# Patient Record
Sex: FEMALE | Race: WHITE | NOT HISPANIC OR LATINO | Employment: OTHER | URBAN - METROPOLITAN AREA
[De-identification: names, ages, dates, MRNs, and addresses within clinical notes are randomized per-mention and may not be internally consistent; named-entity substitution may affect disease eponyms.]

---

## 2019-11-15 ENCOUNTER — HOSPITAL ENCOUNTER (OUTPATIENT)
Dept: ULTRASOUND IMAGING | Facility: HOSPITAL | Age: 59
Discharge: HOME | End: 2019-11-15
Attending: GENERAL PRACTICE

## 2020-05-05 ENCOUNTER — HOSPITAL ENCOUNTER (OUTPATIENT)
Dept: GENERAL RADIOLOGY | Facility: HOSPITAL | Age: 60
Discharge: HOME | End: 2020-05-05
Attending: GENERAL PRACTICE

## 2020-05-05 LAB
25(OH)D3 SERPL-MCNC: 8.3 NG/ML (ref 30–100)
ALBUMIN SERPL-MCNC: 3.8 G/DL (ref 3.5–5)
ALBUMIN/GLOB SERPL: 1.2 {RATIO} (ref 1.4–2.6)
ALP SERPL-CCNC: 80 U/L (ref 53–141)
ALT SERPL-CCNC: 9 U/L (ref 10–40)
ANION GAP SERPL CALC-SCNC: 21 MMOL/L (ref 8–19)
AST SERPL-CCNC: 13 U/L (ref 15–50)
BASOPHILS # BLD AUTO: 0.11 10*3/UL (ref 0–0.2)
BASOPHILS NFR BLD AUTO: 0.8 % (ref 0–3)
BILIRUB SERPL-MCNC: 0.33 MG/DL (ref 0.2–1.3)
BUN SERPL-MCNC: 8 MG/DL (ref 5–25)
BUN/CREAT SERPL: 10 {RATIO} (ref 6–20)
CALCIUM SERPL-MCNC: 9.2 MG/DL (ref 8.7–10.4)
CHLORIDE SERPL-SCNC: 95 MMOL/L (ref 99–111)
CHOLEST SERPL-MCNC: 244 MG/DL (ref 107–200)
CHOLEST/HDLC SERPL: 8.4 {RATIO} (ref 3–6)
CK SERPL-CCNC: 66 U/L (ref 35–230)
CONV ABS IMM GRAN: 0.13 10*3/UL (ref 0–0.2)
CONV CO2: 25 MMOL/L (ref 22–32)
CONV IMMATURE GRAN: 0.9 % (ref 0–1.8)
CONV TOTAL PROTEIN: 7 G/DL (ref 6.3–8.2)
CREAT UR-MCNC: 0.78 MG/DL (ref 0.5–0.9)
DEPRECATED RDW RBC AUTO: 47.7 FL (ref 36.4–46.3)
EOSINOPHIL # BLD AUTO: 0.47 10*3/UL (ref 0–0.7)
EOSINOPHIL # BLD AUTO: 3.4 % (ref 0–7)
ERYTHROCYTE [DISTWIDTH] IN BLOOD BY AUTOMATED COUNT: 14.9 % (ref 11.7–14.4)
EST. AVERAGE GLUCOSE BLD GHB EST-MCNC: 263 MG/DL
GFR SERPLBLD BASED ON 1.73 SQ M-ARVRAT: >60 ML/MIN/{1.73_M2}
GLOBULIN UR ELPH-MCNC: 3.2 G/DL (ref 2–3.5)
GLUCOSE SERPL-MCNC: 302 MG/DL (ref 65–99)
HBA1C MFR BLD: 10.8 % (ref 3.5–5.7)
HCT VFR BLD AUTO: 45.9 % (ref 37–47)
HDLC SERPL-MCNC: 29 MG/DL (ref 40–60)
HGB BLD-MCNC: 13.9 G/DL (ref 12–16)
LDLC SERPL CALC-MCNC: 165 MG/DL (ref 70–100)
LYMPHOCYTES # BLD AUTO: 1.75 10*3/UL (ref 1–5)
LYMPHOCYTES NFR BLD AUTO: 12.5 % (ref 20–45)
MCH RBC QN AUTO: 26.5 PG (ref 27–31)
MCHC RBC AUTO-ENTMCNC: 30.3 G/DL (ref 33–37)
MCV RBC AUTO: 87.4 FL (ref 81–99)
MONOCYTES # BLD AUTO: 0.54 10*3/UL (ref 0.2–1.2)
MONOCYTES NFR BLD AUTO: 3.9 % (ref 3–10)
NEUTROPHILS # BLD AUTO: 10.97 10*3/UL (ref 2–8)
NEUTROPHILS NFR BLD AUTO: 78.5 % (ref 30–85)
NRBC CBCN: 0 % (ref 0–0.7)
OSMOLALITY SERPL CALC.SUM OF ELEC: 292 MOSM/KG (ref 273–304)
PLATELET # BLD AUTO: 358 10*3/UL (ref 130–400)
PMV BLD AUTO: 10.9 FL (ref 9.4–12.3)
POTASSIUM SERPL-SCNC: 5.1 MMOL/L (ref 3.5–5.3)
RBC # BLD AUTO: 5.25 10*6/UL (ref 4.2–5.4)
SODIUM SERPL-SCNC: 136 MMOL/L (ref 135–147)
T4 FREE SERPL-MCNC: 0.5 NG/DL (ref 0.9–1.8)
TRIGL SERPL-MCNC: 250 MG/DL (ref 40–150)
TSH SERPL-ACNC: 21.69 M[IU]/L (ref 0.27–4.2)
VIT B12 SERPL-MCNC: 395 PG/ML (ref 211–911)
VLDLC SERPL-MCNC: 50 MG/DL (ref 5–37)
WBC # BLD AUTO: 13.97 10*3/UL (ref 4.8–10.8)

## 2020-05-06 LAB
CONV CREATININE URINE, RANDOM: 138.5 MG/DL (ref 10–300)
CONV MICROALBUM.,U,RANDOM: 15.3 MG/L (ref 0–20)
MICROALBUMIN/CREAT UR: 11 MG/G{CRE} (ref 0–35)

## 2021-05-03 ENCOUNTER — OFFICE VISIT CONVERTED (OUTPATIENT)
Dept: FAMILY MEDICINE CLINIC | Facility: CLINIC | Age: 61
End: 2021-05-03
Attending: NURSE PRACTITIONER

## 2021-05-03 ENCOUNTER — CONVERSION ENCOUNTER (OUTPATIENT)
Dept: FAMILY MEDICINE CLINIC | Facility: CLINIC | Age: 61
End: 2021-05-03

## 2021-05-03 ENCOUNTER — HOSPITAL ENCOUNTER (OUTPATIENT)
Dept: GENERAL RADIOLOGY | Facility: HOSPITAL | Age: 61
Discharge: HOME | End: 2021-05-03
Attending: NURSE PRACTITIONER

## 2021-05-03 LAB
ALBUMIN SERPL-MCNC: 4.2 G/DL (ref 3.5–5)
ALBUMIN/GLOB SERPL: 1.3 {RATIO} (ref 1.4–2.6)
ALP SERPL-CCNC: 73 U/L (ref 53–141)
ALT SERPL-CCNC: 11 U/L (ref 10–40)
ANION GAP SERPL CALC-SCNC: 20 MMOL/L (ref 8–19)
AST SERPL-CCNC: 11 U/L (ref 15–50)
BASOPHILS # BLD AUTO: 0.09 10*3/UL (ref 0–0.2)
BASOPHILS NFR BLD AUTO: 0.7 % (ref 0–3)
BILIRUB SERPL-MCNC: 0.43 MG/DL (ref 0.2–1.3)
BUN SERPL-MCNC: 12 MG/DL (ref 5–25)
BUN/CREAT SERPL: 13 {RATIO} (ref 6–20)
CALCIUM SERPL-MCNC: 8.6 MG/DL (ref 8.7–10.4)
CHLORIDE SERPL-SCNC: 96 MMOL/L (ref 99–111)
CHOLEST SERPL-MCNC: 253 MG/DL (ref 107–200)
CHOLEST/HDLC SERPL: 6.2 {RATIO} (ref 3–6)
CONV ABS IMM GRAN: 0.11 10*3/UL (ref 0–0.2)
CONV CO2: 26 MMOL/L (ref 22–32)
CONV CREATININE URINE, RANDOM: 57.5 MG/DL (ref 10–300)
CONV IMMATURE GRAN: 0.9 % (ref 0–1.8)
CONV MICROALBUM.,U,RANDOM: <12 MG/L (ref 0–20)
CONV TOTAL PROTEIN: 7.4 G/DL (ref 6.3–8.2)
CREAT UR-MCNC: 0.9 MG/DL (ref 0.5–0.9)
DEPRECATED RDW RBC AUTO: 46.3 FL (ref 36.4–46.3)
EOSINOPHIL # BLD AUTO: 0.37 10*3/UL (ref 0–0.7)
EOSINOPHIL # BLD AUTO: 2.9 % (ref 0–7)
ERYTHROCYTE [DISTWIDTH] IN BLOOD BY AUTOMATED COUNT: 14.4 % (ref 11.7–14.4)
EST. AVERAGE GLUCOSE BLD GHB EST-MCNC: 249 MG/DL
FOLATE SERPL-MCNC: 11 NG/ML (ref 4.8–20)
GFR SERPLBLD BASED ON 1.73 SQ M-ARVRAT: >60 ML/MIN/{1.73_M2}
GLOBULIN UR ELPH-MCNC: 3.2 G/DL (ref 2–3.5)
GLUCOSE SERPL-MCNC: 320 MG/DL (ref 65–99)
HBA1C MFR BLD: 10.3 % (ref 3.5–5.7)
HCT VFR BLD AUTO: 47.8 % (ref 37–47)
HDLC SERPL-MCNC: 41 MG/DL (ref 40–60)
HGB BLD-MCNC: 15.3 G/DL (ref 12–16)
IRON SATN MFR SERPL: 18 % (ref 20–55)
IRON SERPL-MCNC: 67 UG/DL (ref 60–170)
LDLC SERPL CALC-MCNC: 163 MG/DL (ref 70–100)
LYMPHOCYTES # BLD AUTO: 2.13 10*3/UL (ref 1–5)
LYMPHOCYTES NFR BLD AUTO: 17 % (ref 20–45)
MCH RBC QN AUTO: 28.2 PG (ref 27–31)
MCHC RBC AUTO-ENTMCNC: 32 G/DL (ref 33–37)
MCV RBC AUTO: 88.2 FL (ref 81–99)
MICROALBUMIN/CREAT UR: 20.9 MG/G{CRE} (ref 0–35)
MONOCYTES # BLD AUTO: 0.49 10*3/UL (ref 0.2–1.2)
MONOCYTES NFR BLD AUTO: 3.9 % (ref 3–10)
NEUTROPHILS # BLD AUTO: 9.37 10*3/UL (ref 2–8)
NEUTROPHILS NFR BLD AUTO: 74.6 % (ref 30–85)
NRBC CBCN: 0 % (ref 0–0.7)
OSMOLALITY SERPL CALC.SUM OF ELEC: 296 MOSM/KG (ref 273–304)
PLATELET # BLD AUTO: 313 10*3/UL (ref 130–400)
PMV BLD AUTO: 10.6 FL (ref 9.4–12.3)
POTASSIUM SERPL-SCNC: 4.7 MMOL/L (ref 3.5–5.3)
RBC # BLD AUTO: 5.42 10*6/UL (ref 4.2–5.4)
SODIUM SERPL-SCNC: 137 MMOL/L (ref 135–147)
T4 FREE SERPL-MCNC: 0.5 NG/DL (ref 0.9–1.8)
TIBC SERPL-MCNC: 369 UG/DL (ref 245–450)
TRANSFERRIN SERPL-MCNC: 258 MG/DL (ref 250–380)
TRIGL SERPL-MCNC: 244 MG/DL (ref 40–150)
TSH SERPL-ACNC: 21.03 M[IU]/L (ref 0.27–4.2)
VIT B12 SERPL-MCNC: 332 PG/ML (ref 211–911)
VLDLC SERPL-MCNC: 49 MG/DL (ref 5–37)
WBC # BLD AUTO: 12.56 10*3/UL (ref 4.8–10.8)

## 2021-05-06 LAB
CONV CELIAC DISEASE AB-IGA: 263 MG/DL (ref 68–408)
TTG IGA SER-ACNC: <2 U/ML (ref 0–3)

## 2021-05-11 NOTE — H&P
History and Physical      Patient Name: Ifrah Robertson   Patient ID: [id number]   Sex: Female   YOB: 1960    Primary Care Provider: Shari Lara    Visit Date: May 3, 2021    Provider: RM Brunner   Location: [facility]   Location Address: [address] [place] [address]  [id number]   Location Phone: [phone]          Chief Complaint  · Establish care   · discuss diabetes   · discuss covid vaccine       History Of Present Illness  Ifrah Robertson is a 60 year old /White female who presents for evaluation and treatment of:      New patient/est care:    Previous provider: Jasmyn Grant    Pt with T2DM, last A1c between 9-11 according to paitient. States that was approximately 2 years ago. Takes Tresidba and Novolog.     Pt with hypothyroidism, GERD, anxiety/depression, essential htn, lichen sclerosis, hx of CVA, sleep apnea, obesity, allergic rhinitis, hyperlipidemia.     BP elevated today 156/77. Smoker 1.5 ppd x     Pt with arnold chiari malformation,  shunt 3x, CT L hand sx, anal blood clot , R eye laser sx torn retina, R thyroidectomy, C/S .     Lives in [place], , lives alone, 2 children.       Past Medical History  Disease Name Date Onset Notes   Allergies --  --    Arthritis --  --    Broken Bones --  --    Depression --  --    Diabetes --  --    GERD --  --    Gluten intolerance --  --    Head injury --  --    Hemorrhoids --  --    High cholesterol --  --    Hyperthyroidism --  --    Lichen disease --  --    Sleep apnea --  --    Stroke --  --    Uterine cancer --  --          Past Surgical History  Procedure Name Date Notes   Anal biopsy --  anal blood clot    Brain Surgery --  may 2001 and Aug 2001 Arnold Chiari Decompression , VPS shunts    Breast biopsy, left breast --  --    carpal tunnel --  --    Carpal Tunnel Release --  --     delivery only --  --    D and C --  --    EYE SURGERY --  --    Hand  surgery --  --    Incision and drainage abscess --  --    Thyroid surgery --  --          Medication List  Name Date Started Instructions   aspirin 81 mg oral tablet,delayed release (DR/EC)  take 1 tablet (81 mg) by oral route once daily   clobetasol 0.05 % topical ointment  apply a thin layer to the affected area(s) by topical route 2 times per day   escitalopram oxalate 20 mg oral tablet  take 1 tablet (20 mg) by oral route once daily   ezetimibe 10 mg oral tablet  take 1 tablet (10 mg) by oral route once daily   FeroSul 325 mg (65 mg iron) oral tablet  take 1 tablet (325 mg) by oral route once daily   metoprolol succinate 50 mg oral tablet extended release 24 hr  take 1 tablet (50 mg) by oral route once daily   Novolog Flexpen U-100 Insulin 100 unit/mL (3 mL) subcutaneous insulin pen  inject by subcutaneous route per prescriber's instructions. Insulin dosing requires individualization.   pantoprazole 40 mg oral tablet,delayed release (DR/EC)  take 1 tablet (40 mg) by oral route once daily   Synthroid 200 mcg oral tablet  take 1 tablet (200 mcg) by oral route once daily   Tresiba FlexTouch U-200 200 unit/mL (3 mL) subcutaneous insulin pen  inject by subcutaneous route as per insulin protocol   Tylenol oral  --    Vitamin C 500 mg oral tablet  take 1 tablet by oral route once         Allergy List  Allergen Name Date Reaction Notes   cefaclor --  --  --    hydrocodone-acetaminophen --  --  --    Indocin --  --  --    Latex Exam Gloves --  --  --    Toradol --  --  --    Valtrex --  --  --          Family Medical History  Disease Name Relative/Age Notes   Family history of breast cancer Mother/40s   Mother/40s; Aunt/60s   Family history of breast cancer  --    Family history of heart disease  --    Family history of diabetes mellitus  --          Social History  Finding Status Start/Stop Quantity Notes   Alcohol Never --/-- --  drinks no   Caffeine Current every day --/-- --  drinks regularly; coffee, tea and soft  "drinks; 3-4 times per day   Tobacco Current every day 16/-- 1/2 --          Review of Systems  · Constitutional  o Denies  o : fever, fatigue, weight loss, weight gain  · HENT  o Denies  o : headaches, nasal congestion, sore throat  · Cardiovascular  o Denies  o : lower extremity edema, claudication, chest pressure, palpitations  · Respiratory  o Denies  o : shortness of breath, wheezing, cough, hemoptysis, dyspnea on exertion  · Gastrointestinal  o Denies  o : nausea, vomiting, diarrhea, constipation, abdominal pain  · Musculoskeletal  o Denies  o : muscle pain, back pain  · Psychiatric  o Denies  o : anxiety, depression, suicidal ideation, homicidal ideation      Vitals  Date Time BP Position Site L\R Cuff Size HR RR TEMP (F) WT  HT  BMI kg/m2 BSA m2 O2 Sat FR L/min FiO2 HC       05/03/2021 02:03 /77 Sitting    71 - R 18 97.8 329lbs 7oz 5'  8.5\" 49.36 2.69 97 %  21%          Physical Examination  · Constitutional  o Appearance  o : no acute distress, well-nourished  · Head and Face  o Head  o :   § Inspection  § : atraumatic, normocephalic  · Ears, Nose, Mouth and Throat  o Ears  o :   § External Ears  § : normal  o Nose  o :   § Intranasal Exam  § : nares patent  o Oral Cavity  o :   § Oral Mucosa  § : moist mucous membranes  · Neck  o Thyroid  o : gland size normal, nontender, no nodules or masses present on palpation, thyroid motion normal during swallowing  · Respiratory  o Respiratory Effort  o : breathing comfortably, symmetric chest rise  o Auscultation of Lungs  o : clear to asculatation bilaterally, no wheezes, rales, or rhonchii  · Cardiovascular  o Heart  o :   § Auscultation of Heart  § : regular rate and rhythm, no murmurs, rubs, or gallops  o Peripheral Vascular System  o :   § Extremities  § : no edema  · Lymphatic  o Neck  o : no lymphadenopathy present, normal size  o Supraclavicular Nodes  o : no supraclavicular nodes  · Skin and Subcutaneous Tissue  o General Inspection  o : no lesions " present, no areas of discoloration, skin turgor normal, texture normal  · Neurologic  o Mental Status Examination  o :   § Orientation  § : grossly oriented to person, place and time  o Gait and Station  o :   § Gait Screening  § : normal gait  · Psychiatric  o General  o : normal mood and affect              Assessment  · Anemia     285.9/D64.9  · Anxiety disorder     300.00/F41.9  · Depression     311/F32.9  · Diabetes mellitus, type 2     250.00/E11.9  · Essential hypertension     401.9/I10  · GERD (gastroesophageal reflux disease)     530.81/K21.9  · Hyperlipidemia     272.4/E78.5  · Hypothyroidism     244.9/E03.9  · Nicotine dependence     305.1/F17.200  · Obesity     278.00/E66.9  · Screening for depression     V79.0/Z13.89  · Establishing care with new doctor, encounter for     V65.8/Z76.89  · Sleep apnea     780.57/G47.30  · Gluten intolerance     579.0/K90.41       New patient/est care:  noncompliant; has been out of meds x 2 weeks     T2DM:  Likely uncontrolled  A1c and labs today  Advised she must follow T2DM diet  Referral to Mahi Barron  Refilled tresiba and Humalog  Strongly rec weight loss     Hyperlipidemia:  Refilled zetia  Lipid panel     Hypertension:  elevated   Refilled metoprolol 50mg PO qd  Low NA diet  check BP reg at home, keep log, bring to next appt  FU 3 months  Strongly suggest smoking cessation    Hypothyroidism:  Refilled levothyroxine   thyroid profile     anxiety/depression:  Continue Lexapro 10mg PO qd                       Plan  · Orders  o B12 Folate levels (B12FO) - 285.9/D64.9 - 05/03/2021  o Iron panel (iron, TIBC, transferrin saturation) (74825, 04516, 25388) - 285.9/D64.9 - 05/03/2021  o Diabetes 2 Panel (Urine Microalbumin, CMP, Lipid, A1c, ) [facility] (64400, 38389, 99616, 14834) - 250.00/E11.9 - 05/03/2021  o CBC with Auto Diff [facility] (37048) - 250.00/E11.9 - 05/03/2021  o Lipid Panel [facility] (33734) - 272.4/E78.5 - 05/03/2021  o Thyroid Profile (18338, 35102, THYII) - 244.9/E03.9 -  05/03/2021  o ACO-17: Screened for tobacco use AND received tobacco cessation intervention (4004F) - 305.1/F17.200 - 05/03/2021  o ACO-18: Negative screen for clinical depression using a standardized tool () - V79.0/Z13.89 - 05/03/2021  o ACO-15: Pneumococcal Vaccine Administered or Previously Received [facility] (4040F) - - 05/03/2021  o ACO-14: Influenza immunization was not administered for reasons documented [facility] () - - 05/03/2021  o ACO-39: Current medications updated and reviewed (, 1159F) - - 05/03/2021  o ENDOCRINOLOGY CONSULTATION (ENDOC) - 250.00/E11.9 - 05/03/2021   Mahi Barron  o Celiac disease antibody panel (86128) - 579.0/K90.41 - 05/03/2021  · Medications  o Blood Glucose Monitoring miscellaneous kit   SIG: use as directed   DISP: (1) Kit with 0 refills  Prescribed on 05/03/2021     o Lancets,Thin 23 gauge miscellaneous misc   SIG: use as directed   DISP: (1) Box with 3 refills  Prescribed on 05/03/2021     o Accu-Chek Guide test strips miscellaneous strip   SIG: Check blood glucose fasting, before meals   DISP: (1) Box with 3 refills  Prescribed on 05/03/2021     o Synthroid 200 mcg oral tablet   SIG: take 1 tablet (200 mcg) by oral route once daily for 30 days   DISP: (30) Tablet with 5 refills  Prescribed on 05/03/2021     o aspirin 81 mg oral tablet,delayed release (DR/EC)   SIG: take 1 tablet (81 mg) by oral route once daily   DISP: (30) Tablet with 5 refills  Refilled on 05/03/2021     o clobetasol 0.05 % topical ointment   SIG: apply a thin layer to the affected area(s) by topical route 2 times per day   DISP: (1) Tube with 3 refills  Refilled on 05/03/2021     o escitalopram oxalate 20 mg oral tablet   SIG: take 1 tablet (20 mg) by oral route once daily   DISP: (30) Tablet with 5 refills  Refilled on 05/03/2021     o ezetimibe 10 mg oral tablet   SIG: take 1 tablet (10 mg) by oral route once daily   DISP: (30) Tablet with 5 refills  Refilled on 05/03/2021     o FeroSul 325 mg (65 mg  "iron) oral tablet   SIG: take 1 tablet (325 mg) by oral route once daily   DISP: (30) Tablet with 5 refills  Refilled on 05/03/2021     o metoprolol succinate 50 mg oral tablet extended release 24 hr   SIG: take 1 tablet (50 mg) by oral route once daily   DISP: (30) Tablet with 5 refills  Refilled on 05/03/2021     o Novolog Flexpen U-100 Insulin 100 unit/mL (3 mL) subcutaneous insulin pen   SIG: inject by subcutaneous route per prescriber's instructions. Insulin dosing requires individualization.   DISP: (1) Pen Needle with 5 refills  Refilled on 05/03/2021     o pantoprazole 40 mg oral tablet,delayed release (DR/EC)   SIG: take 1 tablet (40 mg) by oral route once daily   DISP: (30) Tablet with 5 refills  Refilled on 05/03/2021     o Tresiba FlexTouch U-200 200 unit/mL (3 mL) subcutaneous insulin pen   SIG: inject by subcutaneous route as per insulin protocol   DISP: (1) Pen Needle with 5 refills  Refilled on 05/03/2021     o Vitamin C 500 mg oral tablet   SIG: take 1 tablet by oral route once   DISP: (30) Tablet with 5 refills  Refilled on 05/03/2021     o Medications have been Reconciled  o Transition of Care or Provider Policy  · Instructions  o Discussed the need for therapy, either with a certified counselor, psychologist, and/or family . If no improvement is noted or worsening of their condition, return to office or ER. But also discussed with patient that if they are non-responsive to the type of medication they may need to see a psychiatrist for further evaluation and management.  o Patient was given an SSRI/SSNRI medication and warned of possible side effects of the medication including potential for increased risk of suicidal thoughts and feelings. Patient was instructed that if they begin to exhibit any of these effects they will discontinue the medication immediately and contact our office or the ER ASAP.  o Patient agrees to a \"No Self Harm\" contract. Patient will either call us, 911, ER, " "[facility], [facility].  o Discussed the need for therapy, either with a certified counselor, psychologist, and/or family . If no improvement is noted or worsening of their condition, return to office or ER. But also discussed with patient that if they are non-responsive to the type of medication they may need to see a psychiatrist for further evaluation and management.  o Patient was given an SSRI/SSNRI medication and warned of possible side effects of the medication including potential for increased risk of suicidal thoughts and feelings. Patient was instructed that if they begin to exhibit any of these effects they will discontinue the medication immediately and contact our office or the ER ASAP.  o Patient agrees to a \"No Self Harm\" contract. Patient will either call us, 911, ER, [facility], [facility].  o Continue blood sugar monitoring daily and record. Bring your log to office visits. Call the office for readings below 70 and above 250 or any complications.  o Daily foot care. Avoid walking barefoot. Annual Dilated Eye Exam.  o Discussed with patient blood pressure monitoring, hemoglobin A1C levels need to be below 7.0, and LDL (Lipid) goals below 70.  o Patient advised to monitor blood pressure (B/P) at home and journal readings. Patient informed that a B/P reading at home of more than 130/80 is considered hypertension. For readings greater bevm453/90 or higher patient is advised to follow up in the office with readings for management. Patient advised to limit sodium intake.  o Advised that cheeses and other sources of dairy fats, animal fats, fast food, and the extras (candy, pastries, pies, doughnuts and cookies) all contain LDL raising nutrients. Advised to increase fruits, vegetables, whole grains, and to monitor portion sizes.   o *Form of nicotine being used: cigarettes   o Patient was strongly encouraged to discontinue use of any " nicotine containing product or minimize the use of the product.  o Depression Screen completed and scanned into the EMR under the designated folder within the patient's documents.  o Today's PHQ-9 result is _4__  o Take all medications as prescribed/directed.  o Patient was educated/instructed on their diagnosis, treatment and medications prior to discharge from the clinic today.  o Patient counseled to stop smoking.  o Patient counseled to reduce calorie intake.  o Patient was instructed to exercise regularly.  o Patient instructed to seek medical attention urgently for new or worsening symptoms.  o Call the office with any concerns or questions.  o Bring all medicines with their bottles to each office visit.  o Risks, benefits, and alternatives were discussed with the patient. The patient is aware of risks associated with: all meds and conditions.   o Chronic conditions reviewed and taken into consideration for today's treatment plan.  o Discussed Covid-19 precautions including, but not limited to, social distancing, avoid touching your face, and hand washing.   · Disposition  o Call or Return if symptoms worsen or persist.  o Follow Up PRN.  o Follow Up in 3 months.  o Proceed to ED for all medical emergencies.            Electronically Signed by: RM Brunner -Author on May 3, 2021 03:21:48 PM

## 2021-05-13 ENCOUNTER — HOSPITAL ENCOUNTER (OUTPATIENT)
Dept: VACCINE CLINIC | Facility: HOSPITAL | Age: 61
Discharge: HOME | End: 2021-05-13
Attending: INTERNAL MEDICINE

## 2021-05-14 VITALS
WEIGHT: 293 LBS | TEMPERATURE: 97.8 F | HEART RATE: 71 BPM | DIASTOLIC BLOOD PRESSURE: 77 MMHG | HEIGHT: 68 IN | OXYGEN SATURATION: 97 % | SYSTOLIC BLOOD PRESSURE: 156 MMHG | BODY MASS INDEX: 44.41 KG/M2 | RESPIRATION RATE: 18 BRPM

## 2021-10-05 RX ORDER — ESCITALOPRAM OXALATE 20 MG/1
TABLET ORAL
Qty: 60 TABLET | Refills: 5 | Status: SHIPPED | OUTPATIENT
Start: 2021-10-05 | End: 2021-11-01 | Stop reason: SDUPTHER

## 2021-10-18 ENCOUNTER — LAB (OUTPATIENT)
Dept: LAB | Facility: HOSPITAL | Age: 61
End: 2021-10-18

## 2021-10-18 ENCOUNTER — OFFICE VISIT (OUTPATIENT)
Dept: FAMILY MEDICINE CLINIC | Facility: CLINIC | Age: 61
End: 2021-10-18

## 2021-10-18 VITALS
BODY MASS INDEX: 44.41 KG/M2 | TEMPERATURE: 97.8 F | HEART RATE: 83 BPM | HEIGHT: 68 IN | OXYGEN SATURATION: 97 % | SYSTOLIC BLOOD PRESSURE: 152 MMHG | WEIGHT: 293 LBS | DIASTOLIC BLOOD PRESSURE: 76 MMHG

## 2021-10-18 DIAGNOSIS — Z79.4 TYPE 2 DIABETES MELLITUS WITH HYPERGLYCEMIA, WITH LONG-TERM CURRENT USE OF INSULIN (HCC): Chronic | ICD-10-CM

## 2021-10-18 DIAGNOSIS — E11.69 DYSLIPIDEMIA WITH LOW HIGH DENSITY LIPOPROTEIN (HDL) CHOLESTEROL WITH HYPERTRIGLYCERIDEMIA DUE TO TYPE 2 DIABETES MELLITUS (HCC): Chronic | ICD-10-CM

## 2021-10-18 DIAGNOSIS — D50.8 IRON DEFICIENCY ANEMIA SECONDARY TO INADEQUATE DIETARY IRON INTAKE: ICD-10-CM

## 2021-10-18 DIAGNOSIS — E11.65 TYPE 2 DIABETES MELLITUS WITH HYPERGLYCEMIA, WITH LONG-TERM CURRENT USE OF INSULIN (HCC): ICD-10-CM

## 2021-10-18 DIAGNOSIS — E78.00 HIGH CHOLESTEROL: ICD-10-CM

## 2021-10-18 DIAGNOSIS — D72.823 LEUKEMOID REACTION: Chronic | ICD-10-CM

## 2021-10-18 DIAGNOSIS — E55.9 VITAMIN D DEFICIENCY DISEASE: Chronic | ICD-10-CM

## 2021-10-18 DIAGNOSIS — F17.219 CIGARETTE NICOTINE DEPENDENCE WITH NICOTINE-INDUCED DISORDER: ICD-10-CM

## 2021-10-18 DIAGNOSIS — E11.65 TYPE 2 DIABETES MELLITUS WITH HYPERGLYCEMIA, WITH LONG-TERM CURRENT USE OF INSULIN (HCC): Chronic | ICD-10-CM

## 2021-10-18 DIAGNOSIS — E78.2 DYSLIPIDEMIA WITH LOW HIGH DENSITY LIPOPROTEIN (HDL) CHOLESTEROL WITH HYPERTRIGLYCERIDEMIA DUE TO TYPE 2 DIABETES MELLITUS (HCC): Chronic | ICD-10-CM

## 2021-10-18 DIAGNOSIS — F51.01 PRIMARY INSOMNIA: ICD-10-CM

## 2021-10-18 DIAGNOSIS — E66.01 MORBID OBESITY WITH BODY MASS INDEX (BMI) OF 50.0 TO 59.9 IN ADULT (HCC): Chronic | ICD-10-CM

## 2021-10-18 DIAGNOSIS — Z79.4 TYPE 2 DIABETES MELLITUS WITH HYPERGLYCEMIA, WITH LONG-TERM CURRENT USE OF INSULIN (HCC): ICD-10-CM

## 2021-10-18 DIAGNOSIS — E03.9 HYPOTHYROIDISM, UNSPECIFIED TYPE: ICD-10-CM

## 2021-10-18 DIAGNOSIS — E03.9 HYPOTHYROIDISM, UNSPECIFIED TYPE: Primary | Chronic | ICD-10-CM

## 2021-10-18 PROBLEM — E11.9 DIABETES: Status: ACTIVE | Noted: 2021-10-18

## 2021-10-18 PROBLEM — K21.9 ESOPHAGEAL REFLUX: Status: ACTIVE | Noted: 2021-10-18

## 2021-10-18 PROBLEM — E66.813 CLASS 3 SEVERE OBESITY WITH BODY MASS INDEX (BMI) OF 45.0 TO 49.9 IN ADULT: Status: ACTIVE | Noted: 2020-05-21

## 2021-10-18 LAB
ALBUMIN SERPL-MCNC: 4.1 G/DL (ref 3.5–5.2)
ALBUMIN/GLOB SERPL: 1.4 G/DL
ALP SERPL-CCNC: 72 U/L (ref 39–117)
ALT SERPL W P-5'-P-CCNC: 11 U/L (ref 1–33)
ANION GAP SERPL CALCULATED.3IONS-SCNC: 11.2 MMOL/L (ref 5–15)
AST SERPL-CCNC: 14 U/L (ref 1–32)
BILIRUB SERPL-MCNC: 0.3 MG/DL (ref 0–1.2)
BUN SERPL-MCNC: 7 MG/DL (ref 8–23)
BUN/CREAT SERPL: 9.7 (ref 7–25)
CALCIUM SPEC-SCNC: 8.7 MG/DL (ref 8.6–10.5)
CHLORIDE SERPL-SCNC: 97 MMOL/L (ref 98–107)
CHOLEST SERPL-MCNC: 263 MG/DL (ref 0–200)
CO2 SERPL-SCNC: 28.8 MMOL/L (ref 22–29)
CREAT SERPL-MCNC: 0.72 MG/DL (ref 0.57–1)
DEPRECATED RDW RBC AUTO: 44 FL (ref 37–54)
ERYTHROCYTE [DISTWIDTH] IN BLOOD BY AUTOMATED COUNT: 13.3 % (ref 12.3–15.4)
GFR SERPL CREATININE-BSD FRML MDRD: 83 ML/MIN/1.73
GLOBULIN UR ELPH-MCNC: 2.9 GM/DL
GLUCOSE SERPL-MCNC: 245 MG/DL (ref 65–99)
HBA1C MFR BLD: 11.13 % (ref 4.8–5.6)
HCT VFR BLD AUTO: 45.2 % (ref 34–46.6)
HDLC SERPL-MCNC: 38 MG/DL (ref 40–60)
HGB BLD-MCNC: 14.1 G/DL (ref 12–15.9)
IRON 24H UR-MRATE: 53 MCG/DL (ref 37–145)
IRON SATN MFR SERPL: 13 % (ref 20–50)
LDLC SERPL CALC-MCNC: 176 MG/DL (ref 0–100)
LDLC/HDLC SERPL: 4.58 {RATIO}
MCH RBC QN AUTO: 28.1 PG (ref 26.6–33)
MCHC RBC AUTO-ENTMCNC: 31.2 G/DL (ref 31.5–35.7)
MCV RBC AUTO: 90.2 FL (ref 79–97)
PLATELET # BLD AUTO: 345 10*3/MM3 (ref 140–450)
PMV BLD AUTO: 10.8 FL (ref 6–12)
POTASSIUM SERPL-SCNC: 4.9 MMOL/L (ref 3.5–5.2)
PROT SERPL-MCNC: 7 G/DL (ref 6–8.5)
RBC # BLD AUTO: 5.01 10*6/MM3 (ref 3.77–5.28)
SODIUM SERPL-SCNC: 137 MMOL/L (ref 136–145)
T4 FREE SERPL-MCNC: 1.12 NG/DL (ref 0.93–1.7)
TIBC SERPL-MCNC: 393 MCG/DL (ref 298–536)
TRANSFERRIN SERPL-MCNC: 264 MG/DL (ref 200–360)
TRIGL SERPL-MCNC: 254 MG/DL (ref 0–150)
TSH SERPL DL<=0.05 MIU/L-ACNC: 7.81 UIU/ML (ref 0.27–4.2)
VLDLC SERPL-MCNC: 49 MG/DL (ref 5–40)
WBC # BLD AUTO: 12.54 10*3/MM3 (ref 3.4–10.8)

## 2021-10-18 PROCEDURE — 80061 LIPID PANEL: CPT

## 2021-10-18 PROCEDURE — 84466 ASSAY OF TRANSFERRIN: CPT

## 2021-10-18 PROCEDURE — 99214 OFFICE O/P EST MOD 30 MIN: CPT | Performed by: FAMILY MEDICINE

## 2021-10-18 PROCEDURE — 84439 ASSAY OF FREE THYROXINE: CPT

## 2021-10-18 PROCEDURE — 36415 COLL VENOUS BLD VENIPUNCTURE: CPT

## 2021-10-18 PROCEDURE — 83036 HEMOGLOBIN GLYCOSYLATED A1C: CPT

## 2021-10-18 PROCEDURE — 85027 COMPLETE CBC AUTOMATED: CPT

## 2021-10-18 PROCEDURE — 82746 ASSAY OF FOLIC ACID SERUM: CPT

## 2021-10-18 PROCEDURE — 82607 VITAMIN B-12: CPT

## 2021-10-18 PROCEDURE — 83540 ASSAY OF IRON: CPT

## 2021-10-18 PROCEDURE — 84443 ASSAY THYROID STIM HORMONE: CPT

## 2021-10-18 PROCEDURE — 80053 COMPREHEN METABOLIC PANEL: CPT

## 2021-10-18 RX ORDER — TRAZODONE HYDROCHLORIDE 50 MG/1
50 TABLET ORAL NIGHTLY
Qty: 30 TABLET | Refills: 2 | Status: SHIPPED | OUTPATIENT
Start: 2021-10-18 | End: 2021-11-01

## 2021-10-18 RX ORDER — METOPROLOL SUCCINATE 50 MG/1
TABLET, EXTENDED RELEASE ORAL
COMMUNITY
Start: 2021-10-05 | End: 2021-11-01 | Stop reason: SDUPTHER

## 2021-10-18 RX ORDER — PEN NEEDLE, DIABETIC 30 GX3/16"
NEEDLE, DISPOSABLE MISCELLANEOUS
Qty: 200 EACH | Refills: 11 | Status: SHIPPED | OUTPATIENT
Start: 2021-10-18

## 2021-10-18 RX ORDER — INSULIN DEGLUDEC 200 U/ML
INJECTION, SOLUTION SUBCUTANEOUS
COMMUNITY
Start: 2021-10-06 | End: 2021-11-01 | Stop reason: SDUPTHER

## 2021-10-18 RX ORDER — DULAGLUTIDE 0.75 MG/.5ML
1 INJECTION, SOLUTION SUBCUTANEOUS DAILY
Qty: 2 PEN | Refills: 0 | COMMUNITY
Start: 2021-10-18 | End: 2021-11-29 | Stop reason: SDUPTHER

## 2021-10-18 RX ORDER — PANTOPRAZOLE SODIUM 40 MG/1
TABLET, DELAYED RELEASE ORAL
COMMUNITY
Start: 2021-10-05 | End: 2021-11-01 | Stop reason: SDUPTHER

## 2021-10-18 RX ORDER — EZETIMIBE 10 MG/1
TABLET ORAL
COMMUNITY
Start: 2021-10-05 | End: 2022-05-02

## 2021-10-18 RX ORDER — LEVOTHYROXINE SODIUM 0.2 MG/1
200 TABLET ORAL DAILY
COMMUNITY
End: 2021-11-01 | Stop reason: SDUPTHER

## 2021-10-18 NOTE — PROGRESS NOTES
"Chief Complaint  Follow-up (diabetes)    Subjective          Ifrah Robertson presents to [facility]  History of Present Illness    Patient presents from diabetes and addressing her weight loss as well as other chronic complaints.  She takes Tresiba also mealtime insulin and is tired of taking insulin because does not seem to be doing anything, her A1c has been over 10 despite how much insulin she takes or how little she eat.     She has history of hyperlipidemia and intolerant of statins in the past, chronic smoker undressing quitting today denies any COPD or worsening shortness of breath    She also complains of insomnia she takes melatonin but is not helping, has not been on trazodone for and agreeable to try.    She complains of being tired all the time is probably complicated by her chronic illnesses and obesity with BMI over 50 not to mention hypothyroidism requiring 200 mcg of and sleep apnea      Last labs 5/2021   celiac panel was negative  TSH 21, T4 was 0.5, taking synthroid 200mcg daily  iron levels low at 67, iron sat 18 taking iron supplement   B12 low normal, Ca 8.6   *Vitamin D checked last 5/2020 was 8.3  Glucose was over 300, triglycerides 244 total cholesterol 253 , unchanged from previous intolerance to statins severe myalgias  (has had CK checked in 2020 w/in normal limits unsure if checked while on statin or after coming off)  A1c was 10.3 and urine microalbumin was less than 30  WBC are persistently elevated, 13.97 last year and 12.22 the year prior       Objective   Vital Signs:   /76 (BP Location: Right arm, Patient Position: Sitting, Cuff Size: Adult)   Pulse 83   Temp 97.8 °F (36.6 °C) (Temporal)   Ht 172.7 cm (68\")   Wt (!) 149 kg (329 lb)   SpO2 97%   BMI 50.02 kg/m²     Physical Exam  Vitals reviewed.   Constitutional:       Appearance: Normal appearance. She is well-developed.   HENT:      Head: Normocephalic and atraumatic.      " Right Ear: External ear normal.      Left Ear: External ear normal.      Mouth/Throat:      Pharynx: No oropharyngeal exudate.   Eyes:      Conjunctiva/sclera: Conjunctivae normal.      Pupils: Pupils are equal, round, and reactive to light.   Cardiovascular:      Rate and Rhythm: Normal rate and regular rhythm.      Heart sounds: No murmur heard.  No friction rub. No gallop.    Pulmonary:      Effort: Pulmonary effort is normal.      Breath sounds: Normal breath sounds. No wheezing or rhonchi.   Abdominal:      General: Bowel sounds are normal. There is no distension.      Palpations: Abdomen is soft.      Tenderness: There is no abdominal tenderness.   Skin:     General: Skin is warm and dry.   Neurological:      Mental Status: She is alert and oriented to person, place, and time.      Cranial Nerves: No cranial nerve deficit.   Psychiatric:         Mood and Affect: Mood and affect normal.         Behavior: Behavior normal.         Thought Content: Thought content normal.         Judgment: Judgment normal.        Result Review :   The following data was reviewed by: Rashid Acosta DO on 10/18/2021:  Common labs    Common Labsle 5/3/21 5/3/21 10/18/21 10/18/21 10/18/21 10/18/21    1834 1834 1611 1611 1611 1611   Glucose     245 (A)    Glucose  320 (A)       BUN  12   7 (A)    Creatinine  0.90   0.72    eGFR Non African Am     83    Sodium  137   137    Potassium  4.7   4.9    Chloride  96 (A)   97 (A)    Calcium  8.6 (A)   8.7    Albumin  4.2   4.10    Total Bilirubin  0.43   0.3    Alkaline Phosphatase  73   72    AST (SGOT)  11 (A)   14    ALT (SGPT)  11   11    WBC 12.56 (A)  12.54 (A)      Hemoglobin 15.3  14.1      Hematocrit 47.8 (A)  45.2      Platelets 313  345      Total Cholesterol      263 (A)   Total Cholesterol  253 (A)       Triglycerides  244 (A)    254 (A)   HDL Cholesterol  41    38 (A)   LDL Cholesterol   163 (A)    176 (A)   Hemoglobin A1C  10.3 (A)  11.13 (A)     (A) Abnormal value       Comments  are available for some flowsheets but are not being displayed.           Lipid Panel    Lipid Panel 5/3/21 10/18/21   Total Cholesterol  263 (A)   Total Cholesterol 253 (A)    Triglycerides 244 (A) 254 (A)   HDL Cholesterol 41 38 (A)   VLDL Cholesterol 49 (A) 49 (A)   LDL Cholesterol  163 (A) 176 (A)   LDL/HDL Ratio  4.58   (A) Abnormal value       Comments are available for some flowsheets but are not being displayed.           TSH    TSH 5/3/21 10/18/21   TSH 21.030 (A) 7.810 (A)   (A) Abnormal value            Most Recent A1C    HGBA1C Most Recent 10/18/21   Hemoglobin A1C 11.13 (A)   (A) Abnormal value                      Assessment and Plan    Diagnoses and all orders for this visit:    1. Hypothyroidism, unspecified type (Primary)  -     Comprehensive Metabolic Panel; Future  -     TSH+Free T4; Future  -     Lipid panel; Future  -     Hemoglobin A1c; Future  -     Microalbumin / Creatinine Urine Ratio - Urine, Clean Catch; Future  -     Comprehensive Metabolic Panel; Future  -     CBC (No Diff); Future  -     TSH+Free T4; Future    2. Iron deficiency anemia secondary to inadequate dietary iron intake  Assessment & Plan:  *managed  Continue iron therapy, reviewed labs from past and Hgb stable but iron low in the past as well as vit D and b12, recheck and consider iron infusion if taking daily without increase in iron or & sat    Orders:  -     Comprehensive Metabolic Panel; Future  -     CBC (No Diff); Future  -     Iron Profile; Future  -     Lipid panel; Future  -     Hemoglobin A1c; Future  -     Microalbumin / Creatinine Urine Ratio - Urine, Clean Catch; Future  -     Comprehensive Metabolic Panel; Future  -     CBC (No Diff); Future  -     CBC w AUTO Differential; Future  -     Iron Profile; Future  -     Ferritin; Future    3. Type 2 diabetes mellitus with hyperglycemia, with long-term current use of insulin (HCC)  Assessment & Plan:  *uncontrolled  a1c goal <7-7.5, repeat today but glucose not  improve  Last a1c >10 on 5/2021  Using insulin long term short and long acting   Glucose fasting >200-300 on average she reports  Continue Tresiba  Hold taking novolog with meals for a week but check sugar, start Trulicity today instructions on how to inject at home 1x weekly and side effects, nausea  Titrate up to 1.5mg in a week or 2, provided samples of 2 injections  Eye exam 6/2021 with macular edema, need to get a1c 7.5 will add jardiance or similar as we hopefully improve to fasting <120-150 and then work on meal time glucose if elevating a1c  Recommend diabetic foot exam      Continue medicines as prescribed  Recheck a1c 3mo after last  Goal fasting sugar <120-150  Up to date on vax  Foot and eye exam recommended annually       Orders:  -     Comprehensive Metabolic Panel; Future  -     Hemoglobin A1c; Future  -     Vitamin B12 & Folate; Future  -     Insulin Pen Needle (Pen Needles) 31G X 8 MM misc; Use to inject insulin up to 5 times daily, T2DM e11.9  Dispense: 200 each; Refill: 11  -     Lipid panel; Future  -     Hemoglobin A1c; Future  -     Microalbumin / Creatinine Urine Ratio - Urine, Clean Catch; Future  -     Comprehensive Metabolic Panel; Future  -     CBC (No Diff); Future  -     Vitamin B12; Future    4. Dyslipidemia with low high density lipoprotein (HDL) cholesterol with hypertriglyceridemia due to type 2 diabetes mellitus (HCC)  Assessment & Plan:  *uncontrolled  Continue meds, diet for now with calorie restrict  Low fat diet, intolerant of statins in the past due to myalgia  Last lipid cholesterol >200 and LDL was 160  Consider repatha and workup for familial hyperlipidemia, recheck lipid ordered  Goal LDL <      Orders:  -     Comprehensive Metabolic Panel; Future  -     Lipid Panel; Future  -     Lipid panel; Future  -     Hemoglobin A1c; Future  -     Microalbumin / Creatinine Urine Ratio - Urine, Clean Catch; Future  -     Comprehensive Metabolic Panel; Future  -     CBC (No Diff);  Future    5. Primary insomnia  Assessment & Plan:  *counseled, no better with melatonin, sleep hygiene  Trazodone trial 50mg at night, titrate up to 2 a few hours before bed over next 1-2 weeks  Counseled on medicines ambien lunesta are more common controlled medicines for sleep with risk, Tucker reviewed, low risk diversion, consider at follow up    Orders:  -     Lipid panel; Future  -     Hemoglobin A1c; Future  -     Microalbumin / Creatinine Urine Ratio - Urine, Clean Catch; Future  -     Comprehensive Metabolic Panel; Future  -     CBC (No Diff); Future    6. Morbid obesity with body mass index (BMI) of 50.0 to 59.9 in adult (HCC)    7. Cigarette nicotine dependence with nicotine-induced disorder  Assessment & Plan:  *chronic tobacco use, >30 pack year history, recommend cessation with therapy, meds or gum patches  LDCT Lung cancer screen in past but not charted, repeat per specialist if last ordered follows with Pulm/Sleep medicine in [place] for HAILEY, may have help with her insomnia additionally      8. Leukemoid reaction  Assessment & Plan:  *chronic, WBC past few CBCs >10, chronic disease and tobacco abuse contriburing       9. Vitamin D deficiency disease  Assessment & Plan:  *last Vitamin D was 8, recommend supplement weekly 50k  (gissell recommend recheck Vit D PTH Ca for def at follow up labs for 2nd hyperparathyroid)        Orders:  -     Vitamin D 25 hydroxy; Future  -     PTH, Intact; Future    Other orders  -     traZODone (DESYREL) 50 MG tablet; Take 1 tablet by mouth Every Night.  Dispense: 30 tablet; Refill: 2  -     Dulaglutide (Trulicity) 0.75 MG/0.5ML solution pen-injector; Inject 0.75 mg under the skin into the appropriate area as directed Daily.  Dispense: 2 pen; Refill: 0      Follow Up   Return in about 2 weeks (around 11/1/2021), or if symptoms worsen or fail to improve, for Recheck.  Patient was given instructions and counseling regarding her condition or for health maintenance  advice. Please see specific information pulled into the AVS if appropriate.

## 2021-10-19 LAB
FOLATE SERPL-MCNC: 7.7 NG/ML (ref 4.78–24.2)
VIT B12 BLD-MCNC: 369 PG/ML (ref 211–946)

## 2021-10-21 ENCOUNTER — TELEPHONE (OUTPATIENT)
Dept: FAMILY MEDICINE CLINIC | Facility: CLINIC | Age: 61
End: 2021-10-21

## 2021-10-21 NOTE — TELEPHONE ENCOUNTER
Caller: Ifrah Robertson    Relationship: Self    Best call back number: [phone]    Caller requesting test results: SELF    What test was performed: LABS    When was the test performed: 10/18/21    Where was the test performed: [facility]

## 2021-10-24 PROBLEM — E11.65 TYPE 2 DIABETES MELLITUS WITH HYPERGLYCEMIA, WITH LONG-TERM CURRENT USE OF INSULIN (HCC): Chronic | Status: ACTIVE | Noted: 2021-10-18

## 2021-10-24 PROBLEM — F32.A DEPRESSION: Status: ACTIVE | Noted: 2021-10-24

## 2021-10-24 PROBLEM — K64.9 HEMORRHOIDS: Status: ACTIVE | Noted: 2021-10-24

## 2021-10-24 PROBLEM — D72.829 LEUKOCYTOSIS: Chronic | Status: ACTIVE | Noted: 2021-10-24

## 2021-10-24 PROBLEM — E78.00 HIGH CHOLESTEROL: Chronic | Status: ACTIVE | Noted: 2021-10-18

## 2021-10-24 PROBLEM — M15.9 GENERALIZED OSTEOARTHRITIS OF MULTIPLE SITES: Status: ACTIVE | Noted: 2021-10-24

## 2021-10-24 PROBLEM — E66.01 MORBID OBESITY WITH BODY MASS INDEX (BMI) OF 50.0 TO 59.9 IN ADULT: Chronic | Status: ACTIVE | Noted: 2020-05-21

## 2021-10-24 PROBLEM — I63.9 STROKE: Status: ACTIVE | Noted: 2021-10-24

## 2021-10-24 PROBLEM — E83.51 HYPOCALCEMIA: Status: ACTIVE | Noted: 2021-10-24

## 2021-10-24 PROBLEM — Z79.4 TYPE 2 DIABETES MELLITUS WITH HYPERGLYCEMIA, WITH LONG-TERM CURRENT USE OF INSULIN: Chronic | Status: ACTIVE | Noted: 2021-10-18

## 2021-10-24 PROBLEM — D50.8 IRON DEFICIENCY ANEMIA SECONDARY TO INADEQUATE DIETARY IRON INTAKE: Chronic | Status: ACTIVE | Noted: 2021-10-18

## 2021-10-24 PROBLEM — E55.9 VITAMIN D DEFICIENCY DISEASE: Chronic | Status: ACTIVE | Noted: 2021-10-24

## 2021-10-24 PROBLEM — G47.33 OSA (OBSTRUCTIVE SLEEP APNEA): Status: ACTIVE | Noted: 2020-05-21

## 2021-10-25 NOTE — ASSESSMENT & PLAN NOTE
*managed  Continue iron therapy, reviewed labs from past and Hgb stable but iron low in the past as well as vit D and b12, recheck and consider iron infusion if taking daily without increase in iron or & sat

## 2021-10-25 NOTE — ASSESSMENT & PLAN NOTE
*last Vitamin D was 8, recommend supplement weekly 50k  (gissell recommend recheck Vit D PTH Ca for def at follow up labs for 2nd hyperparathyroid)

## 2021-10-25 NOTE — ASSESSMENT & PLAN NOTE
*uncontrolled  Continue meds, diet for now with calorie restrict  Low fat diet, intolerant of statins in the past due to myalgia  Last lipid cholesterol >200 and LDL was 160  Consider repatha and workup for familial hyperlipidemia, recheck lipid ordered  Goal LDL <

## 2021-10-25 NOTE — ASSESSMENT & PLAN NOTE
*chronic tobacco use, >30 pack year history, recommend cessation with therapy, meds or gum patches  LDCT Lung cancer screen in past but not charted, repeat per specialist if last ordered follows with Pulm/Sleep medicine in [place] for [place]ILEY, may have help with her insomnia additionally

## 2021-10-25 NOTE — ASSESSMENT & PLAN NOTE
*uncontrolled  a1c goal <7-7.5, repeat today but glucose not improve  Last a1c >10 on 5/2021  Using insulin long term short and long acting   Glucose fasting >200-300 on average she reports  Continue Tresiba  Hold taking novolog with meals for a week but check sugar, start Trulicity today instructions on how to inject at home 1x weekly and side effects, nausea  Titrate up to 1.5mg in a week or 2, provided samples of 2 injections  Eye exam 6/2021 with macular edema, need to get a1c 7.5 will add jardiance or similar as we hopefully improve to fasting <120-150 and then work on meal time glucose if elevating a1c  Recommend diabetic foot exam      Continue medicines as prescribed  Recheck a1c 3mo after last  Goal fasting sugar <120-150  Up to date on vax  Foot and eye exam recommended annually

## 2021-10-25 NOTE — ASSESSMENT & PLAN NOTE
*counseled, no better with melatonin, sleep hygiene  Trazodone trial 50mg at night, titrate up to 2 a few hours before bed over next 1-2 weeks  Counseled on medicines ambien lunesta are more common controlled medicines for sleep with risk, Tucker reviewed, low risk diversion, consider at follow up

## 2021-11-01 ENCOUNTER — OFFICE VISIT (OUTPATIENT)
Dept: FAMILY MEDICINE CLINIC | Facility: CLINIC | Age: 61
End: 2021-11-01

## 2021-11-01 VITALS
BODY MASS INDEX: 44.41 KG/M2 | OXYGEN SATURATION: 99 % | DIASTOLIC BLOOD PRESSURE: 67 MMHG | WEIGHT: 293 LBS | RESPIRATION RATE: 20 BRPM | TEMPERATURE: 98 F | HEIGHT: 68 IN | HEART RATE: 76 BPM | SYSTOLIC BLOOD PRESSURE: 144 MMHG

## 2021-11-01 DIAGNOSIS — E03.9 HYPOTHYROIDISM, UNSPECIFIED TYPE: Chronic | ICD-10-CM

## 2021-11-01 DIAGNOSIS — E11.65 TYPE 2 DIABETES MELLITUS WITH HYPERGLYCEMIA, WITH LONG-TERM CURRENT USE OF INSULIN (HCC): Primary | Chronic | ICD-10-CM

## 2021-11-01 DIAGNOSIS — E66.01 MORBID OBESITY WITH BODY MASS INDEX (BMI) OF 50.0 TO 59.9 IN ADULT (HCC): Chronic | ICD-10-CM

## 2021-11-01 DIAGNOSIS — Z79.4 TYPE 2 DIABETES MELLITUS WITH HYPERGLYCEMIA, WITH LONG-TERM CURRENT USE OF INSULIN (HCC): Primary | Chronic | ICD-10-CM

## 2021-11-01 DIAGNOSIS — F51.01 PRIMARY INSOMNIA: ICD-10-CM

## 2021-11-01 PROCEDURE — 99214 OFFICE O/P EST MOD 30 MIN: CPT | Performed by: FAMILY MEDICINE

## 2021-11-01 RX ORDER — LEVOTHYROXINE SODIUM 0.2 MG/1
200 TABLET ORAL DAILY
Qty: 90 TABLET | Refills: 3 | Status: SHIPPED | OUTPATIENT
Start: 2021-11-01 | End: 2023-01-09

## 2021-11-01 RX ORDER — METOPROLOL SUCCINATE 50 MG/1
50 TABLET, EXTENDED RELEASE ORAL DAILY
Qty: 90 TABLET | Refills: 3 | Status: SHIPPED | OUTPATIENT
Start: 2021-11-01 | End: 2022-12-22

## 2021-11-01 RX ORDER — ESCITALOPRAM OXALATE 20 MG/1
20 TABLET ORAL DAILY
Qty: 90 TABLET | Refills: 3 | Status: SHIPPED | OUTPATIENT
Start: 2021-11-01 | End: 2022-11-14

## 2021-11-01 RX ORDER — DULAGLUTIDE 0.75 MG/.5ML
1 INJECTION, SOLUTION SUBCUTANEOUS DAILY
Qty: 2 PEN | Refills: 0 | Status: CANCELLED | COMMUNITY
Start: 2021-11-01

## 2021-11-01 RX ORDER — PANTOPRAZOLE SODIUM 40 MG/1
40 TABLET, DELAYED RELEASE ORAL DAILY
Qty: 90 TABLET | Refills: 3 | Status: SHIPPED | OUTPATIENT
Start: 2021-11-01 | End: 2022-01-30

## 2021-11-01 RX ORDER — INSULIN DEGLUDEC 200 U/ML
48 INJECTION, SOLUTION SUBCUTANEOUS 2 TIMES DAILY
Qty: 5 PEN | Refills: 11 | Status: SHIPPED | OUTPATIENT
Start: 2021-11-01 | End: 2022-02-21

## 2021-11-01 NOTE — PROGRESS NOTES
"Chief Complaint  Diabetes (Medication follow up.)    Subjective          Ifrah Robertson presents to [facility]    Presents for 2-week follow-up last seen on 10/18/2021, started on trulicity and trazodone last appt for insomnia and diabetes.     We followed up with patient later on after starting medications and she feels like this is helping significantly except the trazodone; she didn't like it made feel dizzy and knows other meds are risky for her like ambien, etc.    Last A1c was greater than 10.  We advised doing Trulicity or mealtime insulins and consider restarting to improve further if glucose prior to meals were elevated such as sliding scale and titrate up Trulicity as able and tolerated.  However, she hardly eats she states, yet glucose will be elevated regardless of how much or little she eats. Denies eating a lot of carbs or sweet, lists off vegetables and low car items she eats most days.     Still taking Tresiba 2x daily, 48 units each time. On synthroid for thyroid 200mcg, metoprolol for heart and hypertension but not on ACE/ARB, blood pressure today 144/67.     Objective   Vital Signs:   /67   Pulse 76   Temp 98 °F (36.7 °C)   Resp 20   Ht 172.7 cm (68\")   Wt (!) 148 kg (326 lb)   SpO2 99%   BMI 49.57 kg/m²       Physical Exam  Vitals reviewed.   Constitutional:       Appearance: Normal appearance. She is well-developed.   HENT:      Head: Normocephalic and atraumatic.      Right Ear: External ear normal.      Left Ear: External ear normal.      Mouth/Throat:      Pharynx: No oropharyngeal exudate.   Eyes:      Conjunctiva/sclera: Conjunctivae normal.      Pupils: Pupils are equal, round, and reactive to light.   Cardiovascular:      Rate and Rhythm: Normal rate and regular rhythm.      Heart sounds: No murmur heard.  No friction rub. No gallop.    Pulmonary:      Effort: Pulmonary effort is normal.      Breath sounds: Normal breath sounds. No " wheezing or rhonchi.   Abdominal:      General: Bowel sounds are normal. There is no distension.      Palpations: Abdomen is soft.      Tenderness: There is no abdominal tenderness.   Skin:     General: Skin is warm and dry.   Neurological:      Mental Status: She is alert and oriented to person, place, and time.      Cranial Nerves: No cranial nerve deficit.   Psychiatric:         Mood and Affect: Mood and affect normal.         Behavior: Behavior normal.         Thought Content: Thought content normal.         Judgment: Judgment normal.          Result Review :   The following data was reviewed by: Rashid Acosta DO on 11/01/2021:  Common labs    Common Labsle 5/3/21 5/3/21 10/18/21 10/18/21 10/18/21 10/18/21    1834 1834 1611 1611 1611 1611   Glucose  320 (A)   245 (A)    BUN  12   7 (A)    Creatinine  0.90   0.72    eGFR Non African Am     83    Sodium  137   137    Potassium  4.7   4.9    Chloride  96 (A)   97 (A)    Calcium  8.6 (A)   8.7    Albumin  4.2   4.10    Total Bilirubin  0.43   0.3    Alkaline Phosphatase  73   72    AST (SGOT)  11 (A)   14    ALT (SGPT)  11   11    WBC 12.56 (A)  12.54 (A)      Hemoglobin 15.3  14.1      Hematocrit 47.8 (A)  45.2      Platelets 313  345      Total Cholesterol      263 (A)   Total Cholesterol  253 (A)       Triglycerides  244 (A)    254 (A)   HDL Cholesterol  41    38 (A)   LDL Cholesterol   163 (A)    176 (A)   Hemoglobin A1C  10.3 (A)  11.13 (A)     (A) Abnormal value       Comments are available for some flowsheets but are not being displayed.           Lipid Panel    Lipid Panel 5/3/21 10/18/21   Total Cholesterol  263 (A)   Total Cholesterol 253 (A)    Triglycerides 244 (A) 254 (A)   HDL Cholesterol 41 38 (A)   VLDL Cholesterol 49 (A) 49 (A)   LDL Cholesterol  163 (A) 176 (A)   LDL/HDL Ratio  4.58   (A) Abnormal value       Comments are available for some flowsheets but are not being displayed.           TSH    TSH 5/3/21 10/18/21   TSH 21.030 (A) 7.810 (A)   (A)  Abnormal value            Most Recent A1C    HGBA1C Most Recent 10/18/21   Hemoglobin A1C 11.13 (A)   (A) Abnormal value                         Assessment and Plan    Diagnoses and all orders for this visit:    1. Type 2 diabetes mellitus with hyperglycemia, with long-term current use of insulin ([facility]) (Primary)  Assessment & Plan:  *managed  Lab Results   Component Value Date    HGBA1C 11.13 (H) 10/18/2021     Repeat due 1/18/2022  a1c goal <7-7.5   Continue tresiba 48 units 2x daily  Increased trulicity to 1.5mg weekly, fu 2-4 weeks  Goal fasting sugar <120-150  Up to date on vax  Foot and eye exam recommended annually           2. Morbid obesity with body mass index (BMI) of 50.0 to 59.9 in adult ([facility])  Assessment & Plan:  Patient's (Body mass index is 49.57 kg/m².) indicates that they are morbidly obese (BMI > 40 or > 35 with obesity - related health condition) with health conditions that include obstructive sleep apnea, hypertension, diabetes mellitus and dyslipidemias . Weight is unchanged. BMI is is above average; BMI management plan is completed. We discussed portion control and increasing exercise.       3. Hypothyroidism, unspecified type  Assessment & Plan:  *controlled  Continue synthroid, no symptoms weight gain or lost, nodules or other  Last TSH improved from previous, down to 7.81 was >20  Lab Results   Component Value Date    TSH 7.810 (H) 10/18/2021     recommend repeat with next routine labs         4. Primary insomnia  Assessment & Plan:  *chronic, discussed,  Failed trazodone, risk with controlled hypnotics  recurrent insomnia is discussed  Avoidance of caffeine sources is strongly encouraged  Sleep hygiene issues are reviewed      Other orders  -     Tresiba FlexTouch 200 UNIT/ML solution pen-injector pen injection; Inject 48 Units under the skin into the appropriate area as directed 2 (Two) Times a Day.  Dispense: 5 pen; Refill: 11  -     pantoprazole (PROTONIX) 40 MG EC tablet; Take 1 tablet  by mouth Daily for 90 days.  Dispense: 90 tablet; Refill: 3  -     metoprolol succinate XL (TOPROL-XL) 50 MG 24 hr tablet; Take 1 tablet by mouth Daily for 90 days.  Dispense: 90 tablet; Refill: 3  -     levothyroxine (SYNTHROID, LEVOTHROID) 200 MCG tablet; Take 1 tablet by mouth Daily for 90 days.  Dispense: 90 tablet; Refill: 3  -     escitalopram (LEXAPRO) 20 MG tablet; Take 1 tablet by mouth Daily.  Dispense: 90 tablet; Refill: 3      Follow Up   Return in about 4 weeks (around 11/29/2021), or if symptoms worsen or fail to improve, for Labs before, Medicare Wellness, Lung Ca screen.  Patient was given instructions and counseling regarding her condition or for health maintenance advice. Please see specific information pulled into the AVS if appropriate.

## 2021-11-04 PROBLEM — E03.9 HYPOTHYROIDISM: Chronic | Status: ACTIVE | Noted: 2021-10-18

## 2021-11-04 NOTE — ASSESSMENT & PLAN NOTE
*chronic, discussed,  Failed trazodone, risk with controlled hypnotics  recurrent insomnia is discussed  Avoidance of caffeine sources is strongly encouraged  Sleep hygiene issues are reviewed

## 2021-11-04 NOTE — ASSESSMENT & PLAN NOTE
Patient's (Body mass index is 49.57 kg/m².) indicates that they are morbidly obese (BMI > 40 or > 35 with obesity - related health condition) with health conditions that include obstructive sleep apnea, hypertension, diabetes mellitus and dyslipidemias . Weight is unchanged. BMI is is above average; BMI management plan is completed. We discussed portion control and increasing exercise.

## 2021-11-04 NOTE — ASSESSMENT & PLAN NOTE
*controlled  Continue synthroid, no symptoms weight gain or lost, nodules or other  Last TSH improved from previous, down to 7.81 was >20  Lab Results   Component Value Date    TSH 7.810 (H) 10/18/2021     recommend repeat with next routine labs

## 2021-11-04 NOTE — ASSESSMENT & PLAN NOTE
*managed  Lab Results   Component Value Date    HGBA1C 11.13 (H) 10/18/2021     Repeat due 1/18/2022  a1c goal <7-7.5   Continue tresiba 48 units 2x daily  Increased trulicity to 1.5mg weekly, fu 2-4 weeks  Goal fasting sugar <120-150  Up to date on vax  Foot and eye exam recommended annually

## 2021-11-29 RX ORDER — DULAGLUTIDE 0.75 MG/.5ML
1 INJECTION, SOLUTION SUBCUTANEOUS WEEKLY
Qty: 4 PEN | Refills: 11 | COMMUNITY
Start: 2021-11-29 | End: 2022-01-03

## 2021-12-20 ENCOUNTER — TELEPHONE (OUTPATIENT)
Dept: FAMILY MEDICINE CLINIC | Facility: CLINIC | Age: 61
End: 2021-12-20

## 2021-12-20 NOTE — TELEPHONE ENCOUNTER
Caller: Ifrah Robertson    Relationship to patient: Self    Best call back number: [phone] OKAY TO LEAVE A MESSAGE ON PHONE    Patient is needing: PATIENT SAID SHE WOULD LIKE A CALL BACK FROM DR. BOWIE PLEASE. PATIENT SAID IT IS REGARDING THE MEDICATION TRULICITY. SHE SAID SHE WANTED TO SPEAK WITH DR. BOWIE.

## 2021-12-20 NOTE — TELEPHONE ENCOUNTER
Called and spoke with pt she was very upset stating she has been trying to get medication for two weeks now and has been out of it. I apologized to her and got her samples ready to . Pt stated she would be picking these up and that she thinks she needs to start looking for another provider. She has been out of the medication for over a week now.

## 2022-01-03 ENCOUNTER — OFFICE VISIT (OUTPATIENT)
Dept: FAMILY MEDICINE CLINIC | Facility: CLINIC | Age: 62
End: 2022-01-03

## 2022-01-03 VITALS
OXYGEN SATURATION: 99 % | WEIGHT: 293 LBS | HEIGHT: 68 IN | BODY MASS INDEX: 44.41 KG/M2 | SYSTOLIC BLOOD PRESSURE: 139 MMHG | HEART RATE: 71 BPM | DIASTOLIC BLOOD PRESSURE: 67 MMHG

## 2022-01-03 DIAGNOSIS — E11.65 TYPE 2 DIABETES MELLITUS WITH HYPERGLYCEMIA, WITH LONG-TERM CURRENT USE OF INSULIN: Primary | Chronic | ICD-10-CM

## 2022-01-03 DIAGNOSIS — Z79.4 TYPE 2 DIABETES MELLITUS WITH HYPERGLYCEMIA, WITH LONG-TERM CURRENT USE OF INSULIN: Primary | Chronic | ICD-10-CM

## 2022-01-03 DIAGNOSIS — E78.00 HIGH CHOLESTEROL: Chronic | ICD-10-CM

## 2022-01-03 DIAGNOSIS — F32.5 MAJOR DEPRESSIVE DISORDER IN FULL REMISSION, UNSPECIFIED WHETHER RECURRENT: ICD-10-CM

## 2022-01-03 DIAGNOSIS — Z12.31 SCREENING MAMMOGRAM FOR BREAST CANCER: ICD-10-CM

## 2022-01-03 DIAGNOSIS — N93.9 ABNORMAL UTERINE BLEEDING: ICD-10-CM

## 2022-01-03 DIAGNOSIS — E66.01 MORBID OBESITY WITH BODY MASS INDEX (BMI) OF 50.0 TO 59.9 IN ADULT: Chronic | ICD-10-CM

## 2022-01-03 DIAGNOSIS — E03.9 HYPOTHYROIDISM, UNSPECIFIED TYPE: Chronic | ICD-10-CM

## 2022-01-03 PROCEDURE — 99214 OFFICE O/P EST MOD 30 MIN: CPT | Performed by: FAMILY MEDICINE

## 2022-01-03 RX ORDER — DULAGLUTIDE 1.5 MG/.5ML
1 INJECTION, SOLUTION SUBCUTANEOUS WEEKLY
Qty: 12 PEN | Refills: 3 | Status: SHIPPED | OUTPATIENT
Start: 2022-01-03 | End: 2022-02-21

## 2022-01-03 RX ORDER — LANOLIN ALCOHOL/MO/W.PET/CERES
1000 CREAM (GRAM) TOPICAL DAILY
COMMUNITY

## 2022-01-03 RX ORDER — DULAGLUTIDE 0.75 MG/.5ML
1 INJECTION, SOLUTION SUBCUTANEOUS WEEKLY
Qty: 4 PEN | Refills: 11 | Status: CANCELLED | COMMUNITY
Start: 2022-01-03

## 2022-01-03 NOTE — PROGRESS NOTES
"Chief Complaint  Follow-up ( follow up on diabetes), fluid in ears, and vaginal bleeding (bright red clots for 4 months with cramping)    Subjective          Ifrah Robertson presents to [facility]  History of Present Illness    Presents to follow up on diabetes, complained she had been out of her trulicity we started her on and needed refilled, was able to get her more samples prior todays visit so she has some and will refill today.     She is having vaginal bleeding also which worries her. She is postmenopausal and not had a hysterectomy, started having abnormal uterine bleeding several months ago but has been hoping it would resolve itself but has not. Has some family history of cancers.    She has history of hypothyroidism on synthroid 200mcg daily, no hot cold issues, thyroid nodule or other. Takes Zetia for hyperlipidemia, last LDL was 176 and cholesterol was 263 on 10/2021, a stronger cholesterol medicine would be preferred to lower her risk given her risk factors and comorbidities. Statin is not listed as an allergy but we have discussed.     Objective   Vital Signs:   /67 (BP Location: Left arm, Patient Position: Sitting, Cuff Size: Adult)   Pulse 71   Ht 172.7 cm (68\")   Wt (!) 149 kg (328 lb 9.6 oz)   SpO2 99%   BMI 49.96 kg/m²     Physical Exam  Vitals reviewed.   Constitutional:       Appearance: Normal appearance. She is well-developed.   HENT:      Head: Normocephalic and atraumatic.      Right Ear: External ear normal.      Left Ear: External ear normal.      Mouth/Throat:      Pharynx: No oropharyngeal exudate.   Eyes:      Conjunctiva/sclera: Conjunctivae normal.      Pupils: Pupils are equal, round, and reactive to light.   Cardiovascular:      Rate and Rhythm: Normal rate.   Pulmonary:      Effort: Pulmonary effort is normal.   Abdominal:      General: Abdomen is flat. There is no distension.      Palpations: Abdomen is soft.   Skin:     General: " Skin is warm and dry.   Neurological:      General: No focal deficit present.      Mental Status: She is alert and oriented to person, place, and time.   Psychiatric:         Mood and Affect: Mood and affect normal.         Behavior: Behavior normal.         Thought Content: Thought content normal.         Judgment: Judgment normal.        Result Review :   The following data was reviewed by: Rashid Acotsa DO on 01/03/2022:  Hemoglobin A1C   Date/Time Value Ref Range Status   10/18/2021 1611 11.13 (H) 4.80 - 5.60 % Final   05/03/2021 1834 10.3 (H) 3.5 - 5.7 % Final     Comment:     **Interpretation**  <7% in Controlled Diabetic Patients.  Assay Range 3.4-18.2%  ADA 2010 Standards of Medical Care in Diabetes suggest using  a cut point of >= 6.5% for diagnosis of diabetes and an A1C  range of 5.7%-6.4% as a category of increased risk for future  diabetes.     05/05/2020 1313 10.8 (H) 3.5 - 5.7 % Final     Comment:     **Interpretation**  <7% in Controlled Diabetic Patients.  Assay Range 3.4-18.2%  ADA 2010 Standards of Medical Care in Diabetes suggest using  a cut point of >= 6.5% for diagnosis of diabetes and an A1C  range of 5.7%-6.4% as a category of increased risk for future  diabetes.       Microalbumin   Date/Time Value Ref Range Status   05/03/2021 1834 <12.0 0.0 - 20.0 mg/L Final   05/05/2020 1301 15.3 0.0 - 20.0 mg/L Final     Hemoglobin   Date/Time Value Ref Range Status   10/18/2021 1611 14.1 12.0 - 15.9 g/dL Final   05/03/2021 1834 15.3 12.0 - 16.0 g/dL Final   05/05/2020 1313 13.9 12.0 - 16.0 g/dL Final   10/03/2019 1335 11.7 (L) 12.0 - 16.0 g/dL Final     LDL Cholesterol    Date/Time Value Ref Range Status   10/18/2021 1611 176 (H) 0 - 100 mg/dL Final   05/03/2021 1834 163 (H) 70 - 100 mg/dL Final     Comment:     Recommended  LDL Levels  <100 mg/dL-Optimal  100-129 mg/dL-Near Optimal/above optimal  130-159 mg/dL-Borderline High  160-189 mg/dL-High  >190 mg/dL-Very High     05/05/2020 1313 165 (H) 70 -  100 mg/dL Final     Comment:     Recommended  LDL Levels  <100 mg/dL-Optimal  100-129 mg/dL-Near Optimal/above optimal  130-159 mg/dL-Borderline High  160-189 mg/dL-High  >190 mg/dL-Very High       Triglycerides   Date/Time Value Ref Range Status   10/18/2021 1611 254 (H) 0 - 150 mg/dL Final   05/03/2021 1834 244 (H) 40 - 150 mg/dL Final     Comment:     <150 mg/dL-Normal  150-199 mg/dL-Borderline High  200-499 mg/dL-High  >500 mg/dL- Very High     05/05/2020 1313 250 (H) 40 - 150 mg/dL Final     Comment:     <150 mg/dL-Normal  150-199 mg/dL-Borderline High  200-499 mg/dL-High  >500 mg/dL- Very High       GFR   Date/Time Value Ref Range Status   05/03/2021 1834 >60 >60 mL/min/[1.73_m2] Final     Comment:     Interpretative Data:  ------------------------------------  STAGE                  GFR  Stage 1                90 mL/min or greater  Stage 2                60-89 mL/min  Stage 3                30-59 mL/min  Stage 4                15-29 mL/min  Value <60 mL/min for 3 or more months is defined as CKD.     05/05/2020 1313 >60 >60 mL/min/[1.73_m2] Final     Comment:     Interpretative Data:  ------------------------------------  STAGE                  GFR  Stage 1                90 mL/min or greater  Stage 2                60-89 mL/min  Stage 3                30-59 mL/min  Stage 4                15-29 mL/min  Value <60 mL/min for 3 or more months is defined as CKD.     10/03/2019 1335 >60 >60 mL/min/[1.73_m2] Final     Comment:     Interpretative Data:  ------------------------------------  STAGE                  GFR  Stage 1                90 mL/min or greater  Stage 2                60-89 mL/min  Stage 3                30-59 mL/min  Stage 4                15-29 mL/min  Value <60 mL/min for 3 or more months is defined as CKD.       TSH   Date/Time Value Ref Range Status   10/18/2021 1611 7.810 (H) 0.270 - 4.200 uIU/mL Final   05/03/2021 1834 21.030 (H) 0.270 - 4.200 m[iU]/L Final   05/05/2020 1313 21.690 (H) 0.270  - 4.200 m[iU]/L Final     ALT (SGPT)   Date/Time Value Ref Range Status   10/18/2021 1611 11 1 - 33 U/L Final   05/03/2021 1834 11 10 - 40 U/L Final   05/05/2020 1313 9 (L) 10 - 40 U/L Final   10/03/2019 1335 14 10 - 40 U/L Final     AST (SGOT)   Date/Time Value Ref Range Status   10/18/2021 1611 14 1 - 32 U/L Final   05/03/2021 1834 11 (L) 15 - 50 U/L Final   05/05/2020 1313 13 (L) 15 - 50 U/L Final   10/03/2019 1335 15 15 - 50 U/L Final     Vitamin B-12   Date/Time Value Ref Range Status   10/18/2021 1611 369 211 - 946 pg/mL Final   05/03/2021 1834 332 211 - 911 pg/mL Final   05/05/2020 1313 395 211 - 911 pg/mL Final     Folate   Date/Time Value Ref Range Status   10/18/2021 1611 7.70 4.78 - 24.20 ng/mL Final   05/03/2021 1834 11.0 4.8 - 20.0 ng/mL Final     Comment:     Results may be falsely decreased due to light exposure if  testing added on after collection.       Iron   Date/Time Value Ref Range Status   10/18/2021 1611 53 37 - 145 mcg/dL Final   05/03/2021 1834 67 60 - 170 ug/dL Final   05/06/2019 1549 50 37 - 181 ug/dL Final     TIBC   Date/Time Value Ref Range Status   10/18/2021 1611 393 298 - 536 mcg/dL Final   05/03/2021 1834 369 245 - 450 ug/dL Final     Comment:     As of 10/15/03, the chemistry department began utilizing a Transferrin  assay. Data suggests that Transferrin is a better estimate of Total Iron  binding capacity than the TIBC assay. As a result the TIBC will now be a  calculated estimate from the measured Transferrin.     05/06/2019 1549 400 265 - 497 ug/dL Final     Iron Saturation   Date/Time Value Ref Range Status   10/18/2021 1611 13 (L) 20 - 50 % Final   05/03/2021 1834 18 (L) 20 - 55 % Final   05/06/2019 1549 13 (L) 15 - 55 % Final     Reviewed labs, last a1c was 11.13 on 10/2021 and we add trulicity around that time and have discussed titrating up the dose to as high as 4.5. Her cholesterol is also very elevated and not on statin. Her last TSH was 10/2021 and elevated and we  adjusted her thyroid medicine, would recommend rechecking all these this month and continue to work on adjusting medicine to better manage her conditions to appropriate goal     Data reviewed: Radiologic studies last Mammogram in the chart 2019 birads 1 repeat order for screemning toeday          Assessment and Plan    Diagnoses and all orders for this visit:    1. Type 2 diabetes mellitus with hyperglycemia, with long-term current use of insulin ([facility]) (Primary)  Assessment & Plan:  *managing, uncontrolled 10/2021 with a1c >11  a1c goal <7-7.5 eventually, approaching at least <9 for now  Continue on trulicity, titrate up to 4.5mg every few weeks if able and consider adding another medicine also to meet goals or will consider having her see another specialist  Continue medicines as prescribed  Recheck a1c in the next month if improving   fasting glucose around 120-150  Up to date on vax  Foot and eye exam recommended annually, AWV at follow up If indicated       Orders:  -     Dulaglutide (Trulicity) 1.5 MG/0.5ML solution pen-injector; Inject 1.5 mg under the skin into the appropriate area as directed 1 (One) Time Per Week for 180 days. Indications: Type 2 Diabetes  Dispense: 12 pen; Refill: 3    2. Morbid obesity with body mass index (BMI) of 50.0 to 59.9 in adult ([facility])    3. Abnormal uterine bleeding  Assessment & Plan:  Abnormal bleeding, postmenopausal, started a few months ago she states, she is very concerned could be cancer or other, advised we would refer to GYN or she could call and try to get in sooner if any she had seen before  Counseled on evaluation and screening, glad she came in     Orders:  -     Ambulatory Referral to Gynecology    4. Screening mammogram for breast cancer  Assessment & Plan:  Screening per protocol for age annually    Orders:  -     Mammo Screening Bilateral With CAD; Future    5. Major depressive disorder in full remission, unspecified whether recurrent ([facility])  Assessment &  Plan:  *controlled  On SSRI lexapro, continue no changes in mood or other including no SI/HI  Follow up every 3-6 months or sooner if worse symptoms or concerns        6. High cholesterol  Assessment & Plan:  *uncontrolled, on Zetia  Lab Results   Component Value Date    CHOL 263 (H) 10/18/2021    CHLPL 253 (H) 05/03/2021    TRIG 254 (H) 10/18/2021    HDL 38 (L) 10/18/2021     (H) 10/18/2021     Goal LDL <    The ASCVD Risk score (Hosea MICHAEL Jr., et al., 2013) failed to calculate for the following reasons:    The patient has a prior MI or stroke diagnosis        7. Hypothyroidism, unspecified type  Assessment & Plan:  *controlled  Continue synthroid, no symptoms weight gain or lost, nodules or other  Last TSH was above 7, adjusted medicine and will recheck before next fu in a month or so  recommend repeat with next routine labs           Follow Up   Return in about 6 weeks (around 2/14/2022), or if symptoms worsen or fail to improve, for Labs before, Medicare Wellness.  Patient was given instructions and counseling regarding her condition or for health maintenance advice. Please see specific information pulled into the AVS if appropriate.       Your Scheduled Appointments    Feb 15, 2022  2:30 PM  Office Visit with Rashid Acosta DO  [facility] ([facility]) Raj RANULFODOE DYKES [address] [place] [id number] [phone]   Arrive 15 minutes prior to appointment.  If you are in need of a language or hearing  please call the Department.     Mar 02, 2022  9:45 AM  New Gynecological with Kim Meeks MD  [facility] ([facility]) [address] [place] [address] [address] [place] [address] [phone]           The patient left without being seen.

## 2022-01-17 PROBLEM — F32.A DEPRESSION: Chronic | Status: ACTIVE | Noted: 2021-10-24

## 2022-01-17 NOTE — ASSESSMENT & PLAN NOTE
*uncontrolled, on Zetia  Lab Results   Component Value Date    CHOL 263 (H) 10/18/2021    CHLPL 253 (H) 05/03/2021    TRIG 254 (H) 10/18/2021    HDL 38 (L) 10/18/2021     (H) 10/18/2021     Goal LDL <    The ASCVD Risk score (Hosea RODRIGUEZ Jr., et al., 2013) failed to calculate for the following reasons:    The patient has a prior MI or stroke diagnosis

## 2022-01-17 NOTE — PATIENT INSTRUCTIONS
Blood Glucose Monitoring, Adult  Monitoring your blood sugar (glucose) is an important part of managing your diabetes. Blood glucose monitoring involves checking your blood glucose as often as directed and keeping a log or record of your results over time.  Checking your blood glucose regularly and keeping a blood glucose log can:  · Help you and your health care provider adjust your diabetes management plan as needed, including your medicines or insulin.  · Help you understand how food, exercise, illnesses, and medicines affect your blood glucose.  · Let you know what your blood glucose is at any time. You can quickly find out if you have low blood glucose (hypoglycemia) or high blood glucose (hyperglycemia).  Your health care provider will set individualized treatment goals for you. Your goals will be based on your age, other medical conditions you have, and how you respond to diabetes treatment. Generally, the goal of treatment is to maintain the following blood glucose levels:  · Before meals (preprandial):  mg/dL (4.4-7.2 mmol/L).  · After meals (postprandial): below 180 mg/dL (10 mmol/L).  · A1c level: less than 7%.  Supplies needed:  · Blood glucose meter.  · Test strips for your meter. Each meter has its own strips. You must use the strips that came with your meter.  · A needle to prick your finger (lancet). Do not use a lancet more than one time.  · A device that holds the lancet (lancing device).  · A journal or log book to write down your results.  How to check your blood glucose    Checking your blood glucose  1. Wash your hands for at least 20 seconds with soap and water.  2. Prick the side of your finger (not the tip) with the lancet. Use a different finger each time.  3. Gently rub the finger until a small drop of blood appears.  4. Follow instructions that come with your meter for inserting the test strip, applying blood to the strip, and using your blood glucose meter.  5. Write down your  result and any notes.  Using alternative sites  Some meters allow you to use areas of your body other than your finger (alternative sites) to test your blood. The most common alternative sites are the forearm, the thigh, and the palm of your hand.  Alternative sites may not be as accurate as the fingers because blood flow is slower in those areas. This means that the result you get may be delayed, and it may be different from the result that you would get from your finger.  Use the finger only, and do not use alternative sites, if:  · You think you have hypoglycemia.  · You sometimes do not know that your blood glucose is getting low (hypoglycemia unawareness).  General tips and recommendations  Blood glucose log    · Every time you check your blood glucose, write down your result. Also write down any notes about things that may be affecting your blood glucose, such as your diet and exercise for the day. This information can help you and your health care provider:  ? Look for patterns in your blood glucose over time.  ? Adjust your diabetes management plan as needed.  · Check if your meter allows you to download your records to a computer. Most glucose meters store a record of glucose readings in the meter.    If you have type 1 diabetes:  · Check your blood glucose 4 or more times a day if you are on intensive insulin therapy with multiple daily injections (MDI) or are using an insulin pump. Check your blood glucose:  ? Before every meal and snack.  ? Before bedtime.  · Also check your blood glucose:  ? If you have symptoms of hypoglycemia.  ? After treating a low blood glucose.  ? Before doing activities that create a risk for injury, like driving or using heavy machinery.  ? Before and after exercise.  ? 2 hours after a meal.  ? Occasionally between 2 a.m. and 3 a.m., as directed.  · You may need to check your blood glucose more often, 6-10 times per day if:  ? You have diabetes that is not well  controlled.  ? You are ill.  ? You have a history of severe hypoglycemia.  ? You have hypoglycemia unawareness.  If you have type 2 diabetes:  · If you take insulin or other diabetes medicines, check your blood glucose 2 or more times a day.  · If you are on intensive insulin therapy, check your blood glucose 4 or more times a day. Occasionally, you may also need to check your glucose between 2 a.m. and 3 a.m., as directed.  · Also check your blood glucose:  ? Before and after exercise.  ? Before doing activities that create a risk for injury, like driving or using heavy machinery.  · You may need to check your blood glucose more often if:  ? Your medicine is being adjusted.  ? Your diabetes is not well controlled.  ? You are ill.  General tips  · Make sure you always have your supplies with you.  · After you use a few boxes of test strips, adjust (calibrate) your blood glucose meter by following instructions that came with your meter.  · If you have questions or need help, all blood glucose meters have a 24-hour hotline phone number that you can call. You may also contact your health care provider.  Where to find more information  · American Diabetes Association: www.diabetes.org  Contact a health care provider if:  · Your blood glucose is at or above 240 mg/dL (13.3 mmol/L) for 2 days in a row.  · You have been sick or have had a fever for 2 days or longer, and you are not getting better.  · You have any of the following problems for more than 6 hours:  ? You cannot eat or drink.  ? You have nausea or vomiting.  ? You have diarrhea.  Get help right away if:  · Your blood glucose is lower than 54 mg/dL (3 mmol/L).  · You become confused or you have trouble thinking clearly.  · You have difficulty breathing.  · You have moderate or large ketone levels in your urine.  Summary  · Monitoring your blood sugar (glucose) is an important part of managing your diabetes.  · Blood glucose monitoring involves checking your  blood glucose as often as directed and keeping a log or record of your results over time.  · Your health care provider will set individualized treatment goals for you. Your goals will be based on your age, other medical conditions you have, and how you respond to diabetes treatment.  · Every time you check your blood glucose, write down your result. Also write down any notes about things that may be affecting your blood glucose, such as your diet and exercise for the day.  This information is not intended to replace advice given to you by your health care provider. Make sure you discuss any questions you have with your health care provider.  Document Revised: 10/30/2020 Document Reviewed: 11/02/2020  [employer] Patient Education © 2021 [employer]      Daily Diabetes Record    Check your blood glucose (BG) as directed by your health care provider. Use this form to record your BG results as well as any diabetes medicines that you take, including insulin.  A record of your BG results and a list of your current medicines are very helpful in managing your diabetes. Your BG numbers help your health care provider know whether your diabetes management plan needs to be changed.  Patient name: ____________________________________ Week of ____________________  Daily BG results and diabetes medicines  Date: _________  · Breakfast - BG / Medicines: ________________ / __________________________________________________________  · Lunch - BG / Medicines: ___________________ / __________________________________________________________  · Dinner - BG / Medicines: __________________ / __________________________________________________________  · Bedtime - BG / Medicines: ________________ / ___________________________________________________________  Date: _________  · Breakfast - BG / Medicines: ________________ / __________________________________________________________  · Lunch - BG / Medicines: ___________________ /  __________________________________________________________  · Dinner - BG / Medicines: __________________ / __________________________________________________________  · Bedtime - BG / Medicines: ________________ / ___________________________________________________________  Date: _________  · Breakfast - BG / Medicines: ________________ / __________________________________________________________  · Lunch - BG / Medicines: ___________________ / __________________________________________________________  · Dinner - BG / Medicines: __________________ / __________________________________________________________  · Bedtime - BG / Medicines: ________________ / ___________________________________________________________  Date: _________  · Breakfast - BG / Medicines: ________________ / __________________________________________________________  · Lunch - BG / Medicines: ___________________ / __________________________________________________________  · Dinner - BG / Medicines: __________________ / __________________________________________________________  · Bedtime - BG / Medicines: ________________ / ___________________________________________________________  Date: _________  · Breakfast - BG / Medicines: ________________ / __________________________________________________________  · Lunch - BG / Medicines: ___________________ / __________________________________________________________  · Dinner - BG / Medicines: __________________ / __________________________________________________________  · Bedtime - BG / Medicines: ________________ / ___________________________________________________________  Date: _________  · Breakfast - BG / Medicines: ________________ / __________________________________________________________  · Lunch - BG / Medicines: ___________________ / __________________________________________________________  · Dinner - BG / Medicines: __________________ /  __________________________________________________________  · Bedtime - BG / Medicines: ________________ / ___________________________________________________________  Date: _________  · Breakfast - BG / Medicines: ________________ / __________________________________________________________  · Lunch - BG / Medicines: ___________________ / __________________________________________________________  · Dinner - BG / Medicines: __________________ / __________________________________________________________  · Bedtime - BG / Medicines: ________________ / ___________________________________________________________  Notes: ______________________________________________________________________________________________________________________  This information is not intended to replace advice given to you by your health care provider. Make sure you discuss any questions you have with your health care provider.  Document Revised: 02/01/2021 Document Reviewed: 12/22/2020  [employer] Patient Education © 2021 [employer]      Diabetes Mellitus Action Plan  Following a diabetes action plan is a way for you to manage your diabetes (diabetes mellitus) symptoms. The plan is color-coded to help you understand what actions you need to take based on any symptoms you are having.  · If you have symptoms in the red zone, you need medical care right away.  · If you have symptoms in the yellow zone, you are having problems.  · If you have symptoms in the green zone, you are doing well.  Learning about and understanding diabetes can take time. Follow the plan that you develop with your health care provider. Know the target range for your blood sugar (glucose) level, and review your treatment plan with your health care provider at each visit.  The target range for my blood sugar level is __________________________ mg/dL.  Red zone  Get medical help right away if you have any of the following symptoms:  · A blood sugar test result that is  below 54 mg/dL (3 mmol/L).  · A blood sugar test result that is at or above 240 mg/dL (13.3 mmol/L) for 2 days in a row.  · Confusion or trouble thinking clearly.  · Difficulty breathing.  · Sickness or a fever for 2 or more days that is not getting better.  · Moderate or large ketone levels in your urine.  · Feeling tired or having no energy.  If you have any red zone symptoms, do not wait to see if the symptoms will go away. Get medical help right away. Call your local emergency services (911 in the [place] Do not drive yourself to the hospital.  If you have severely low blood sugar (severe hypoglycemia) and you cannot eat or drink, you may need glucagon. Make sure a family member or close friend knows how to check your blood sugar and how to give you glucagon. You may need to be treated in a hospital for this condition.  Yellow zone  If you have any of the following symptoms, your diabetes is not under control and you may need to make some changes:  · A blood sugar test result that is at or above 240 mg/dL (13.3 mmol/L) for 2 days in a row.  · Blood sugar test results that are below 70 mg/dL (3.9 mmol/L).  · Other symptoms of hypoglycemia, such as:  ? Shaking or feeling light-headed.  ? Confusion or irritability.  ? Feeling hungry.  ? Having a fast heartbeat.  If you have any yellow zone symptoms:  · Treat your hypoglycemia by eating or drinking 15 grams of a rapid-acting carbohydrate. Follow the 15:15 rule:  ? Take 15 grams of a rapid-acting carbohydrate, such as:  § 1 tube of glucose gel.  § 4 glucose pills.  § 4 oz (120 mL) of fruit juice.  § 4 oz (120 mL) of regular (not diet) soda.  ? Check your blood sugar 15 minutes after you take the carbohydrate.  ? If the repeat blood sugar test is still at or below 70 mg/dL (3.9 mmol/L), take 15 grams of a carbohydrate again.  ? If your blood sugar does not increase above 70 mg/dL (3.9 mmol/L) after 3 tries, get medical help right away.  ? After your blood sugar returns  to normal, eat a meal or a snack within 1 hour.  · Keep taking your daily medicines as told by your health care provider.  · Check your blood sugar more often than you normally would.  ? Write down your results.  ? Call your health care provider if you have trouble keeping your blood sugar in your target range.    Green zone  These signs mean you are doing well and you can continue what you are doing to manage your diabetes:  · Your blood sugar is within your personal target range. For most people, a blood sugar level before a meal (preprandial) should be  mg/dL (4.4-7.2 mmol/L).  · You feel well, and you are able to do daily activities.  If you are in the green zone, continue to manage your diabetes as told by your health care provider. To do this:  · Eat a healthy diet.  · Exercise regularly.  · Check your blood sugar as told by your health care provider.  · Take your medicines as told by your health care provider.    Where to find more information  · American Diabetes Association (ADA): diabetes.org  · Association of Diabetes Care & Education Specialists (ADCES): diabeteseducator.org  Summary  · Following a diabetes action plan is a way for you to manage your diabetes symptoms. The plan is color-coded to help you understand what actions you need to take based on any symptoms you are having.  · Follow the plan that you develop with your health care provider. Make sure you know your personal target blood sugar level.  · Review your treatment plan with your health care provider at each visit.  This information is not intended to replace advice given to you by your health care provider. Make sure you discuss any questions you have with your health care provider.  Document Revised: 06/24/2021 Document Reviewed: 06/24/2021  [employer]vier Patient Education © 2021 [employer]      Diabetes Mellitus and Standards of Medical Care  Living with and managing diabetes (diabetes mellitus) can be complicated. Your diabetes  treatment may be managed by a team of health care providers, including:  · A physician who specializes in diabetes (endocrinologist). You might also have visits with a nurse practitioner or physician assistant.  · Nurses.  · A registered dietitian.  · A certified diabetes care and .  · An exercise specialist.  · A pharmacist.  · An eye doctor.  · A foot specialist (podiatrist).  · A dental care provider.  · A primary care provider.  · A mental health care provider.  How to manage your diabetes  You can do many things to successfully manage your diabetes. Your health care providers will follow guidelines to help you get the best quality of care. Here are general guidelines for your diabetes management plan. Your health care providers may give you more specific instructions.  Physical exams  When you are diagnosed with diabetes, and each year after that, your health care provider will ask about your medical and family history. You will have a physical exam, which may include:  · Measuring your height, weight, and body mass index (BMI).  · Checking your blood pressure. This will be done at every routine medical visit. Your target blood pressure may vary depending on your medical conditions, your age, and other factors.  · A thyroid exam.  · A skin exam.  · Screening for nerve damage (peripheral neuropathy). This may include checking the pulse in your legs and feet and the level of sensation in your hands and feet.  · A foot exam to inspect the structure and skin of your feet, including checking for cuts, bruises, redness, blisters, sores, or other problems.  · Screening for blood vessel (vascular) problems. This may include checking the pulse in your legs and feet and checking your temperature.  Blood tests  Depending on your treatment plan and your personal needs, you may have the following tests:  · Hemoglobin A1C (HbA1C). This test provides information about blood sugar (glucose) control over the  previous 2-3 months. It is used to adjust your treatment plan, if needed. This test will be done:  ? At least 2 times a year, if you are meeting your treatment goals.  ? 4 times a year, if you are not meeting your treatment goals or if your goals have changed.  · Lipid testing, including total cholesterol, LDL and HDL cholesterol, and triglyceride levels.  ? The goal for LDL is less than 100 mg/dL (5.5 mmol/L). If you are at high risk for complications, the goal is less than 70 mg/dL (3.9 mmol/L).  ? The goal for HDL is 40 mg/dL (2.2 mmol/L) or higher for men, and 50 mg/dL (2.8 mmol/L) or higher for women. An HDL cholesterol of 60 mg/dL (3.3 mmol/L) or higher gives some protection against heart disease.  ? The goal for triglycerides is less than 150 mg/dL (8.3 mmol/L).  · Liver function tests.  · Kidney function tests.  · Thyroid function tests.    Dental and eye exams    · Visit your dentist two times a year.  · If you have type 1 diabetes, your health care provider may recommend an eye exam within 5 years after you are diagnosed, and then once a year after your first exam.  ? For children with type 1 diabetes, the health care provider may recommend an eye exam when your child is age 11 or older and has had diabetes for 3-5 years. After the first exam, your child should get an eye exam once a year.  · If you have type 2 diabetes, your health care provider may recommend an eye exam as soon as you are diagnosed, and then every 1-2 years after your first exam.    Immunizations  · A yearly flu (influenza) vaccine is recommended annually for everyone 6 months or older. This is especially important if you have diabetes.  · The pneumonia (pneumococcal) vaccine is recommended for everyone 2 years or older who has diabetes. If you are age 65 or older, you may get the pneumonia vaccine as a series of two separate shots.  · The hepatitis B vaccine is recommended for adults shortly after being diagnosed with diabetes.  Adults  and children with diabetes should receive all other vaccines according to age-specific recommendations from the Centers for Disease Control and Prevention (CDC).  Mental and emotional health  Screening for symptoms of eating disorders, anxiety, and depression is recommended at the time of diagnosis and after as needed. If your screening shows that you have symptoms, you may need more evaluation. You may work with a mental health care provider.  Follow these instructions at home:  Treatment plan  You will monitor your blood glucose levels and may give yourself insulin. Your treatment plan will be reviewed at every medical visit. You and your health care provider will discuss:  · How you are taking your medicines, including insulin.  · Any side effects you have.  · Your blood glucose level target goals.  · How often you monitor your blood glucose level.  · Lifestyle habits, such as activity level and tobacco, alcohol, and substance use.  Education  Your health care provider will assess how well you are monitoring your blood glucose levels and whether you are taking your insulin and medicines correctly. He or she may refer you to:  · A certified diabetes care and  to manage your diabetes throughout your life, starting at diagnosis.  · A registered dietitian who can create and review your personal nutrition plan.  · An exercise specialist who can discuss your activity level and exercise plan.  General instructions  · Take over-the-counter and prescription medicines only as told by your health care provider.  · Keep all follow-up visits. This is important.  Where to find support  There are many diabetes support networks, including:  · American Diabetes Association (ADA): diabetes.org  · Defeat Diabetes Foundation: defeatdiabetes.org  Where to find more information  · American Diabetes Association (ADA): www.diabetes.org  · Association of Diabetes Care & Education Specialists (ADCES):  diabeteseducator.org  · International Diabetes Federation (IDF): idf.org  Summary  · Managing diabetes (diabetes mellitus) can be complicated. Your diabetes treatment may be managed by a team of health care providers.  · Your health care providers follow guidelines to help you get the best quality care.  · You should have physical exams, blood tests, blood pressure monitoring, immunizations, and screening tests regularly. Stay updated on how to manage your diabetes.  · Your health care providers may also give you more specific instructions based on your individual health.  This information is not intended to replace advice given to you by your health care provider. Make sure you discuss any questions you have with your health care provider.  Document Revised: 06/24/2021 Document Reviewed: 06/24/2021  [employer] Patient Education © 2021 [employer]      Type 2 Diabetes Mellitus, Self-Care, Adult  Caring for yourself after you have been diagnosed with type 2 diabetes (type 2 diabetes mellitus) means keeping your blood sugar (glucose) under control with a balance of:  · Nutrition.  · Exercise.  · Lifestyle changes.  · Medicines or insulin, if needed.  · Support from your team of health care providers and others.  The following information explains what you need to know to manage your diabetes at home.  What are the risks?  Having diabetes can put you at risk for other long-term (chronic) conditions, such as heart disease and kidney disease. Your health care provider may prescribe medicines to help prevent complications from diabetes.  How to monitor blood glucose    · Check your blood glucose every day, as often as told by your health care provider.  · Have your A1C (hemoglobin A1C) level checked two or more times a year, or as often as told by your health care provider.  · Your health care provider will set personalized treatment goals for you. Generally, the goal of treatment is to maintain the following blood  glucose levels:  ? Before meals:  mg/dL (4.4-7.2 mmol/L).  ? After meals: below 180 mg/dL (10 mmol/L).  ? A1C level: less than 7%.  How to manage hyperglycemia and hypoglycemia  Hyperglycemia symptoms  Hyperglycemia, also called high blood glucose, occurs when blood glucose is too high. Make sure you know the early signs of hyperglycemia, such as:  · Increased thirst.  · Hunger.  · Feeling very tired.  · Needing to urinate more often than usual.  · Blurry vision.  Hypoglycemia symptoms  Hypoglycemia, also called low blood glucose, occurs with a blood glucose level at or below 70 mg/dL (3.9 mmol/L). Diabetes medicines lower your blood glucose and can cause hypoglycemia. The risk for hypoglycemia increases during or after exercise, during sleep, during illness, and when skipping meals or not eating for a long time (fasting).  It is important to know the symptoms of hypoglycemia and treat it right away. Always have a 15-gram rapid-acting carbohydrate snack with you to treat low blood glucose. Family members and close friends should also know the symptoms and understand how to treat hypoglycemia, in case you are not able to treat yourself. Symptoms may include:  · Hunger.  · Anxiety.  · Sweating and feeling clammy.  · Dizziness or feeling light-headed.  · Sleepiness.  · Increased heart rate.  · Irritability.  · Tingling or numbness around the mouth, lips, or tongue.  · Restless sleep.  Severe hypoglycemia is when your blood glucose level is at or below 54 mg/dL (3 mmol/L). Severe hypoglycemia is an emergency. Do not wait to see if the symptoms will go away. Get medical help right away. Call your local emergency services (911 in the [place] Do not drive yourself to the hospital.  If you have severe hypoglycemia and you cannot eat or drink, you may need glucagon. A family member or close friend should learn how to check your blood glucose and how to give you glucagon. Ask your health care provider if you need to have  an emergency glucagon kit available.  Follow these instructions at home:  Medicines  · Take diabetes medicines as told by your health care provider. If your health care provider prescribed insulin or diabetes medicines, take them every day.  · Do not run out of insulin or other diabetes medicines. Plan ahead so you always have these available.  · If you use insulin, adjust your dosage based on your physical activity and what foods you eat. Your health care provider will tell you how to adjust your dosage.  · Take over-the-counter and prescription medicines only as told by your health care provider.  Eating and drinking    What you eat and drink affects your blood glucose and your insulin dosage. Making good choices helps to control your diabetes and prevent other health problems. A healthy meal plan includes eating lean proteins, complex carbohydrates, fresh fruits and vegetables, low-fat dairy products, and healthy fats.  Make an appointment to see a registered dietitian to help you create an eating plan that is right for you. Make sure that you:  · Follow instructions from your health care provider about eating or drinking restrictions.  · Drink enough fluid to keep your urine pale yellow.  · Keep a record of the carbohydrates that you eat. Do this by reading food labels and learning the standard serving sizes of foods.  · Follow your sick-day plan whenever you cannot eat or drink as usual. Make this plan in advance with your health care provider.    Activity  · Stay active. Exercise regularly, as told by your health care provider. This may include:  ? Stretching and doing strength exercises, such as yoga or weight lifting, 2 or more times a week.  ? Doing 150 minutes or more of moderate-intensity or vigorous-intensity exercise each week. This could be brisk walking, biking, or water aerobics.  § Spread out your activity over 3 or more days of the week.  § Do not go more than 2 days in a row without doing some  kind of physical activity.  · When you start a new exercise or activity, work with your health care provider to adjust your insulin, medicines, or food intake as needed.  Lifestyle  · Do not use any products that contain nicotine or tobacco, such as cigarettes, e-cigarettes, and chewing tobacco. If you need help quitting, ask your health care provider.  · If your health care provider says that alcohol is safe for you, limit how much you use to no more than 1 drink a day for women who are not pregnant and 2 drinks a day for men. In the [place] one drink equals one 12 oz bottle of beer (355 mL), one 5 oz glass of wine (148 mL), or one 1½ oz glass of hard liquor (44 mL).  · Learn to manage stress. If you need help with this, ask your health care provider.  Take care of your body    · Keep your immunizations up to date. In addition to getting vaccinations as told by your health care provider, it is recommended that you get vaccinated against the following illnesses:  ? The flu (influenza). Get a flu shot every year.  ? Pneumonia.  ? Hepatitis B.  · Schedule an eye exam soon after your diagnosis, and then one time every year after that.  · Check your skin and feet every day for cuts, bruises, redness, blisters, or sores. Schedule a foot exam with your health care provider once every year.  · Brush your teeth and gums two times a day, and floss one or more times a day. Visit your dentist one or more times every 6 months.  · Maintain a healthy weight.    General instructions  · Share your diabetes management plan with people in your workplace, school, and household.  · Carry a medical alert card or wear medical alert jewelry.  · Keep all follow-up visits as told by your health care provider. This is important.  Questions to ask your health care provider  · Should I meet with a certified diabetes care and ?  · Where can I find a support group for people with diabetes?  Where to find more  information  · American Diabetes Association (ADA): www.diabetes.org  · American Association of Diabetes Care and Education Specialists (ADCES): www.diabeteseducator.org  · International Diabetes Federation (IDF): www.idf.org  Summary  · Caring for yourself after you have been diagnosed with type 2 diabetes (type 2 diabetes mellitus) means keeping your blood sugar (glucose) under control with a balance of nutrition, exercise, lifestyle changes, and medicine.  · Check your blood glucose every day, as often as told by your health care provider.  · Having diabetes can put you at risk for other long-term (chronic) conditions, such as heart disease and kidney disease. Your health care provider may prescribe medicines to help prevent complications from diabetes.  · Share your diabetes management plan with people in your workplace, school, and household.  · Keep all follow-up visits as told by your health care provider. This is important.  This information is not intended to replace advice given to you by your health care provider. Make sure you discuss any questions you have with your health care provider.  Document Revised: 01/25/2021 Document Reviewed: 01/26/2021  Elsevier Patient Education © 2021 [employer]      Cholesterol Content in Foods  Cholesterol is a waxy, fat-like substance that helps to carry fat in the blood. The body needs cholesterol in small amounts, but too much cholesterol can cause damage to the arteries and heart.  Most people should eat less than 200 milligrams (mg) of cholesterol a day.  Foods with cholesterol    Cholesterol is found in animal-based foods, such as meat, seafood, and dairy. Generally, low-fat dairy and lean meats have less cholesterol than full-fat dairy and fatty meats. The milligrams of cholesterol per serving (mg per serving) of common cholesterol-containing foods are listed below.  Meat and other proteins  · Egg -- one large whole egg has 186 mg.  · Veal shank -- 4 oz has 141  mg.  · Lean ground turkey (93% lean) -- 4 oz has 118 mg.  · Fat-trimmed lamb loin -- 4 oz has 106 mg.  · Lean ground beef (90% lean) -- 4 oz has 100 mg.  · Lobster -- 3.5 oz has 90 mg.  · Pork loin chops -- 4 oz has 86 mg.  · Canned salmon -- 3.5 oz has 83 mg.  · Fat-trimmed beef top loin -- 4 oz has 78 mg.  · Frankfurter -- 1 laura (3.5 oz) has 77 mg.  · Crab -- 3.5 oz has 71 mg.  · Roasted chicken without skin, white meat -- 4 oz has 66 mg.  · Light bologna -- 2 oz has 45 mg.  · Deli-cut turkey -- 2 oz has 31 mg.  · Canned tuna -- 3.5 oz has 31 mg.  · [place] -- 1 oz has 29 mg.  · Oysters and mussels (raw) -- 3.5 oz has 25 mg.  · Mackerel -- 1 oz has 22 mg.  · Trout -- 1 oz has 20 mg.  · Pork sausage -- 1 link (1 oz) has 17 mg.  · [place] -- 1 oz has 16 mg.  · Tilapia -- 1 oz has 14 mg.  Dairy  · Soft-serve ice cream -- ½ cup (4 oz) has 103 mg.  · Whole-milk yogurt -- 1 cup (8 oz) has 29 mg.  · Cheddar cheese -- 1 oz has 28 mg.  · American cheese -- 1 oz has 28 mg.  · Whole milk -- 1 cup (8 oz) has 23 mg.  · 2% milk -- 1 cup (8 oz) has 18 mg.  · Cream cheese -- 1 tablespoon (Tbsp) has 15 mg.  · Cottage cheese -- ½ cup (4 oz) has 14 mg.  · Low-fat (1%) milk -- 1 cup (8 oz) has 10 mg.  · Sour cream -- 1 Tbsp has 8.5 mg.  · Low-fat yogurt -- 1 cup (8 oz) has 8 mg.  · Nonfat Greek yogurt -- 1 cup (8 oz) has 7 mg.  · Half-and-half cream -- 1 Tbsp has 5 mg.  Fats and oils  · Cod liver oil -- 1 tablespoon (Tbsp) has 82 mg.  · Butter -- 1 Tbsp has 15 mg.  · Lard -- 1 Tbsp has 14 mg.  · [place] grease -- 1 Tbsp has 14 mg.  · Mayonnaise -- 1 Tbsp has 5-10 mg.  · Margarine -- 1 Tbsp has 3-10 mg.  Exact amounts of cholesterol in these foods may vary depending on specific ingredients and brands.  Foods without cholesterol  Most plant-based foods do not have cholesterol unless you combine them with a food that has cholesterol. Foods without cholesterol include:  · Grains and cereals.  · Vegetables.  · Fruits.  · Vegetable oils, such  as olive, canola, and sunflower oil.  · Legumes, such as peas, beans, and lentils.  · Nuts and seeds.  · Egg whites.  Summary  · The body needs cholesterol in small amounts, but too much cholesterol can cause damage to the arteries and heart.  · Most people should eat less than 200 milligrams (mg) of cholesterol a day.  This information is not intended to replace advice given to you by your health care provider. Make sure you discuss any questions you have with your health care provider.  Document Revised: 05/10/2021 Document Reviewed: 05/10/2021  [employer] Patient Education © 2021 [employer]      Diabetes Mellitus and Nutrition, Adult  When you have diabetes, or diabetes mellitus, it is very important to have healthy eating habits because your blood sugar (glucose) levels are greatly affected by what you eat and drink. Eating healthy foods in the right amounts, at about the same times every day, can help you:  · Control your blood glucose.  · Lower your risk of heart disease.  · Improve your blood pressure.  · Reach or maintain a healthy weight.  What can affect my meal plan?  Every person with diabetes is different, and each person has different needs for a meal plan. Your health care provider may recommend that you work with a dietitian to make a meal plan that is best for you. Your meal plan may vary depending on factors such as:  · The calories you need.  · The medicines you take.  · Your weight.  · Your blood glucose, blood pressure, and cholesterol levels.  · Your activity level.  · Other health conditions you have, such as heart or kidney disease.  How do carbohydrates affect me?  Carbohydrates, also called carbs, affect your blood glucose level more than any other type of food. Eating carbs naturally raises the amount of glucose in your blood. Carb counting is a method for keeping track of how many carbs you eat. Counting carbs is important to keep your blood glucose at a healthy level, especially if you  "use insulin or take certain oral diabetes medicines.  It is important to know how many carbs you can safely have in each meal. This is different for every person. Your dietitian can help you calculate how many carbs you should have at each meal and for each snack.  How does alcohol affect me?  Alcohol can cause a sudden decrease in blood glucose (hypoglycemia), especially if you use insulin or take certain oral diabetes medicines. Hypoglycemia can be a life-threatening condition. Symptoms of hypoglycemia, such as sleepiness, dizziness, and confusion, are similar to symptoms of having too much alcohol.  · Do not drink alcohol if:  ? Your health care provider tells you not to drink.  ? You are pregnant, may be pregnant, or are planning to become pregnant.  · If you drink alcohol:  ? Do not drink on an empty stomach.  ? Limit how much you use to:  § 0-1 drink a day for women.  § 0-2 drinks a day for men.  ? Be aware of how much alcohol is in your drink. In the [place] one drink equals one 12 oz bottle of beer (355 mL), one 5 oz glass of wine (148 mL), or one 1½ oz glass of hard liquor (44 mL).  ? Keep yourself hydrated with water, diet soda, or unsweetened iced tea.  § Keep in mind that regular soda, juice, and other mixers may contain a lot of sugar and must be counted as carbs.  What are tips for following this plan?    Reading food labels  · Start by checking the serving size on the \"Nutrition Facts\" label of packaged foods and drinks. The amount of calories, carbs, fats, and other nutrients listed on the label is based on one serving of the item. Many items contain more than one serving per package.  · Check the total grams (g) of carbs in one serving. You can calculate the number of servings of carbs in one serving by dividing the total carbs by 15. For example, if a food has 30 g of total carbs per serving, it would be equal to 2 servings of carbs.  · Check the number of grams (g) of saturated fats and trans fats " "in one serving. Choose foods that have a low amount or none of these fats.  · Check the number of milligrams (mg) of salt (sodium) in one serving. Most people should limit total sodium intake to less than 2,300 mg per day.  · Always check the nutrition information of foods labeled as \"low-fat\" or \"nonfat.\" These foods may be higher in added sugar or refined carbs and should be avoided.  · Talk to your dietitian to identify your daily goals for nutrients listed on the label.  Shopping  · Avoid buying canned, pre-made, or processed foods. These foods tend to be high in fat, sodium, and added sugar.  · Shop around the outside edge of the grocery store. This is where you will most often find fresh fruits and vegetables, bulk grains, fresh meats, and fresh dairy.  Cooking  · Use low-heat cooking methods, such as baking, instead of high-heat cooking methods like deep frying.  · Cook using healthy oils, such as olive, canola, or sunflower oil.  · Avoid cooking with butter, cream, or high-fat meats.  Meal planning  · Eat meals and snacks regularly, preferably at the same times every day. Avoid going long periods of time without eating.  · Eat foods that are high in fiber, such as fresh fruits, vegetables, beans, and whole grains. Talk with your dietitian about how many servings of carbs you can eat at each meal.  · Eat 4-6 oz (112-168 g) of lean protein each day, such as lean meat, chicken, fish, eggs, or tofu. One ounce (oz) of lean protein is equal to:  ? 1 oz (28 g) of meat, chicken, or fish.  ? 1 egg.  ? ¼ cup (62 g) of tofu.  · Eat some foods each day that contain healthy fats, such as avocado, nuts, seeds, and fish.  What foods should I eat?  Fruits  Berries. Apples. Oranges. Peaches. Apricots. Plums. Grapes. Krz[place]ztof. Papaya. Pomegranate. Kiwi. Cherries.  Vegetables  Lettuce. Spinach. Leafy greens, including kale, chard, vitaliy greens, and mustard greens. Beets. Cauliflower. Cabbage. Broccoli. Carrots. Green beans. " Tomatoes. Peppers. Onions. Cucumbers. [place] sprouts.  Grains  Whole grains, such as whole-wheat or whole-grain bread, crackers, tortillas, cereal, and pasta. Unsweetened oatmeal. Quinoa. Brown or wild rice.  Meats and other proteins  Seafood. Poultry without skin. Lean cuts of poultry and beef. Tofu. Nuts. Seeds.  Dairy  Low-fat or fat-free dairy products such as milk, yogurt, and cheese.  The items listed above may not be a complete list of foods and beverages you can eat. Contact a dietitian for more information.  What foods should I avoid?  Fruits  Fruits canned with syrup.  Vegetables  Canned vegetables. Frozen vegetables with butter or cream sauce.  Grains  Refined white flour and flour products such as bread, pasta, snack foods, and cereals. Avoid all processed foods.  Meats and other proteins  Fatty cuts of meat. Poultry with skin. Breaded or fried meats. Processed meat. Avoid saturated fats.  Dairy  Full-fat yogurt, cheese, or milk.  Beverages  Sweetened drinks, such as soda or iced tea.  The items listed above may not be a complete list of foods and beverages you should avoid. Contact a dietitian for more information.  Questions to ask a health care provider  · Do I need to meet with a diabetes educator?  · Do I need to meet with a dietitian?  · What number can I call if I have questions?  · When are the best times to check my blood glucose?  Where to find more information:  · American Diabetes Association: diabetes.org  · Academy of Nutrition and Dietetics: www.eatright.org  · National [place] of Diabetes and Digestive and Kidney Diseases: www.niddk.nih.gov  · Association of Diabetes Care and Education Specialists: www.diabeteseducator.org  Summary  · It is important to have healthy eating habits because your blood sugar (glucose) levels are greatly affected by what you eat and drink.  · A healthy meal plan will help you control your blood glucose and maintain a healthy lifestyle.  · Your health care  provider may recommend that you work with a dietitian to make a meal plan that is best for you.  · Keep in mind that carbohydrates (carbs) and alcohol have immediate effects on your blood glucose levels. It is important to count carbs and to use alcohol carefully.  This information is not intended to replace advice given to you by your health care provider. Make sure you discuss any questions you have with your health care provider.  Document Revised: 11/24/2020 Document Reviewed: 11/24/2020  Elsevier Patient Education © 2021 [employer] Inc.

## 2022-01-17 NOTE — ASSESSMENT & PLAN NOTE
*controlled  Continue synthroid, no symptoms weight gain or lost, nodules or other  Last TSH was above 7, adjusted medicine and will recheck before next fu in a month or so  recommend repeat with next routine labs

## 2022-01-17 NOTE — PROGRESS NOTES
Transitional Care Follow Up Visit  Subjective     Ifrah Robertson is a 61 y.o. female who presents for a transitional care management visit.    Within 48 business hours after discharge our office contacted her via telephone to coordinate her care and needs.   adf     I reviewed and discussed the details of that call along with the discharge summary, hospital problems, inpatient lab results, inpatient diagnostic studies, and consultation reports with Ifrah.     Current outpatient and discharge medications have been reconciled for the patient.  Reviewed by: Rashid Acosta DO      No flowsheet data found.  Risk for Readmission (LACE) No data recorded    History of Present Illness   Course During Hospital Stay:  ***     {Common H&P Review Areas:94839}      Objective   Physical Exam      Lab Frequency Next Occurrence   Lipid panel Once 01/24/2022   Hemoglobin A1c Once 01/24/2022   Microalbumin / Creatinine Urine Ratio - Urine, Clean Catch Once 01/24/2022   Comprehensive Metabolic Panel Once 01/24/2022   CBC (No Diff) Once 01/24/2022   Vitamin D 25 hydroxy Once 01/23/2022   PTH, Intact Once 01/23/2022   CBC w AUTO Differential Once 01/23/2022   Iron Profile Once 01/23/2022   Ferritin Once 01/23/2022   TSH+Free T4 Once 01/23/2022   Vitamin B12 Once 01/23/2022   Mammo Screening Bilateral With CAD Once 01/08/2022       Recent Results (from the past 4032 hour(s))   Comprehensive Metabolic Panel    Collection Time: 10/18/21  4:11 PM    Specimen: Blood   Result Value Ref Range    Glucose 245 (H) 65 - 99 mg/dL    BUN 7 (L) 8 - 23 mg/dL    Creatinine 0.72 0.57 - 1.00 mg/dL    Sodium 137 136 - 145 mmol/L    Potassium 4.9 3.5 - 5.2 mmol/L    Chloride 97 (L) 98 - 107 mmol/L    CO2 28.8 22.0 - 29.0 mmol/L    Calcium 8.7 8.6 - 10.5 mg/dL    Total Protein 7.0 6.0 - 8.5 g/dL    Albumin 4.10 3.50 - 5.20 g/dL    ALT (SGPT) 11 1 - 33 U/L    AST (SGOT) 14 1 - 32 U/L    Alkaline Phosphatase 72 39 - 117 U/L    Total Bilirubin 0.3 0.0 - 1.2  mg/dL    eGFR Non African Amer 83 >60 mL/min/1.73    Globulin 2.9 gm/dL    A/G Ratio 1.4 g/dL    BUN/Creatinine Ratio 9.7 7.0 - 25.0    Anion Gap 11.2 5.0 - 15.0 mmol/L   Lipid Panel    Collection Time: 10/18/21  4:11 PM    Specimen: Blood   Result Value Ref Range    Total Cholesterol 263 (H) 0 - 200 mg/dL    Triglycerides 254 (H) 0 - 150 mg/dL    HDL Cholesterol 38 (L) 40 - 60 mg/dL    LDL Cholesterol  176 (H) 0 - 100 mg/dL    VLDL Cholesterol 49 (H) 5 - 40 mg/dL    LDL/HDL Ratio 4.58    Hemoglobin A1c    Collection Time: 10/18/21  4:11 PM    Specimen: Blood   Result Value Ref Range    Hemoglobin A1C 11.13 (H) 4.80 - 5.60 %   Vitamin B12 & Folate    Collection Time: 10/18/21  4:11 PM    Specimen: Blood   Result Value Ref Range    Folate 7.70 4.78 - 24.20 ng/mL    Vitamin B-12 369 211 - 946 pg/mL   CBC (No Diff)    Collection Time: 10/18/21  4:11 PM    Specimen: Blood   Result Value Ref Range    WBC 12.54 (H) 3.40 - 10.80 10*3/mm3    RBC 5.01 3.77 - 5.28 10*6/mm3    Hemoglobin 14.1 12.0 - 15.9 g/dL    Hematocrit 45.2 34.0 - 46.6 %    MCV 90.2 79.0 - 97.0 fL    MCH 28.1 26.6 - 33.0 pg    MCHC 31.2 (L) 31.5 - 35.7 g/dL    RDW 13.3 12.3 - 15.4 %    RDW-SD 44.0 37.0 - 54.0 fl    MPV 10.8 6.0 - 12.0 fL    Platelets 345 140 - 450 10*3/mm3   Iron Profile    Collection Time: 10/18/21  4:11 PM    Specimen: Blood   Result Value Ref Range    Iron 53 37 - 145 mcg/dL    Iron Saturation 13 (L) 20 - 50 %    Transferrin 264 200 - 360 mg/dL    TIBC 393 298 - 536 mcg/dL   TSH+Free T4    Collection Time: 10/18/21  4:11 PM    Specimen: Blood   Result Value Ref Range    TSH 7.810 (H) 0.270 - 4.200 uIU/mL    Free T4 1.12 0.93 - 1.70 ng/dL            Assessment/Plan   Problems Addressed this Visit        Active Problems    Morbid obesity with body mass index (BMI) of 50.0 to 59.9 in adult ([facility]) (Chronic)    Type 2 diabetes mellitus with hyperglycemia, with long-term current use of insulin ([facility]) - Primary (Chronic)    Relevant Medications     Dulaglutide (Trulicity) 1.5 MG/0.5ML solution pen-injector    Abnormal uterine bleeding    Relevant Orders    Ambulatory Referral to Gynecology    Screening mammogram for breast cancer    Relevant Orders    Mammo Screening Bilateral With CAD      Diagnoses       Codes Comments    Type 2 diabetes mellitus with hyperglycemia, with long-term current use of insulin (HCC)    -  Primary ICD-10-CM: E11.65, Z79.4  ICD-9-CM: 250.00, 790.29, V58.67     Morbid obesity with body mass index (BMI) of 50.0 to 59.9 in adult (HCC)     ICD-10-CM: E66.01, Z68.43  ICD-9-CM: 278.01, V85.43     Abnormal uterine bleeding     ICD-10-CM: N93.9  ICD-9-CM: 626.9     Screening mammogram for breast cancer     ICD-10-CM: Z12.31  ICD-9-CM: V76.12                    Return in about 6 weeks (around 2/14/2022), or if symptoms worsen or fail to improve, for Labs before, Medicare Wellness.      Additional Instructions for the Follow-ups that You Need to Schedule     Mammo Screening Bilateral With CAD   Jan 08, 2022      Exam reason: screening mammogram    Does this patient have a diabetic monitoring/medication delivering device on?: No    Release to patient: Immediate              Your Scheduled Appointments    Feb 15, 2022  2:30 PM  Office Visit with Rashid Acosta DO  [facility] ([place]) [address] [place], [place] [address]ILA [address]APER  [facility] [id number] [phone]   Arrive 15 minutes prior to appointment.  If you are in need of a language or hearing  please call the Department.     Mar 02, 2022  9:45 AM  New Gynecological with Kim Meeks MD  [facility] ([place]) [address] [place] [address] [address] [place] [address] [phone]

## 2022-01-17 NOTE — ASSESSMENT & PLAN NOTE
*controlled  On SSRI lexapro, continue no changes in mood or other including no SI/HI  Follow up every 3-6 months or sooner if worse symptoms or concerns

## 2022-01-17 NOTE — ASSESSMENT & PLAN NOTE
Abnormal bleeding, postmenopausal, started a few months ago she states, she is very concerned could be cancer or other, advised we would refer to GYN or she could call and try to get in sooner if any she had seen before  Counseled on evaluation and screening, glad she came in

## 2022-01-17 NOTE — ASSESSMENT & PLAN NOTE
*managing, uncontrolled 10/2021 with a1c >11  a1c goal <7-7.5 eventually, approaching at least <9 for now  Continue on trulicity, titrate up to 4.5mg every few weeks if able and consider adding another medicine also to meet goals or will consider having her see another specialist  Continue medicines as prescribed  Recheck a1c in the next month if improving   fasting glucose around 120-150  Up to date on vax  Foot and eye exam recommended annually, AWV at follow up If indicated

## 2022-02-18 ENCOUNTER — LAB (OUTPATIENT)
Dept: LAB | Facility: HOSPITAL | Age: 62
End: 2022-02-18

## 2022-02-18 DIAGNOSIS — E03.9 HYPOTHYROIDISM, UNSPECIFIED TYPE: Chronic | ICD-10-CM

## 2022-02-18 DIAGNOSIS — D50.8 IRON DEFICIENCY ANEMIA SECONDARY TO INADEQUATE DIETARY IRON INTAKE: ICD-10-CM

## 2022-02-18 DIAGNOSIS — E78.2 DYSLIPIDEMIA WITH LOW HIGH DENSITY LIPOPROTEIN (HDL) CHOLESTEROL WITH HYPERTRIGLYCERIDEMIA DUE TO TYPE 2 DIABETES MELLITUS: Chronic | ICD-10-CM

## 2022-02-18 DIAGNOSIS — Z79.4 TYPE 2 DIABETES MELLITUS WITH HYPERGLYCEMIA, WITH LONG-TERM CURRENT USE OF INSULIN: Chronic | ICD-10-CM

## 2022-02-18 DIAGNOSIS — E55.9 VITAMIN D DEFICIENCY DISEASE: Chronic | ICD-10-CM

## 2022-02-18 DIAGNOSIS — E11.69 DYSLIPIDEMIA WITH LOW HIGH DENSITY LIPOPROTEIN (HDL) CHOLESTEROL WITH HYPERTRIGLYCERIDEMIA DUE TO TYPE 2 DIABETES MELLITUS: Chronic | ICD-10-CM

## 2022-02-18 DIAGNOSIS — F51.01 PRIMARY INSOMNIA: ICD-10-CM

## 2022-02-18 DIAGNOSIS — E11.65 TYPE 2 DIABETES MELLITUS WITH HYPERGLYCEMIA, WITH LONG-TERM CURRENT USE OF INSULIN: Chronic | ICD-10-CM

## 2022-02-18 LAB
ALBUMIN SERPL-MCNC: 4 G/DL (ref 3.5–5.2)
ALBUMIN/GLOB SERPL: 1.5 G/DL
ALP SERPL-CCNC: 78 U/L (ref 39–117)
ALT SERPL W P-5'-P-CCNC: 12 U/L (ref 1–33)
ANION GAP SERPL CALCULATED.3IONS-SCNC: 11.2 MMOL/L (ref 5–15)
AST SERPL-CCNC: 14 U/L (ref 1–32)
BASOPHILS # BLD AUTO: 0.07 10*3/MM3 (ref 0–0.2)
BASOPHILS NFR BLD AUTO: 0.6 % (ref 0–1.5)
BILIRUB SERPL-MCNC: 0.3 MG/DL (ref 0–1.2)
BUN SERPL-MCNC: 9 MG/DL (ref 8–23)
BUN/CREAT SERPL: 12.7 (ref 7–25)
CALCIUM SPEC-SCNC: 8.6 MG/DL (ref 8.6–10.5)
CHLORIDE SERPL-SCNC: 96 MMOL/L (ref 98–107)
CHOLEST SERPL-MCNC: 224 MG/DL (ref 0–200)
CO2 SERPL-SCNC: 27.8 MMOL/L (ref 22–29)
CREAT SERPL-MCNC: 0.71 MG/DL (ref 0.57–1)
DEPRECATED RDW RBC AUTO: 42.3 FL (ref 37–54)
EOSINOPHIL # BLD AUTO: 0.4 10*3/MM3 (ref 0–0.4)
EOSINOPHIL NFR BLD AUTO: 3.7 % (ref 0.3–6.2)
ERYTHROCYTE [DISTWIDTH] IN BLOOD BY AUTOMATED COUNT: 13.8 % (ref 12.3–15.4)
GFR SERPL CREATININE-BSD FRML MDRD: 84 ML/MIN/1.73
GLOBULIN UR ELPH-MCNC: 2.7 GM/DL
GLUCOSE SERPL-MCNC: 352 MG/DL (ref 65–99)
HBA1C MFR BLD: 10 % (ref 4.8–5.6)
HCT VFR BLD AUTO: 36.8 % (ref 34–46.6)
HDLC SERPL-MCNC: 38 MG/DL (ref 40–60)
HGB BLD-MCNC: 11.8 G/DL (ref 12–15.9)
IMM GRANULOCYTES # BLD AUTO: 0.08 10*3/MM3 (ref 0–0.05)
IMM GRANULOCYTES NFR BLD AUTO: 0.7 % (ref 0–0.5)
IRON 24H UR-MRATE: 32 MCG/DL (ref 37–145)
IRON SATN MFR SERPL: 8 % (ref 20–50)
LDLC SERPL CALC-MCNC: 141 MG/DL (ref 0–100)
LDLC/HDLC SERPL: 3.59 {RATIO}
LYMPHOCYTES # BLD AUTO: 1.97 10*3/MM3 (ref 0.7–3.1)
LYMPHOCYTES NFR BLD AUTO: 18.2 % (ref 19.6–45.3)
MCH RBC QN AUTO: 27.4 PG (ref 26.6–33)
MCHC RBC AUTO-ENTMCNC: 32.1 G/DL (ref 31.5–35.7)
MCV RBC AUTO: 85.6 FL (ref 79–97)
MONOCYTES # BLD AUTO: 0.52 10*3/MM3 (ref 0.1–0.9)
MONOCYTES NFR BLD AUTO: 4.8 % (ref 5–12)
NEUTROPHILS NFR BLD AUTO: 7.81 10*3/MM3 (ref 1.7–7)
NEUTROPHILS NFR BLD AUTO: 72 % (ref 42.7–76)
NRBC BLD AUTO-RTO: 0 /100 WBC (ref 0–0.2)
PLATELET # BLD AUTO: 358 10*3/MM3 (ref 140–450)
PMV BLD AUTO: 10.6 FL (ref 6–12)
POTASSIUM SERPL-SCNC: 4.6 MMOL/L (ref 3.5–5.2)
PROT SERPL-MCNC: 6.7 G/DL (ref 6–8.5)
RBC # BLD AUTO: 4.3 10*6/MM3 (ref 3.77–5.28)
SODIUM SERPL-SCNC: 135 MMOL/L (ref 136–145)
TIBC SERPL-MCNC: 425 MCG/DL (ref 298–536)
TRANSFERRIN SERPL-MCNC: 285 MG/DL (ref 200–360)
TRIGL SERPL-MCNC: 248 MG/DL (ref 0–150)
VLDLC SERPL-MCNC: 45 MG/DL (ref 5–40)
WBC NRBC COR # BLD: 10.85 10*3/MM3 (ref 3.4–10.8)

## 2022-02-18 PROCEDURE — 84439 ASSAY OF FREE THYROXINE: CPT

## 2022-02-18 PROCEDURE — 83970 ASSAY OF PARATHORMONE: CPT

## 2022-02-18 PROCEDURE — 36415 COLL VENOUS BLD VENIPUNCTURE: CPT

## 2022-02-18 PROCEDURE — 82570 ASSAY OF URINE CREATININE: CPT

## 2022-02-18 PROCEDURE — 84466 ASSAY OF TRANSFERRIN: CPT

## 2022-02-18 PROCEDURE — 83036 HEMOGLOBIN GLYCOSYLATED A1C: CPT

## 2022-02-18 PROCEDURE — 82607 VITAMIN B-12: CPT

## 2022-02-18 PROCEDURE — 84443 ASSAY THYROID STIM HORMONE: CPT

## 2022-02-18 PROCEDURE — 82728 ASSAY OF FERRITIN: CPT

## 2022-02-18 PROCEDURE — 80053 COMPREHEN METABOLIC PANEL: CPT

## 2022-02-18 PROCEDURE — 82043 UR ALBUMIN QUANTITATIVE: CPT

## 2022-02-18 PROCEDURE — 80061 LIPID PANEL: CPT

## 2022-02-18 PROCEDURE — 85025 COMPLETE CBC W/AUTO DIFF WBC: CPT

## 2022-02-18 PROCEDURE — 82306 VITAMIN D 25 HYDROXY: CPT

## 2022-02-18 PROCEDURE — 83540 ASSAY OF IRON: CPT

## 2022-02-19 LAB
25(OH)D3 SERPL-MCNC: 5.5 NG/ML (ref 30–100)
ALBUMIN UR-MCNC: 16.3 MG/DL
CREAT UR-MCNC: 83.1 MG/DL
FERRITIN SERPL-MCNC: 44.1 NG/ML (ref 13–150)
MICROALBUMIN/CREAT UR: 196.1 MG/G
PTH-INTACT SERPL-MCNC: 30.1 PG/ML (ref 15–65)
T4 FREE SERPL-MCNC: 0.67 NG/DL (ref 0.93–1.7)
TSH SERPL DL<=0.05 MIU/L-ACNC: 16.3 UIU/ML (ref 0.27–4.2)
VIT B12 BLD-MCNC: 1299 PG/ML (ref 211–946)

## 2022-02-21 ENCOUNTER — OFFICE VISIT (OUTPATIENT)
Dept: FAMILY MEDICINE CLINIC | Facility: CLINIC | Age: 62
End: 2022-02-21

## 2022-02-21 VITALS
HEART RATE: 74 BPM | TEMPERATURE: 96.9 F | WEIGHT: 293 LBS | RESPIRATION RATE: 20 BRPM | HEIGHT: 68 IN | DIASTOLIC BLOOD PRESSURE: 71 MMHG | BODY MASS INDEX: 44.41 KG/M2 | SYSTOLIC BLOOD PRESSURE: 144 MMHG | OXYGEN SATURATION: 100 %

## 2022-02-21 DIAGNOSIS — E11.65 TYPE 2 DIABETES MELLITUS WITH HYPERGLYCEMIA, WITH LONG-TERM CURRENT USE OF INSULIN: ICD-10-CM

## 2022-02-21 DIAGNOSIS — D50.8 IRON DEFICIENCY ANEMIA SECONDARY TO INADEQUATE DIETARY IRON INTAKE: Primary | ICD-10-CM

## 2022-02-21 DIAGNOSIS — Z79.4 TYPE 2 DIABETES MELLITUS WITH HYPERGLYCEMIA, WITH LONG-TERM CURRENT USE OF INSULIN: ICD-10-CM

## 2022-02-21 DIAGNOSIS — E55.9 VITAMIN D DEFICIENCY DISEASE: Chronic | ICD-10-CM

## 2022-02-21 PROCEDURE — 99214 OFFICE O/P EST MOD 30 MIN: CPT | Performed by: FAMILY MEDICINE

## 2022-02-21 RX ORDER — LEVOCETIRIZINE DIHYDROCHLORIDE 5 MG/1
5 TABLET, FILM COATED ORAL EVERY EVENING
Qty: 30 TABLET | Refills: 11 | Status: SHIPPED | OUTPATIENT
Start: 2022-02-21 | End: 2022-03-11

## 2022-02-21 RX ORDER — ERGOCALCIFEROL 1.25 MG/1
50000 CAPSULE ORAL WEEKLY
Qty: 12 CAPSULE | Refills: 3 | Status: SHIPPED | OUTPATIENT
Start: 2022-02-21 | End: 2023-02-23

## 2022-02-21 RX ORDER — INSULIN GLARGINE AND LIXISENATIDE 100; 33 U/ML; UG/ML
INJECTION, SOLUTION SUBCUTANEOUS
Qty: 6 PEN | Refills: 5 | Status: SHIPPED | OUTPATIENT
Start: 2022-02-21 | End: 2022-03-10 | Stop reason: SDUPTHER

## 2022-02-21 RX ORDER — INSULIN DEGLUDEC 200 U/ML
INJECTION, SOLUTION SUBCUTANEOUS
Qty: 9 PEN | Refills: 3 | Status: SHIPPED | OUTPATIENT
Start: 2022-02-21 | End: 2022-03-10 | Stop reason: SDUPTHER

## 2022-02-21 NOTE — PROGRESS NOTES
"Chief Complaint  Diabetes (six week follow up, hasn't been checking her blood sugar )    Subjective          Ifrah Robertson presents to [facility]  Diabetes  She presents for her follow-up diabetic visit. She has type 2 diabetes mellitus. There are no hypoglycemic associated symptoms. Associated symptoms include visual change. There are no hypoglycemic complications. Symptoms are stable.       Patient presents with complaint of Diabetes (six week follow up, hasn't been checking her blood sugar ) as reviewed above in HPI with MA assistance and agree with collected history.     Patient additionally has history of low iron we reviewed her labs recommend a iron infusion to improve        Objective   Vital Signs:   /71 (BP Location: Left arm, Patient Position: Sitting)   Pulse 74   Temp 96.9 °F (36.1 °C) (Temporal)   Resp 20   Ht 172.7 cm (68\")   Wt (!) 149 kg (328 lb)   SpO2 100%   BMI 49.87 kg/m²     Physical Exam  Vitals reviewed.   Constitutional:       Appearance: Normal appearance. She is well-developed.   HENT:      Head: Normocephalic and atraumatic.      Right Ear: External ear normal.      Left Ear: External ear normal.      Mouth/Throat:      Pharynx: No oropharyngeal exudate.   Eyes:      Conjunctiva/sclera: Conjunctivae normal.      Pupils: Pupils are equal, round, and reactive to light.   Cardiovascular:      Rate and Rhythm: Normal rate.      Pulses:           Dorsalis pedis pulses are 2+ on the right side and 2+ on the left side.   Pulmonary:      Effort: Pulmonary effort is normal.   Abdominal:      General: Abdomen is flat. There is no distension.      Palpations: Abdomen is soft.   Musculoskeletal:      Right foot: Normal range of motion. No deformity.      Left foot: Normal range of motion. No deformity.   Feet:      Right foot:      Protective Sensation: 3 sites tested. 3 sites sensed.      Skin integrity: Skin integrity normal. No ulcer or blister. "      Toenail Condition: Right toenails are normal.      Left foot:      Protective Sensation: 3 sites tested. 3 sites sensed.      Skin integrity: Skin integrity normal. No ulcer or blister.      Toenail Condition: Left toenails are normal.      Comments: Diabetic Foot Exam Performed     Skin:     General: Skin is warm and dry.   Neurological:      General: No focal deficit present.      Mental Status: She is alert and oriented to person, place, and time.   Psychiatric:         Mood and Affect: Mood and affect normal.         Behavior: Behavior normal.         Thought Content: Thought content normal.         Judgment: Judgment normal.        Result Review :                 Assessment and Plan    Diagnoses and all orders for this visit:    1. Iron deficiency anemia secondary to inadequate dietary iron intake (Primary)  -     Ambulatory Referral to [facility] For Infusion Treatment    2. Vitamin D deficiency disease    3. Type 2 diabetes mellitus with hyperglycemia, with long-term current use of insulin (HCC)  -     Insulin Glargine-Lixisenatide (Soliqua) 100-33 UNT-MCG/ML solution pen-injector injection; Inject 48 units daily, max of 60 units for t2dm e11.9  Dispense: 6 pen; Refill: 5  -     Tresiba FlexTouch 200 UNIT/ML solution pen-injector pen injection; Inject 48 units daily, max 60 units for t2dm e11.9  Dispense: 9 pen; Refill: 3  -     Ambulatory Referral to Internal Medicine    Other orders  -     vitamin D (ERGOCALCIFEROL) 1.25 MG (18350 UT) capsule capsule; Take 1 capsule by mouth 1 (One) Time Per Week.  Dispense: 12 capsule; Refill: 3  -     levocetirizine (Xyzal) 5 MG tablet; Take 1 tablet by mouth Every Evening.  Dispense: 30 tablet; Refill: 11      Iron infusion for patient    Continue medicines for diabetes refer to Dr. Cameron    Added or changed to soliloqa    Vitamin D prescribed and Xyzal for allergies      Follow Up   Return in about 4 weeks (around 3/21/2022), or if symptoms worsen or fail to  improve.  Patient was given instructions and counseling regarding her condition or for health maintenance advice. Please see specific information pulled into the AVS if appropriate.

## 2022-02-28 RX ORDER — SODIUM CHLORIDE 9 MG/ML
250 INJECTION, SOLUTION INTRAVENOUS ONCE
Status: CANCELLED | OUTPATIENT
Start: 2022-03-01

## 2022-02-28 RX ORDER — ACETAMINOPHEN 325 MG/1
650 TABLET ORAL ONCE
Status: CANCELLED | OUTPATIENT
Start: 2022-03-01

## 2022-02-28 RX ORDER — FAMOTIDINE 20 MG/1
20 TABLET, FILM COATED ORAL ONCE
OUTPATIENT
Start: 2022-03-01

## 2022-02-28 RX ORDER — DIPHENHYDRAMINE HYDROCHLORIDE 50 MG/ML
25 INJECTION INTRAMUSCULAR; INTRAVENOUS ONCE
OUTPATIENT
Start: 2022-03-01

## 2022-02-28 RX ORDER — DIPHENHYDRAMINE HCL 25 MG
25 CAPSULE ORAL ONCE
Status: CANCELLED | OUTPATIENT
Start: 2022-03-01

## 2022-03-02 ENCOUNTER — TELEPHONE (OUTPATIENT)
Dept: OBSTETRICS AND GYNECOLOGY | Facility: CLINIC | Age: 62
End: 2022-03-02

## 2022-03-08 ENCOUNTER — HOSPITAL ENCOUNTER (OUTPATIENT)
Dept: INFUSION THERAPY | Facility: HOSPITAL | Age: 62
Discharge: HOME | End: 2022-03-08
Admitting: FAMILY MEDICINE

## 2022-03-08 VITALS
BODY MASS INDEX: 43.4 KG/M2 | OXYGEN SATURATION: 97 % | HEART RATE: 80 BPM | DIASTOLIC BLOOD PRESSURE: 59 MMHG | HEIGHT: 69 IN | SYSTOLIC BLOOD PRESSURE: 127 MMHG | TEMPERATURE: 97.7 F | RESPIRATION RATE: 20 BRPM | WEIGHT: 293 LBS

## 2022-03-08 DIAGNOSIS — D50.8 IRON DEFICIENCY ANEMIA SECONDARY TO INADEQUATE DIETARY IRON INTAKE: Primary | ICD-10-CM

## 2022-03-08 PROCEDURE — 96365 THER/PROPH/DIAG IV INF INIT: CPT

## 2022-03-08 PROCEDURE — 25010000002 NA FERRIC GLUC CPLX PER 12.5 MG: Performed by: FAMILY MEDICINE

## 2022-03-08 RX ORDER — DIPHENHYDRAMINE HYDROCHLORIDE 50 MG/ML
25 INJECTION INTRAMUSCULAR; INTRAVENOUS ONCE
OUTPATIENT
Start: 2022-03-09

## 2022-03-08 RX ORDER — DIPHENHYDRAMINE HCL 25 MG
25 CAPSULE ORAL ONCE
OUTPATIENT
Start: 2022-03-09

## 2022-03-08 RX ORDER — SODIUM CHLORIDE 9 MG/ML
250 INJECTION, SOLUTION INTRAVENOUS ONCE
Status: DISCONTINUED | OUTPATIENT
Start: 2022-03-08 | End: 2022-03-10 | Stop reason: HOSPADM

## 2022-03-08 RX ORDER — DIPHENHYDRAMINE HCL 25 MG
25 CAPSULE ORAL ONCE
Status: DISCONTINUED | OUTPATIENT
Start: 2022-03-08 | End: 2022-03-10 | Stop reason: HOSPADM

## 2022-03-08 RX ORDER — ACETAMINOPHEN 325 MG/1
650 TABLET ORAL ONCE
OUTPATIENT
Start: 2022-03-09

## 2022-03-08 RX ORDER — SODIUM CHLORIDE 9 MG/ML
250 INJECTION, SOLUTION INTRAVENOUS ONCE
OUTPATIENT
Start: 2022-03-09

## 2022-03-08 RX ORDER — FAMOTIDINE 20 MG/1
20 TABLET, FILM COATED ORAL ONCE
OUTPATIENT
Start: 2022-03-09

## 2022-03-08 RX ORDER — ACETAMINOPHEN 325 MG/1
650 TABLET ORAL ONCE
Status: DISCONTINUED | OUTPATIENT
Start: 2022-03-08 | End: 2022-03-10 | Stop reason: HOSPADM

## 2022-03-08 RX ADMIN — SODIUM CHLORIDE 250 MG: 9 INJECTION, SOLUTION INTRAVENOUS at 13:00

## 2022-03-09 ENCOUNTER — TELEPHONE (OUTPATIENT)
Dept: FAMILY MEDICINE CLINIC | Facility: CLINIC | Age: 62
End: 2022-03-09

## 2022-03-09 DIAGNOSIS — Z79.4 TYPE 2 DIABETES MELLITUS WITH HYPERGLYCEMIA, WITH LONG-TERM CURRENT USE OF INSULIN: Primary | ICD-10-CM

## 2022-03-09 DIAGNOSIS — E11.65 TYPE 2 DIABETES MELLITUS WITH HYPERGLYCEMIA, WITH LONG-TERM CURRENT USE OF INSULIN: Primary | ICD-10-CM

## 2022-03-09 NOTE — TELEPHONE ENCOUNTER
Caller: Ifrah Robertson    Relationship: Self    Best call back number: [phone]    What medications are you currently taking:   Current Outpatient Medications on File Prior to Visit   Medication Sig Dispense Refill   • escitalopram (LEXAPRO) 20 MG tablet Take 1 tablet by mouth Daily. 90 tablet 3   • ezetimibe (ZETIA) 10 MG tablet      • Insulin Glargine-Lixisenatide (Soliqua) 100-33 UNT-MCG/ML solution pen-injector injection Inject 48 units daily, max of 60 units for t2dm e11.9 6 pen 5   • Insulin Pen Needle (Pen Needles) 31G X 8 MM misc Use to inject insulin up to 5 times daily, T2DM e11.9 200 each 11   • levocetirizine (Xyzal) 5 MG tablet Take 1 tablet by mouth Every Evening. 30 tablet 11   • levothyroxine (SYNTHROID, LEVOTHROID) 200 MCG tablet Take 1 tablet by mouth Daily for 90 days. 90 tablet 3   • metoprolol succinate XL (TOPROL-XL) 50 MG 24 hr tablet Take 1 tablet by mouth Daily for 90 days. 90 tablet 3   • Tresiba FlexTouch 200 UNIT/ML solution pen-injector pen injection Inject 48 units daily, max 60 units for t2dm e11.9 9 pen 3   • vitamin B-12 (CYANOCOBALAMIN) 1000 MCG tablet Take 1,000 mcg by mouth Daily. Pt takes 2 tablets at night     • vitamin D (ERGOCALCIFEROL) 1.25 MG (33494 UT) capsule capsule Take 1 capsule by mouth 1 (One) Time Per Week. 12 capsule 3     Current Facility-Administered Medications on File Prior to Visit   Medication Dose Route Frequency Provider Last Rate Last Admin   • acetaminophen (TYLENOL) tablet 650 mg  650 mg Oral Once Rashid Bowie, DO       • diphenhydrAMINE (BENADRYL) capsule 25 mg  25 mg Oral Once Rashid Bowie, DO       • sodium chloride 0.9 % infusion 250 mL  250 mL Intravenous Once Rashid Bowie DO              When did you start taking these medications: 02/21/22    Which medication are you concerned about: Insulin Glargine-Lixisenatide (Soliqua) 100-33 UNT-MCG/ML solution pen-injector injection    Who prescribed you this medication: RASHID BOWIE    What  are your concerns: THE PATIENT STATED THIS MEDICATION IS NOT WORKING FOR HER. HER SUGAR IS STILL IN 'S AS OF 03/09/22. SHE WOULD LIKE A CALL BACK TO DISCUSS PCP RECOMMENDATION. IF AN ADDITIONAL OR ALTERNATE MEDICATION IS PRESCRIBED SHE WOULD LIKE THIS TO GO TO:    [facility] - [place], [place] - [address] - [phone]  - [phone]  [phone]      How long have you had these concerns: 03/09/22

## 2022-03-10 RX ORDER — INSULIN DEGLUDEC 200 U/ML
INJECTION, SOLUTION SUBCUTANEOUS
Qty: 9 PEN | Refills: 3 | Status: SHIPPED | OUTPATIENT
Start: 2022-03-10

## 2022-03-10 NOTE — TELEPHONE ENCOUNTER
shes going to follow up with dr. Cameron for diabetes    Renewed her tresiba and sent in rybelsus to replace soliqua injection, shes going to take novolog with meals as well like before

## 2022-03-11 ENCOUNTER — OFFICE VISIT (OUTPATIENT)
Dept: OBSTETRICS AND GYNECOLOGY | Facility: CLINIC | Age: 62
End: 2022-03-11

## 2022-03-11 VITALS
SYSTOLIC BLOOD PRESSURE: 141 MMHG | WEIGHT: 293 LBS | BODY MASS INDEX: 43.4 KG/M2 | HEART RATE: 75 BPM | HEIGHT: 69 IN | DIASTOLIC BLOOD PRESSURE: 77 MMHG

## 2022-03-11 DIAGNOSIS — Z12.4 PAPANICOLAOU SMEAR: ICD-10-CM

## 2022-03-11 DIAGNOSIS — Z12.31 ENCOUNTER FOR SCREENING MAMMOGRAM FOR BREAST CANCER: ICD-10-CM

## 2022-03-11 DIAGNOSIS — E11.65 TYPE 2 DIABETES MELLITUS WITH HYPERGLYCEMIA, WITH LONG-TERM CURRENT USE OF INSULIN: Chronic | ICD-10-CM

## 2022-03-11 DIAGNOSIS — Z86.73 HX OF COMPLETED STROKE: ICD-10-CM

## 2022-03-11 DIAGNOSIS — E03.9 HYPOTHYROIDISM (ACQUIRED): ICD-10-CM

## 2022-03-11 DIAGNOSIS — N95.0 POST-MENOPAUSAL BLEEDING: Primary | ICD-10-CM

## 2022-03-11 DIAGNOSIS — Z01.411 ENCOUNTER FOR GYNECOLOGICAL EXAMINATION (GENERAL) (ROUTINE) WITH ABNORMAL FINDINGS: ICD-10-CM

## 2022-03-11 DIAGNOSIS — Z79.4 TYPE 2 DIABETES MELLITUS WITH HYPERGLYCEMIA, WITH LONG-TERM CURRENT USE OF INSULIN: Chronic | ICD-10-CM

## 2022-03-11 PROCEDURE — 88305 TISSUE EXAM BY PATHOLOGIST: CPT | Performed by: OBSTETRICS & GYNECOLOGY

## 2022-03-11 PROCEDURE — 87624 HPV HI-RISK TYP POOLED RSLT: CPT | Performed by: OBSTETRICS & GYNECOLOGY

## 2022-03-11 PROCEDURE — 58100 BIOPSY OF UTERUS LINING: CPT | Performed by: OBSTETRICS & GYNECOLOGY

## 2022-03-11 PROCEDURE — G0123 SCREEN CERV/VAG THIN LAYER: HCPCS | Performed by: OBSTETRICS & GYNECOLOGY

## 2022-03-11 PROCEDURE — 99204 OFFICE O/P NEW MOD 45 MIN: CPT | Performed by: OBSTETRICS & GYNECOLOGY

## 2022-03-11 NOTE — ADDENDUM NOTE
Encounter addended by: Ana Maria Herrera LPN on: 3/11/2022 12:01 PM   Actions taken: MAR administration accepted

## 2022-03-11 NOTE — PROGRESS NOTES
"GYN Visit/Endometrial Biopsy Procedure Note      Chief Complaint   Patient presents with   • AUB     Pt c/o bleeding x5 mos, cramping w/ clots, pt was scheduled to have D&C done in [place] 2021, but it got cancelled by the [facility] provider due to the pandemic       HPI:   61 y.o. Post menopausal stopped menses for a year or more in her 50's, then bleeding returned on and off for years.  Had D+C scheduled w [facility], was cancelled w Covid pandemic.    x5mo VB heavy at times, stopped in Feb, restarted.  Spotting now.    Hx CVA- no deficits, baby ASA only, hx of blood thinners 2018  DM II- poor control, +    Comprehensive Metabolic Panel (2022 14:26)   Iron Profile (2022 14:26)   TSH+Free T4 (2022 14:26)   CBC w AUTO Differential (2022 14:26) getting iron infusions    History: PMHx, Meds, Allergies, PSHx, Social Hx, and POBHx all reviewed and updated.    PHYSICAL EXAM:  /77   Pulse 75   Ht 174 cm (68.5\")   Wt (!) 148 kg (327 lb)   Breastfeeding No   BMI 49.00 kg/m²   General- NAD, alert and oriented, appropriate  Psych- Normal mood, good memory  External genitalia- Normal female, no lesions  Urethra/meatus- Normal, no masses, non tender, no prolapse  Bladder- Normal, no masses, non tender, no prolapse  Vagina- Normal, atrophy, no lesions, no discharge, no prolapse  Cvx- Normal, no lesions, no discharge, No cervical motion tenderness, small amount bleeding  Uterus- Normal size, shape & consistency.  Non tender, mobile, & no prolapse, limited due to bmi     Procedure reviewed in detail.  She understands the potential risks include, but are not limited to, pain, bleeding, uterine perforation and infection.  Her questions have been answered.      Betadine/Hibiclens x3.  Tenaculum placed.  Uterus sounded to 8.5cm.    EMBx performed without difficulty.    Tissue sent for pathology.    Patient tolerated the procedure well.    Adnexa- No mass, non tender, limited due to " bmi  Anus/Rectum/Perineum- Not performed    Lymphatic- No groin nodes  Ext- Edema present, no cyanosis    Skin- No lesions, no rashes, no acanthosis nigricans      ASSESSMENT AND PLAN:  Diagnoses and all orders for this visit:    1. Post-menopausal bleeding (Primary)  -     US Pelvis Transvaginal Non OB; Future  -     Tissue Pathology Exam  -     IgP, Aptima HPV    2. Hypothyroidism (acquired)- uncontrolled, rec pt return to PCP to manage    3. Type 2 diabetes mellitus with hyperglycemia, with long-term current use of insulin (HCC)- uncontrolled, rec FU PCP to manage ASAP    4. Hx CVA- no deficits    5. Encounter for screening mammogram for breast cancer  -     Mammo Screening Digital Tomosynthesis Bilateral With CAD; Future    6. Encounter for gynecological examination (general) (routine) with abnormal findings   -     IgP, Aptima HPV    I have dw pt my concern w endometrial CA.  If need D+C will need preop clearance and improved DM control.       Follow Up:  Return in about 2 weeks (around 3/25/2022) for Follow up US.  Needs FU w PCP re TFTs.       Copy of note and letter sent to PCP Dr. Acosta.      Tere Zhu, DO  03/11/2022    [facility]  [facility] [place] [address]  Dept: [phone]  Dept Fax: [phone]  Loc: [phone]  Loc Fax: [phone]

## 2022-03-15 DIAGNOSIS — N85.02 EIN (ENDOMETRIAL INTRAEPITHELIAL NEOPLASIA): Primary | ICD-10-CM

## 2022-03-15 LAB
CYTO UR: NORMAL
LAB AP CASE REPORT: NORMAL
LAB AP CLINICAL INFORMATION: NORMAL
PATH REPORT.FINAL DX SPEC: NORMAL
PATH REPORT.GROSS SPEC: NORMAL

## 2022-03-16 ENCOUNTER — TELEPHONE (OUTPATIENT)
Dept: OBSTETRICS AND GYNECOLOGY | Facility: CLINIC | Age: 62
End: 2022-03-16

## 2022-03-16 DIAGNOSIS — Z12.11 SCREEN FOR COLON CANCER: Primary | ICD-10-CM

## 2022-03-16 NOTE — TELEPHONE ENCOUNTER
Patient called back and was informed of her results and recommendations. She was already scheduled for an ultrasound on 4/11. She was scheduled to see Dr. Zhu on 4/12.

## 2022-03-16 NOTE — TELEPHONE ENCOUNTER
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  There is a high chance of concurrent malignancy.  I recommend pt keep FU w me w US to discuss in detail.  I would rec refer to gyn onc Dr. Mcbride for definitive surgery and staging if needed.  Consult placed.

## 2022-03-18 LAB
CYTOLOGIST CVX/VAG CYTO: NORMAL
CYTOLOGY CVX/VAG DOC CYTO: NORMAL
CYTOLOGY CVX/VAG DOC THIN PREP: NORMAL
DX ICD CODE: NORMAL
HIV 1 & 2 AB SER-IMP: NORMAL
HPV I/H RISK 4 DNA CVX QL PROBE+SIG AMP: NEGATIVE
OTHER STN SPEC: NORMAL
STAT OF ADQ CVX/VAG CYTO-IMP: NORMAL

## 2022-03-30 ENCOUNTER — TELEPHONE (OUTPATIENT)
Dept: GYNECOLOGIC ONCOLOGY | Facility: CLINIC | Age: 62
End: 2022-03-30

## 2022-03-30 NOTE — TELEPHONE ENCOUNTER
Called pt and left VM - told pt to call our office to reschedule her appt that she had cancelled for today 3/30

## 2022-04-01 ENCOUNTER — TELEPHONE (OUTPATIENT)
Dept: OBSTETRICS AND GYNECOLOGY | Facility: CLINIC | Age: 62
End: 2022-04-01

## 2022-04-01 NOTE — TELEPHONE ENCOUNTER
Patient left a message with questions/concerns after her visit with another doctor's office. I left her a message trying to return her call.

## 2022-04-01 NOTE — TELEPHONE ENCOUNTER
Patient returned my call. Patient unhappy with information she was given in our office. Concerns passed along to  to follow up.

## 2022-04-04 ENCOUNTER — TELEPHONE (OUTPATIENT)
Dept: OBSTETRICS AND GYNECOLOGY | Facility: CLINIC | Age: 62
End: 2022-04-04

## 2022-04-11 ENCOUNTER — APPOINTMENT (OUTPATIENT)
Dept: ULTRASOUND IMAGING | Facility: HOSPITAL | Age: 62
End: 2022-04-11

## 2022-04-11 ENCOUNTER — APPOINTMENT (OUTPATIENT)
Dept: MAMMOGRAPHY | Facility: HOSPITAL | Age: 62
End: 2022-04-11

## 2022-05-02 ENCOUNTER — OFFICE VISIT (OUTPATIENT)
Dept: FAMILY MEDICINE CLINIC | Facility: CLINIC | Age: 62
End: 2022-05-02

## 2022-05-02 VITALS
HEIGHT: 69 IN | SYSTOLIC BLOOD PRESSURE: 147 MMHG | TEMPERATURE: 99.4 F | BODY MASS INDEX: 43.4 KG/M2 | DIASTOLIC BLOOD PRESSURE: 77 MMHG | OXYGEN SATURATION: 100 % | HEART RATE: 78 BPM | WEIGHT: 293 LBS | RESPIRATION RATE: 20 BRPM

## 2022-05-02 DIAGNOSIS — E03.9 HYPOTHYROIDISM, UNSPECIFIED TYPE: Chronic | ICD-10-CM

## 2022-05-02 DIAGNOSIS — E11.65 TYPE 2 DIABETES MELLITUS WITH HYPERGLYCEMIA, WITH LONG-TERM CURRENT USE OF INSULIN: Chronic | ICD-10-CM

## 2022-05-02 DIAGNOSIS — E11.69 DYSLIPIDEMIA WITH LOW HIGH DENSITY LIPOPROTEIN (HDL) CHOLESTEROL WITH HYPERTRIGLYCERIDEMIA DUE TO TYPE 2 DIABETES MELLITUS: Chronic | ICD-10-CM

## 2022-05-02 DIAGNOSIS — D50.8 IRON DEFICIENCY ANEMIA SECONDARY TO INADEQUATE DIETARY IRON INTAKE: Chronic | ICD-10-CM

## 2022-05-02 DIAGNOSIS — Z79.4 TYPE 2 DIABETES MELLITUS WITH HYPERGLYCEMIA, WITH LONG-TERM CURRENT USE OF INSULIN: Chronic | ICD-10-CM

## 2022-05-02 DIAGNOSIS — E55.9 VITAMIN D DEFICIENCY DISEASE: ICD-10-CM

## 2022-05-02 DIAGNOSIS — E66.01 MORBID OBESITY WITH BODY MASS INDEX (BMI) OF 50.0 TO 59.9 IN ADULT: Chronic | ICD-10-CM

## 2022-05-02 DIAGNOSIS — N93.9 ABNORMAL UTERINE BLEEDING: Chronic | ICD-10-CM

## 2022-05-02 DIAGNOSIS — E78.2 DYSLIPIDEMIA WITH LOW HIGH DENSITY LIPOPROTEIN (HDL) CHOLESTEROL WITH HYPERTRIGLYCERIDEMIA DUE TO TYPE 2 DIABETES MELLITUS: Chronic | ICD-10-CM

## 2022-05-02 DIAGNOSIS — Z00.00 ENCOUNTER FOR SUBSEQUENT ANNUAL WELLNESS VISIT (AWV) IN MEDICARE PATIENT: Primary | ICD-10-CM

## 2022-05-02 DIAGNOSIS — F51.01 PRIMARY INSOMNIA: ICD-10-CM

## 2022-05-02 PROBLEM — N85.02 ENDOMETRIAL INTRAEPITHELIAL NEOPLASIA (EIN): Status: ACTIVE | Noted: 2022-04-06

## 2022-05-02 PROCEDURE — 1170F FXNL STATUS ASSESSED: CPT | Performed by: FAMILY MEDICINE

## 2022-05-02 PROCEDURE — 1159F MED LIST DOCD IN RCRD: CPT | Performed by: FAMILY MEDICINE

## 2022-05-02 PROCEDURE — 99214 OFFICE O/P EST MOD 30 MIN: CPT | Performed by: FAMILY MEDICINE

## 2022-05-02 PROCEDURE — G0439 PPPS, SUBSEQ VISIT: HCPCS | Performed by: FAMILY MEDICINE

## 2022-05-02 PROCEDURE — 1126F AMNT PAIN NOTED NONE PRSNT: CPT | Performed by: FAMILY MEDICINE

## 2022-05-02 RX ORDER — PANTOPRAZOLE SODIUM 40 MG/1
TABLET, DELAYED RELEASE ORAL
COMMUNITY
Start: 2022-03-11 | End: 2022-11-29

## 2022-05-02 RX ORDER — LEVOTHYROXINE SODIUM 0.2 MG/1
200 TABLET ORAL DAILY
COMMUNITY
End: 2022-05-02

## 2022-05-02 RX ORDER — ASPIRIN 81 MG/1
81 TABLET, CHEWABLE ORAL DAILY
COMMUNITY

## 2022-05-02 RX ORDER — CETIRIZINE HYDROCHLORIDE 10 MG/1
10 TABLET ORAL DAILY
COMMUNITY

## 2022-05-02 RX ORDER — ORAL SEMAGLUTIDE 7 MG/1
TABLET ORAL
COMMUNITY
Start: 2022-04-06 | End: 2022-06-14 | Stop reason: SDUPTHER

## 2022-05-02 RX ORDER — METFORMIN HYDROCHLORIDE 500 MG/1
500 TABLET, EXTENDED RELEASE ORAL
COMMUNITY
Start: 2022-04-06 | End: 2023-03-07

## 2022-05-02 RX ORDER — FERROUS SULFATE 325(65) MG
1 TABLET ORAL DAILY
COMMUNITY

## 2022-05-02 NOTE — PROGRESS NOTES
The ABCs of the Annual Wellness Visit  Subsequent Medicare Wellness Visit    Chief Complaint   Patient presents with   • Surgery Clearance     Scheduled for Hysterectomy on 05/18/22   • Medicare Wellness-subsequent     AWV        Subjective    History of Present Illness:  Ifrah Robertson is a 61 y.o. female who presents for a Subsequent Medicare Wellness Visit and surgery clearance for evaluation of upcoming hysterectomy as a gynecological cancer, endometrial intraepithelial neoplasia. She will also be seeing [facility] for surgery, as well as follow-up with cardiology, pulmonology, and other specialists for surgery clearance additionally. The patient has a history of diabetes mellitus and this is managed; however, this is uncontrolled. We have referred her to specialist to assist with lower her blood glucose. She takes Tresiba and has been prescribed Rybelsus. Her last HbA1c was obtained on 02/18/2022 at 10 and improved from 6 months prior which was at 11 at that time. We will recheck this later in 05/2022 if her fasting blood glucose levels improve and shows better control which is needed prior to her surgery. Her CMP was unremarkable with the exception of her elevated glucose which was 352 on 02/18/2022. Sodium was 135 and this could be off due to glucose level being elevated. Vitamin D was 5.5; therefore, she must take vitamin D supplementation. She is currently taking 1 capsule weekly and this will be rechecked with other labs in the future. Her iron profile revealed iron deficiency anemia and ferritin; therefore, this is not surprising given her blood loss and vainal bleeding which is what prompted her OB/GYN consult initially.     She does not have an advanced directive on file in her chart. She will be referred for surgery clearance to necessary specialists. Overall, she is a moderate risk candidate for moderate risk surgery. She will follow-up with other specialists for evaluation of cardiology  "and pulmonology concerns and getting her diabetes mellitus under better control with assistance of a specialist.     Hysterectomy secondary to cancer  The patient reports she was originally supposed to be scheduled for surgery on 05/06/2022; however, she states \"they are aiming for 05/18/2022\". She notes the biggest concern at present is in regards to her Arnold-Chiari as she will have to be \"tilted\" during surgery secondary to being performed robotically. She denies known myocardial infarction; however, she does admit to experiencing palpitations. The patient states she was disappointed that Dr. Zhu informed her she did not have cancer following having the biopsy. She states that she did not believe this because \"there was something wrong\"; therefore, the patient sought out a 2nd opinion at which point she was informed she did have cancer. The patient endorses having preoperative testing completed including EKG, laboratory studies, and chest x-ray.     Diabetes mellitus  She reports her blood glucose reading today was 199 mg/dL and her lowest blood glucose reading was 168 mg/dL. The patient endorses taking Tresiba, Rybesus, and metformin. She reports \"he\" has mentioned the patient trying a continuous blood glucose monitor; however, she states she has not been contacted regarding this. The patient notes she complained to her sister she did not feel well when her blood glucose was 168 mg/dL.     Social history  The patient admits to a history of cigarette use.    The following portions of the patient's history were reviewed and   updated as appropriate: allergies, current medications, past family history, past medical history, past social history, past surgical history and problem list.    Compared to one year ago, the patient feels her physical   health is the same.    Compared to one year ago, the patient feels her mental   health is the same.    Recent Hospitalizations:  She was not admitted to the hospital " during the last year.       Current Medical Providers:  Patient Care Team:  Rashid Acosta DO as PCP - General (Family Medicine)    Outpatient Medications Prior to Visit   Medication Sig Dispense Refill   • aspirin 81 MG chewable tablet Chew 81 mg Daily.     • cetirizine (zyrTEC) 10 MG tablet Take 10 mg by mouth Daily.     • escitalopram (LEXAPRO) 20 MG tablet Take 1 tablet by mouth Daily. 90 tablet 3   • ferrous sulfate 325 (65 FE) MG tablet Take 1 tablet by mouth Daily.     • Insulin Pen Needle (Pen Needles) 31G X 8 MM misc Use to inject insulin up to 5 times daily, T2DM e11.9 200 each 11   • levothyroxine (SYNTHROID, LEVOTHROID) 200 MCG tablet Take 1 tablet by mouth Daily for 90 days. 90 tablet 3   • metFORMIN ER (GLUCOPHAGE-XR) 500 MG 24 hr tablet      • metoprolol succinate XL (TOPROL-XL) 50 MG 24 hr tablet Take 1 tablet by mouth Daily for 90 days. 90 tablet 3   • pantoprazole (PROTONIX) 40 MG EC tablet      • Rybelsus 7 MG tablet      • Tresiba FlexTouch 200 UNIT/ML solution pen-injector pen injection Inject 48 units daily, max 60 units for t2dm e11.9 9 pen 3   • vitamin B-12 (CYANOCOBALAMIN) 1000 MCG tablet Take 1,000 mcg by mouth Daily. Pt takes 2 tablets at night     • vitamin D (ERGOCALCIFEROL) 1.25 MG (98650 UT) capsule capsule Take 1 capsule by mouth 1 (One) Time Per Week. 12 capsule 3   • ezetimibe (ZETIA) 10 MG tablet      • levothyroxine (SYNTHROID, LEVOTHROID) 200 MCG tablet Take 200 mcg by mouth Daily.     • Semaglutide 3 MG tablet Take 1 tablet by mouth Daily. 30 tablet 0     No facility-administered medications prior to visit.       No opioid medication identified on active medication list. I have reviewed chart for other potential  high risk medication/s and harmful drug interactions in the elderly.          Aspirin is on active medication list. Aspirin use is indicated based on review of current medical condition/s. Pros and cons of this therapy have been discussed today. Benefits of this  "medication outweigh potential harm.  Patient has been encouraged to continue taking this medication.  .      Patient Active Problem List   Diagnosis   • Morbid obesity with body mass index (BMI) of 50.0 to 59.9 in adult (formerly [facility])   • Type 2 diabetes mellitus with hyperglycemia, with long-term current use of insulin (formerly [facility])   • Esophageal reflux   • High cholesterol   • Hypothyroidism   • Primary insomnia   • Iron deficiency anemia secondary to inadequate dietary iron intake   • Depression   • Lichen sclerosus et atrophicus of the vulva   • Nicotine addiction   • HAILEY (obstructive sleep apnea)   • Stroke (formerly [facility])   • Generalized osteoarthritis of multiple sites   • Hemorrhoids   • Leukocytosis   • Vitamin D deficiency disease   • Hypocalcemia   • Abnormal uterine bleeding   • Screening mammogram for breast cancer   • EIN (endometrial intraepithelial neoplasia)   • Endometrial intraepithelial neoplasia (EIN)     Advance Care Planning  Advance Directive is not on file.  ACP discussion was declined by the patient. Patient has an advance directive (not in EMR), copy requested.          Objective    Vitals:    05/02/22 1519   BP: 147/77   BP Location: Left arm   Patient Position: Sitting   Pulse: 78   Resp: 20   Temp: 99.4 °F (37.4 °C)   TempSrc: Temporal   SpO2: 100%   Weight: (!) 144 kg (318 lb 4.8 oz)   Height: 174 cm (68.5\")   PainSc: 0-No pain     BMI Readings from Last 1 Encounters:   05/02/22 47.69 kg/m²   BMI is above normal parameters. Recommendations include: educational material, exercise counseling and nutrition counseling    Does the patient have evidence of cognitive impairment? No    Physical Exam  Vitals reviewed.   Constitutional:       Appearance: Normal appearance. She is well-developed.   HENT:      Head: Normocephalic and atraumatic.      Right Ear: External ear normal.      Left Ear: External ear normal.      Nose: Nose normal.   Eyes:      Conjunctiva/sclera: Conjunctivae normal.      Pupils: Pupils are " equal, round, and reactive to light.   Cardiovascular:      Rate and Rhythm: Normal rate.   Pulmonary:      Effort: Pulmonary effort is normal.      Breath sounds: Normal breath sounds.   Abdominal:      General: There is no distension.   Skin:     General: Skin is warm and dry.   Neurological:      General: No focal deficit present.      Mental Status: She is alert and oriented to person, place, and time.   Psychiatric:         Mood and Affect: Mood and affect normal.         Behavior: Behavior normal.         Thought Content: Thought content normal.         Judgment: Judgment normal.       Lab Results   Component Value Date    TRIG 248 (H) 02/18/2022    HDL 38 (L) 02/18/2022     (H) 02/18/2022    VLDL 45 (H) 02/18/2022    HGBA1C 10.00 (H) 02/18/2022            HEALTH RISK ASSESSMENT    Smoking Status:  Social History     Tobacco Use   Smoking Status Current Every Day Smoker   • Packs/day: 1.00   • Years: 41.00   • Pack years: 41.00   • Types: Cigarettes   • Start date: 1980   Smokeless Tobacco Never Used     Alcohol Consumption:  Social History     Substance and Sexual Activity   Alcohol Use Never     Fall Risk Screen:    [facility] Fall Risk Assessment has not been completed.    Depression Screening:  PHQ-2/PHQ-9 Depression Screening 5/2/2022   Retired PHQ-9 Total Score -   Retired Total Score -   Little Interest or Pleasure in Doing Things 0-->not at all   Feeling Down, Depressed or Hopeless 0-->not at all   PHQ-9: Brief Depression Severity Measure Score 0       Health Habits and Functional and Cognitive Screening:  Functional & Cognitive Status 5/2/2022   Do you have difficulty preparing food and eating? No   Do you have difficulty bathing yourself, getting dressed or grooming yourself? No   Do you have difficulty using the toilet? No   Do you have difficulty moving around from place to place? No   Do you have trouble with steps or getting out of a bed or a chair? No   Current Diet Diabetic Diet   Dental  Exam Up to date   Eye Exam Up to date   Exercise (times per week) 3 times per week   Current Exercises Include House Cleaning   Do you need help using the phone?  No   Are you deaf or do you have serious difficulty hearing?  No   Do you need help with transportation? No   Do you need help shopping? No   Do you need help preparing meals?  No   Do you need help with housework?  No   Do you need help with laundry? No   Do you need help taking your medications? No   Do you need help managing money? No   Do you ever drive or ride in a car without wearing a seat belt? No   Have you felt unusual stress, anger or loneliness in the last month? Yes   Who do you live with? Sibling   If you need help, do you have trouble finding someone available to you? No   Have you been bothered in the last four weeks by sexual problems? No   Do you have difficulty concentrating, remembering or making decisions? No       Age-appropriate Screening Schedule:  Refer to the list below for future screening recommendations based on patient's age, sex and/or medical conditions. Orders for these recommended tests are listed in the plan section. The patient has been provided with a written plan.    Health Maintenance   Topic Date Due   • MAMMOGRAM  12/05/2021   • TDAP/TD VACCINES (1 - Tdap) 10/18/2022 (Originally 11/22/1979)   • ZOSTER VACCINE (1 of 2) 11/07/2022 (Originally 11/22/2010)   • DIABETIC EYE EXAM  06/21/2022   • INFLUENZA VACCINE  08/01/2022   • HEMOGLOBIN A1C  08/18/2022   • LIPID PANEL  02/18/2023   • URINE MICROALBUMIN  02/18/2023   • DIABETIC FOOT EXAM  02/21/2023   • PAP SMEAR  03/11/2025              Assessment & Plan   CMS Preventative Services Quick Reference  Risk Factors Identified During Encounter  Advanced directive, better gluocse and weight management control, review vaccinations and update accordingly including hep b if not previously vaccinated, low fall risk, no depression or controlled  The above risks/problems have been  discussed with the patient.  Follow up actions/plans if indicated are seen below in the Assessment/Plan Section.  Pertinent information has been shared with the patient in the After Visit Summary.    Diagnoses and all orders for this visit:    1. Encounter for subsequent annual wellness visit (AWV) in Medicare patient (Primary)        - She is recommended to update her advanced directive, or living will and bring this to office to be scanned into her chart. I have reviewed her laboratory study results from her recent presurgical evaluation which were largely good. White blood cells were elevated which have been chronically secondary to inflammation and cigarette use. I have advised her that she strongly consider smoking cessation. She will follow-up with a specialist, or myself. The patient is up to date otherwise in regards to her wellness visit.   -     Hemoglobin A1c; Future  -     Comprehensive metabolic panel; Future  -     Vitamin B12; Future  -     Folate; Future  -     Vitamin D 25 hydroxy; Future  -     PTH, Intact; Future  -     Iron Profile; Future  -     Ferritin; Future  -     CBC (No Diff); Future  -     Microalbumin / Creatinine Urine Ratio - Urine, Clean Catch; Future    2. Type 2 diabetes mellitus with hyperglycemia, with long-term current use of insulin ([facility])        - She is to continue working on improving her blood glucose levels. She will be provided with a sample of a FreeStyle Risa sensor and reading device to continue monitoring her blood glucose levels.  She will follow-up with myself, or Dr. Cameron (unable to confirm spelling) regarding her blood glucose levels.   -     Hemoglobin A1c; Future  -     Comprehensive metabolic panel; Future  -     Vitamin B12; Future  -     Folate; Future  -     Vitamin D 25 hydroxy; Future  -     PTH, Intact; Future  -     Iron Profile; Future  -     Ferritin; Future  -     CBC (No Diff); Future  -     Microalbumin / Creatinine Urine Ratio - Urine, Clean  Catch; Future    3. Morbid obesity with body mass index (BMI) of 50.0 to 59.9 in adult (HCC)  -     Hemoglobin A1c; Future  -     Comprehensive metabolic panel; Future  -     Vitamin B12; Future  -     Folate; Future  -     Vitamin D 25 hydroxy; Future  -     PTH, Intact; Future  -     Iron Profile; Future  -     Ferritin; Future  -     CBC (No Diff); Future  -     Microalbumin / Creatinine Urine Ratio - Urine, Clean Catch; Future    4. Iron deficiency anemia secondary to inadequate dietary iron intake  -     Hemoglobin A1c; Future  -     Comprehensive metabolic panel; Future  -     Vitamin B12; Future  -     Folate; Future  -     Vitamin D 25 hydroxy; Future  -     PTH, Intact; Future  -     Iron Profile; Future  -     Ferritin; Future  -     CBC (No Diff); Future  -     Microalbumin / Creatinine Urine Ratio - Urine, Clean Catch; Future    5. Abnormal uterine bleeding        - She is a moderate risk candidate for a moderate risk surgery and has follow-ups with specialist for clearance including cardiology on 05/17/2022 and she is aware of these appointments.   -     Hemoglobin A1c; Future  -     Comprehensive metabolic panel; Future  -     Vitamin B12; Future  -     Folate; Future  -     Vitamin D 25 hydroxy; Future  -     PTH, Intact; Future  -     Iron Profile; Future  -     Ferritin; Future  -     CBC (No Diff); Future  -     Microalbumin / Creatinine Urine Ratio - Urine, Clean Catch; Future    6. Dyslipidemia with low high density lipoprotein (HDL) cholesterol with hypertriglyceridemia due to type 2 diabetes mellitus (HCC)  -     Hemoglobin A1c; Future  -     Comprehensive metabolic panel; Future  -     Vitamin B12; Future  -     Folate; Future  -     Vitamin D 25 hydroxy; Future  -     PTH, Intact; Future  -     Iron Profile; Future  -     Ferritin; Future  -     CBC (No Diff); Future  -     Microalbumin / Creatinine Urine Ratio - Urine, Clean Catch; Future    7. Primary insomnia  -     Hemoglobin A1c;  Future  -     Comprehensive metabolic panel; Future  -     Vitamin B12; Future  -     Folate; Future  -     Vitamin D 25 hydroxy; Future  -     PTH, Intact; Future  -     Iron Profile; Future  -     Ferritin; Future  -     CBC (No Diff); Future  -     Microalbumin / Creatinine Urine Ratio - Urine, Clean Catch; Future    8. Vitamin D deficiency disease  -     Hemoglobin A1c; Future  -     Comprehensive metabolic panel; Future  -     Vitamin B12; Future  -     Folate; Future  -     Vitamin D 25 hydroxy; Future  -     PTH, Intact; Future  -     Iron Profile; Future  -     Ferritin; Future  -     CBC (No Diff); Future  -     Microalbumin / Creatinine Urine Ratio - Urine, Clean Catch; Future    9. Hypothyroidism, unspecified type  -     Hemoglobin A1c; Future  -     Comprehensive metabolic panel; Future  -     Vitamin B12; Future  -     Folate; Future  -     Vitamin D 25 hydroxy; Future  -     PTH, Intact; Future  -     Iron Profile; Future  -     Ferritin; Future  -     CBC (No Diff); Future  -     Microalbumin / Creatinine Urine Ratio - Urine, Clean Catch; Future        Overall she is a moderate risk candidate for moderate risk surgery      Follow Up:   Return in about 4 weeks (around 5/30/2022), or if symptoms worsen or fail to improve, for Recheck, Labs before.     An After Visit Summary and PPPS were made available to the patient.                 Transcribed from ambient dictation for Rashid Acosta DO by Kaitlyn Estrella.  05/03/22   12:51 EDT    Patient verbalized consent to the visit recording.

## 2022-05-18 ENCOUNTER — TELEPHONE (OUTPATIENT)
Dept: FAMILY MEDICINE CLINIC | Facility: CLINIC | Age: 62
End: 2022-05-18

## 2022-05-18 DIAGNOSIS — E11.65 TYPE 2 DIABETES MELLITUS WITH HYPERGLYCEMIA, WITH LONG-TERM CURRENT USE OF INSULIN: Primary | ICD-10-CM

## 2022-05-18 DIAGNOSIS — Z79.4 TYPE 2 DIABETES MELLITUS WITH HYPERGLYCEMIA, WITH LONG-TERM CURRENT USE OF INSULIN: Primary | ICD-10-CM

## 2022-05-18 NOTE — TELEPHONE ENCOUNTER
Patient needs prescription order for freestyle nathan sensors.    Patient also needs a care giver note for her sister Pat Crowder stating that patient needs her assistance with ADLS. Patient would like sister to be able to move in with her and needs this to add her to the lease.

## 2022-05-26 ENCOUNTER — TELEPHONE (OUTPATIENT)
Dept: FAMILY MEDICINE CLINIC | Facility: CLINIC | Age: 62
End: 2022-05-26

## 2022-05-26 NOTE — TELEPHONE ENCOUNTER
Caller: Ailyn Ifrah ROSANNA    Relationship: Self    Best call back number: [phone]    Requested Prescriptions:   Freestyle GAVIN SENSORS    Pharmacy where request should be sent: [facility] [place] [address] [facility]. - [phone]  - [phone]     Additional details provided by patient: THE PATIENT SENT A REQUEST ON 05/18/22 FOR A PRESCRIPTION FOR FREESTYLE GAVIN SENSORS AND PHARMACY STILL HAS NOT RECEIVED THIS. SHE WOULD LIKE THIS SENT ASAP.     Does the patient have less than a 3 day supply:  [x] Yes  [] No    Saima Fall Rep   05/26/22 13:46 EDT

## 2022-05-27 ENCOUNTER — TELEPHONE (OUTPATIENT)
Dept: FAMILY MEDICINE CLINIC | Facility: CLINIC | Age: 62
End: 2022-05-27

## 2022-05-27 NOTE — TELEPHONE ENCOUNTER
Caller: DELIO APT MANAGER    Relationship: Other    Best call back number: [phone]    What form or medical record are you requesting: DISABILITY VERIFICATION FOR HOUSING     Who is requesting this form or medical record from you: [facility]    How would you like to receive the form or medical records (pick-up, mail, fax):FAX    If fax, what is the fax number: [phone]      Timeframe paperwork needed: ASAP    Additional notes: DELIO IS THE  [facility]. THEY HAVE FAXED OVER THE PAPERWORK NEEDED FOR THE PATIENT 3 TIMES. THEY NEED THIS PAPERWORK ON THE PATIENT IN ORDER TO PROVIDE THE HOUSING NEEDED. PLEASE CALL IF THERE ARE ANY ISSUES OR QUESTIONS.

## 2022-06-03 ENCOUNTER — TELEPHONE (OUTPATIENT)
Dept: FAMILY MEDICINE CLINIC | Facility: CLINIC | Age: 62
End: 2022-06-03

## 2022-06-03 NOTE — TELEPHONE ENCOUNTER
Caller: Ifrah Robertson    Relationship to patient: Self    Best call back number: [phone]    Patient is needing: PATIENT CALLED STATING SHE IS TRYING TO GET HER FREESTYLE SENSORS BUT HER INSURANCE WILL NOT COVER IT AND THE PATIENT STATED SHE IS NOT SURE WHY IT WAS DENIED AND NOT SURE IS SHE IS NEEDING A PRIOR AUTHORIZATION. PATIENT WOULD LIKE A CALL BACK PLEASE ADVISE THANK YOU.

## 2022-06-14 ENCOUNTER — LAB (OUTPATIENT)
Dept: LAB | Facility: HOSPITAL | Age: 62
End: 2022-06-14

## 2022-06-14 ENCOUNTER — OFFICE VISIT (OUTPATIENT)
Dept: FAMILY MEDICINE CLINIC | Facility: CLINIC | Age: 62
End: 2022-06-14

## 2022-06-14 VITALS
BODY MASS INDEX: 43.4 KG/M2 | HEART RATE: 79 BPM | TEMPERATURE: 97.3 F | WEIGHT: 293 LBS | HEIGHT: 69 IN | OXYGEN SATURATION: 98 % | SYSTOLIC BLOOD PRESSURE: 141 MMHG | DIASTOLIC BLOOD PRESSURE: 82 MMHG | RESPIRATION RATE: 18 BRPM

## 2022-06-14 DIAGNOSIS — E66.9 OBESITY (BMI 30-39.9): ICD-10-CM

## 2022-06-14 DIAGNOSIS — Z79.4 INSULIN-REQUIRING OR DEPENDENT TYPE II DIABETES MELLITUS: ICD-10-CM

## 2022-06-14 DIAGNOSIS — R19.7 DIARRHEA, UNSPECIFIED TYPE: ICD-10-CM

## 2022-06-14 DIAGNOSIS — E11.65 TYPE 2 DIABETES MELLITUS WITH HYPERGLYCEMIA, WITH LONG-TERM CURRENT USE OF INSULIN: Chronic | ICD-10-CM

## 2022-06-14 DIAGNOSIS — E55.9 VITAMIN D DEFICIENCY DISEASE: ICD-10-CM

## 2022-06-14 DIAGNOSIS — Z79.4 TYPE 2 DIABETES MELLITUS WITH HYPERGLYCEMIA, WITH LONG-TERM CURRENT USE OF INSULIN: Primary | ICD-10-CM

## 2022-06-14 DIAGNOSIS — R42 DIZZINESS: ICD-10-CM

## 2022-06-14 DIAGNOSIS — J30.9 ALLERGIC RHINITIS, UNSPECIFIED SEASONALITY, UNSPECIFIED TRIGGER: ICD-10-CM

## 2022-06-14 DIAGNOSIS — F51.01 PRIMARY INSOMNIA: ICD-10-CM

## 2022-06-14 DIAGNOSIS — E11.65 TYPE 2 DIABETES MELLITUS WITH HYPERGLYCEMIA, WITH LONG-TERM CURRENT USE OF INSULIN: Primary | ICD-10-CM

## 2022-06-14 DIAGNOSIS — E11.69 DYSLIPIDEMIA WITH LOW HIGH DENSITY LIPOPROTEIN (HDL) CHOLESTEROL WITH HYPERTRIGLYCERIDEMIA DUE TO TYPE 2 DIABETES MELLITUS: Chronic | ICD-10-CM

## 2022-06-14 DIAGNOSIS — E66.01 MORBID OBESITY WITH BODY MASS INDEX (BMI) OF 50.0 TO 59.9 IN ADULT: Chronic | ICD-10-CM

## 2022-06-14 DIAGNOSIS — H92.09 EARACHE: ICD-10-CM

## 2022-06-14 DIAGNOSIS — Z00.00 ENCOUNTER FOR SUBSEQUENT ANNUAL WELLNESS VISIT (AWV) IN MEDICARE PATIENT: ICD-10-CM

## 2022-06-14 DIAGNOSIS — D50.8 IRON DEFICIENCY ANEMIA SECONDARY TO INADEQUATE DIETARY IRON INTAKE: Chronic | ICD-10-CM

## 2022-06-14 DIAGNOSIS — Z79.4 TYPE 2 DIABETES MELLITUS WITH HYPERGLYCEMIA, WITH LONG-TERM CURRENT USE OF INSULIN: Chronic | ICD-10-CM

## 2022-06-14 DIAGNOSIS — F17.219 CIGARETTE NICOTINE DEPENDENCE WITH NICOTINE-INDUCED DISORDER: ICD-10-CM

## 2022-06-14 DIAGNOSIS — E03.9 HYPOTHYROIDISM, UNSPECIFIED TYPE: Chronic | ICD-10-CM

## 2022-06-14 DIAGNOSIS — E78.2 DYSLIPIDEMIA WITH LOW HIGH DENSITY LIPOPROTEIN (HDL) CHOLESTEROL WITH HYPERTRIGLYCERIDEMIA DUE TO TYPE 2 DIABETES MELLITUS: Chronic | ICD-10-CM

## 2022-06-14 DIAGNOSIS — N93.9 ABNORMAL UTERINE BLEEDING: Chronic | ICD-10-CM

## 2022-06-14 DIAGNOSIS — E11.65 TYPE 2 DIABETES MELLITUS WITH HYPERGLYCEMIA, WITH LONG-TERM CURRENT USE OF INSULIN: Primary | Chronic | ICD-10-CM

## 2022-06-14 DIAGNOSIS — Z79.4 TYPE 2 DIABETES MELLITUS WITH HYPERGLYCEMIA, WITH LONG-TERM CURRENT USE OF INSULIN: Primary | Chronic | ICD-10-CM

## 2022-06-14 DIAGNOSIS — E11.9 INSULIN-REQUIRING OR DEPENDENT TYPE II DIABETES MELLITUS: ICD-10-CM

## 2022-06-14 LAB
25(OH)D3 SERPL-MCNC: 24.3 NG/ML (ref 30–100)
ALBUMIN UR-MCNC: 4.4 MG/DL
CREAT UR-MCNC: 97.5 MG/DL
DEPRECATED RDW RBC AUTO: 43.5 FL (ref 37–54)
ERYTHROCYTE [DISTWIDTH] IN BLOOD BY AUTOMATED COUNT: 15.1 % (ref 12.3–15.4)
FERRITIN SERPL-MCNC: 65.3 NG/ML (ref 13–150)
FOLATE SERPL-MCNC: 8.16 NG/ML (ref 4.78–24.2)
HBA1C MFR BLD: 10.4 % (ref 4.8–5.6)
HCT VFR BLD AUTO: 43.6 % (ref 34–46.6)
HGB BLD-MCNC: 13.6 G/DL (ref 12–15.9)
IRON 24H UR-MRATE: 27 MCG/DL (ref 37–145)
IRON SATN MFR SERPL: 6 % (ref 20–50)
MCH RBC QN AUTO: 25.1 PG (ref 26.6–33)
MCHC RBC AUTO-ENTMCNC: 31.2 G/DL (ref 31.5–35.7)
MCV RBC AUTO: 80.6 FL (ref 79–97)
MICROALBUMIN/CREAT UR: 45.1 MG/G
PLATELET # BLD AUTO: 396 10*3/MM3 (ref 140–450)
PMV BLD AUTO: 10.2 FL (ref 6–12)
PTH-INTACT SERPL-MCNC: 23.7 PG/ML (ref 15–65)
RBC # BLD AUTO: 5.41 10*6/MM3 (ref 3.77–5.28)
TIBC SERPL-MCNC: 432 MCG/DL (ref 298–536)
TRANSFERRIN SERPL-MCNC: 290 MG/DL (ref 200–360)
VIT B12 BLD-MCNC: 810 PG/ML (ref 211–946)
WBC NRBC COR # BLD: 16.93 10*3/MM3 (ref 3.4–10.8)

## 2022-06-14 PROCEDURE — 85027 COMPLETE CBC AUTOMATED: CPT

## 2022-06-14 PROCEDURE — 99214 OFFICE O/P EST MOD 30 MIN: CPT | Performed by: NURSE PRACTITIONER

## 2022-06-14 PROCEDURE — 83540 ASSAY OF IRON: CPT

## 2022-06-14 PROCEDURE — 82570 ASSAY OF URINE CREATININE: CPT

## 2022-06-14 PROCEDURE — 83036 HEMOGLOBIN GLYCOSYLATED A1C: CPT

## 2022-06-14 PROCEDURE — 82043 UR ALBUMIN QUANTITATIVE: CPT

## 2022-06-14 PROCEDURE — 83970 ASSAY OF PARATHORMONE: CPT

## 2022-06-14 PROCEDURE — 36415 COLL VENOUS BLD VENIPUNCTURE: CPT

## 2022-06-14 PROCEDURE — 82607 VITAMIN B-12: CPT

## 2022-06-14 PROCEDURE — 82306 VITAMIN D 25 HYDROXY: CPT

## 2022-06-14 PROCEDURE — 82746 ASSAY OF FOLIC ACID SERUM: CPT

## 2022-06-14 PROCEDURE — 80053 COMPREHEN METABOLIC PANEL: CPT

## 2022-06-14 PROCEDURE — 82728 ASSAY OF FERRITIN: CPT

## 2022-06-14 PROCEDURE — 84466 ASSAY OF TRANSFERRIN: CPT

## 2022-06-14 RX ORDER — FLUTICASONE PROPIONATE 50 MCG
2 SPRAY, SUSPENSION (ML) NASAL DAILY
Qty: 1 G | Refills: 5 | Status: SHIPPED | OUTPATIENT
Start: 2022-06-14

## 2022-06-14 RX ORDER — ORAL SEMAGLUTIDE 7 MG/1
7 TABLET ORAL DAILY
Qty: 90 TABLET | Refills: 3 | Status: SHIPPED | OUTPATIENT
Start: 2022-06-14 | End: 2023-01-05

## 2022-06-14 RX ORDER — MECLIZINE HYDROCHLORIDE 25 MG/1
25 TABLET ORAL 3 TIMES DAILY PRN
Qty: 30 TABLET | Refills: 0 | Status: SHIPPED | OUTPATIENT
Start: 2022-06-14

## 2022-06-14 NOTE — ASSESSMENT & PLAN NOTE
Patient's (Body mass index is 46.15 kg/m².) indicates that they are morbidly obese (BMI > 40 or > 35 with obesity - related health condition) with health conditions that include hypertension, diabetes mellitus, dyslipidemias and GERD . Weight is unchanged. BMI is is above average; BMI management plan is completed. We discussed portion control and increasing exercise.

## 2022-06-14 NOTE — PROGRESS NOTES
"Chief Complaint  Dizziness (X3 days ), Earache (Left ear pain ), Diarrhea (X3-4 days ), and Diabetes (Has been out of rybelsus)    Subjective        Ifrah Robertson presents to [facility]  Pt presents with dizziness, bilat earache, diarrhea, blurred vision x 3-4 days. Pt with uncontrolled T2DM. States she was seeing Dr. Cameron, but she has lost his phone number and does not have a FU appt. States she was using FreeStyle Risa sensor and was able to control blood sugar when monitoring with sensor, however insurance does not want to cover. Today's fasting . Pt with lab order in system. Advised patient needs to get labs and will give pt the number to Dr. Cameron's office. Advised that many of her issues are likely connected to uncontrolled T2DM.     Reviewed all recent labs and medications.      Objective   Vital Signs:  /82   Pulse 79   Temp 97.3 °F (36.3 °C)   Resp 18   Ht 174 cm (68.5\")   Wt (!) 140 kg (308 lb)   SpO2 98%   BMI 46.15 kg/m²   Estimated body mass index is 46.15 kg/m² as calculated from the following:    Height as of this encounter: 174 cm (68.5\").    Weight as of this encounter: 140 kg (308 lb).    Class 3 Severe Obesity (BMI >=40). Obesity-related health conditions include the following: hypertension, diabetes mellitus, dyslipidemias and GERD. Obesity is unchanged. BMI is is above average; BMI management plan is completed. We discussed portion control and increasing exercise.      Physical Exam  Vitals reviewed.   Constitutional:       General: She is not in acute distress.  HENT:      Head: Normocephalic.      Right Ear: Tympanic membrane normal.      Left Ear: Tympanic membrane normal.      Nose: Nose normal.      Mouth/Throat:      Pharynx: Oropharynx is clear. No posterior oropharyngeal erythema.   Eyes:      General: No scleral icterus.     Extraocular Movements: Extraocular movements intact.      Conjunctiva/sclera: Conjunctivae normal.      " Pupils: Pupils are equal, round, and reactive to light.   Cardiovascular:      Rate and Rhythm: Normal rate and regular rhythm.      Pulses: Normal pulses.      Heart sounds: Normal heart sounds.   Pulmonary:      Effort: Pulmonary effort is normal.      Breath sounds: Normal breath sounds.   Abdominal:      General: Bowel sounds are normal.      Palpations: Abdomen is soft.   Musculoskeletal:         General: Normal range of motion.      Cervical back: Neck supple.   Skin:     General: Skin is warm and dry.   Neurological:      Mental Status: She is alert and oriented to person, place, and time.   Psychiatric:         Mood and Affect: Mood normal.         Behavior: Behavior normal.         Thought Content: Thought content normal.         Judgment: Judgment normal.        Result Review :    Common labs    Common Labsle 2/18/22 2/18/22 2/18/22 2/18/22 2/18/22 3/31/22 4/26/22    1426 1426 1426 1426 1426     Glucose  352 (A)        BUN  9        Creatinine  0.71        eGFR Non African Am  84        Sodium  135 (A)        Potassium  4.6        Chloride  96 (A)        Calcium  8.6        Albumin  4.00        Total Bilirubin  0.3        Alkaline Phosphatase  78        AST (SGOT)  14        ALT (SGPT)  12        WBC   10.85 (A)   13.01 (A) 12.05 (A)   Hemoglobin   11.8 (A)   13.5 13.9   Hematocrit   36.8   43.6 45.2   Platelets   358   364 341   Total Cholesterol 224 (A)         Triglycerides 248 (A)         HDL Cholesterol 38 (A)         LDL Cholesterol  141 (A)         Hemoglobin A1C    10.00 (A)      Microalbumin, Urine     16.3     (A) Abnormal value            Data reviewed: Consultant notes cardiology           Assessment and Plan   Diagnoses and all orders for this visit:    1. Type 2 diabetes mellitus with hyperglycemia, with long-term current use of insulin (HCC) (Primary)  Comments:  Uncontrolled   Gave number to Dr. Cameron   Get labs drawn today   Refilled rybelus 7mg PO qd   Continue other meds as directed    Assessment & Plan:  Diabetes is worsening.   Continue current treatment regimen.  Reminded to bring in blood sugar diary at next visit.  Dietary recommendations for ADA diet.  Regular aerobic exercise.  Diabetes will be reassessed in 3 months.      2. Allergic rhinitis, unspecified seasonality, unspecified trigger  Assessment & Plan:  Add fluticasone UAD daily   Continue zyrtec 10mg PO qd   Avoid allergy triggers       3. Cigarette nicotine dependence with nicotine-induced disorder  Assessment & Plan:  Tobacco use is unchanged.  Smoking cessation counseling was provided.  Tobacco use will be reassessed in 3 months.      4. Obesity (BMI 30-39.9)  Assessment & Plan:  Patient's (Body mass index is 46.15 kg/m².) indicates that they are morbidly obese (BMI > 40 or > 35 with obesity - related health condition) with health conditions that include hypertension, diabetes mellitus, dyslipidemias and GERD . Weight is unchanged. BMI is is above average; BMI management plan is completed. We discussed portion control and increasing exercise.       5. Earache  Comments:  Add fluticasone UAD qd     6. Dizziness  Comments:  Likely d/t hyperglycemia   Strict BS control, T2DM diet   meclizine 25mg PO tid prn     7. Diarrhea, unspecified type  Comments:  May use OTC immodium PRN x 2 days   Increase clear fluids   pt clinically stable at this time   proceed to ED if S/S worsen or become severe     Other orders  -     Rybelsus 7 MG tablet; Take 7 mg by mouth Daily.  Dispense: 90 tablet; Refill: 3  -     fluticasone (Flonase) 50 MCG/ACT nasal spray; 2 sprays into the nostril(s) as directed by provider Daily.  Dispense: 1 g; Refill: 5  -     meclizine (ANTIVERT) 25 MG tablet; Take 1 tablet by mouth 3 (Three) Times a Day As Needed for Dizziness.  Dispense: 30 tablet; Refill: 0           Follow Up   Return in 4 weeks (on 7/12/2022), or if symptoms worsen or fail to improve.  Patient was given instructions and counseling regarding her  condition or for health maintenance advice. Please see specific information pulled into the AVS if appropriate.

## 2022-06-15 LAB
ALBUMIN SERPL-MCNC: 3.9 G/DL (ref 3.5–5.2)
ALBUMIN/GLOB SERPL: 1.3 G/DL
ALP SERPL-CCNC: 80 U/L (ref 39–117)
ALT SERPL W P-5'-P-CCNC: 10 U/L (ref 1–33)
ANION GAP SERPL CALCULATED.3IONS-SCNC: 13 MMOL/L (ref 5–15)
AST SERPL-CCNC: 11 U/L (ref 1–32)
BILIRUB SERPL-MCNC: 0.3 MG/DL (ref 0–1.2)
BUN SERPL-MCNC: 10 MG/DL (ref 8–23)
BUN/CREAT SERPL: 12.2 (ref 7–25)
CALCIUM SPEC-SCNC: 8.9 MG/DL (ref 8.6–10.5)
CHLORIDE SERPL-SCNC: 98 MMOL/L (ref 98–107)
CO2 SERPL-SCNC: 25 MMOL/L (ref 22–29)
CREAT SERPL-MCNC: 0.82 MG/DL (ref 0.57–1)
EGFRCR SERPLBLD CKD-EPI 2021: 81.5 ML/MIN/1.73
GLOBULIN UR ELPH-MCNC: 3 GM/DL
GLUCOSE SERPL-MCNC: 277 MG/DL (ref 65–99)
POTASSIUM SERPL-SCNC: 4.9 MMOL/L (ref 3.5–5.2)
PROT SERPL-MCNC: 6.9 G/DL (ref 6–8.5)
SODIUM SERPL-SCNC: 136 MMOL/L (ref 136–145)

## 2022-06-15 RX ORDER — ASPIRIN 81 MG/1
TABLET, COATED ORAL
Qty: 30 TABLET | Refills: 5 | Status: SHIPPED | OUTPATIENT
Start: 2022-06-15

## 2022-08-11 ENCOUNTER — OFFICE VISIT (OUTPATIENT)
Dept: PODIATRY | Facility: CLINIC | Age: 62
End: 2022-08-11

## 2022-08-11 VITALS
OXYGEN SATURATION: 99 % | BODY MASS INDEX: 43.4 KG/M2 | TEMPERATURE: 97.3 F | DIASTOLIC BLOOD PRESSURE: 67 MMHG | SYSTOLIC BLOOD PRESSURE: 154 MMHG | HEIGHT: 69 IN | WEIGHT: 293 LBS | HEART RATE: 77 BPM

## 2022-08-11 DIAGNOSIS — Z79.4 TYPE 2 DIABETES MELLITUS WITH DIABETIC POLYNEUROPATHY, WITH LONG-TERM CURRENT USE OF INSULIN: Primary | ICD-10-CM

## 2022-08-11 DIAGNOSIS — E11.42 TYPE 2 DIABETES MELLITUS WITH DIABETIC POLYNEUROPATHY, WITH LONG-TERM CURRENT USE OF INSULIN: Primary | ICD-10-CM

## 2022-08-11 PROCEDURE — 99203 OFFICE O/P NEW LOW 30 MIN: CPT | Performed by: PODIATRIST

## 2022-08-11 PROCEDURE — G8404 LOW EXTEMITY NEUR EXAM DOCUM: HCPCS | Performed by: PODIATRIST

## 2022-08-11 NOTE — PROGRESS NOTES
[facility] - PODIATRY    Today's Date: 22    Patient Name: Ifrah Robertson  MRN: [id number]  CSN: [id number]  PCP: Rashid Acosta DO, Last PCP Visit:  2022  Referring Provider: No ref. provider found    SUBJECTIVE     Chief Complaint   Patient presents with   • Left Foot - Establish Care, Annual Exam, Diabetes   • Right Foot - Establish Care, Annual Exam, Diabetes     HPI: Ifrah Robertson, a 61 y.o.female, presents to clinic for a diabetic foot evaluation.    New, Established, New Problem:  new    Onset: Insidious    Nature:  IDDM    Stable, worsening, improving:  Poorly controlled    Patient controlling diabetes via:  insulin    Patient states there last blood glucose was:  170    Patient denies any fevers, chills, nausea, vomiting, shortness of breath, nor any other constitutional signs nor symptoms.    No other pedal complaints at this time.    Past Medical History:   Diagnosis Date   • Anemia    • Arnold-Chiari deformity (HCC)    • Diabetes mellitus (HCC)     Dr. Cameron manages   • Obesity    • Smoker    • Stroke (HCC) 2018    no deficits, baby ASA only, hx of blood thinners 2018   • Uterine cancer (HCC)    • Visual impairment      Past Surgical History:   Procedure Laterality Date   • ANUS SURGERY      blood clot   • BRAIN SURGERY      shunt x3, and decompression   • CARPAL TUNNEL RELEASE Left    •  SECTION     • FOOT SURGERY Right    • THYROID SURGERY       Family History   Problem Relation Age of Onset   • Stroke Father    • Breast cancer Mother    • Heart disease Mother    • Cancer Mother    • Uterine cancer Neg Hx    • Colon cancer Neg Hx    • Ovarian cancer Neg Hx      Social History     Socioeconomic History   • Marital status:    Tobacco Use   • Smoking status: Current Every Day Smoker     Packs/day: 1.00     Years: 41.00     Pack years: 41.00     Types: Cigarettes     Start date:    • Smokeless tobacco: Never Used   Vaping Use   • Vaping Use: Former   •  Substances: Nicotine, Flavoring   Substance and Sexual Activity   • Alcohol use: Never   • Drug use: Never   • Sexual activity: Defer     Allergies   Allergen Reactions   • Tetracycline Hcl Unknown - High Severity     INFECTED BLOOD   • Azithromycin Hives   • Cefaclor Unknown - Low Severity   • Gluten Meal Other (See Comments)   • Hydrocodone-Acetaminophen Unknown - Low Severity   • Indomethacin Unknown - Low Severity   • Ketorolac Tromethamine Unknown - Low Severity   • Latex Unknown - Low Severity   • Trazodone Dizziness   • Valacyclovir Hcl Unknown - Low Severity     Current Outpatient Medications   Medication Sig Dispense Refill   • aspirin 81 MG chewable tablet Chew 81 mg Daily.     • Aspirin Low Dose 81 MG EC tablet TAKE ONE TABLET BY MOUTH ONCE DAILY 30 tablet 5   • Blood Glucose Monitoring Suppl device Use to monitor sugar 5x daily before and after meals and bedtime for uncontrolled insulin dependent diabetes E11.9 6 each 3   • cetirizine (zyrTEC) 10 MG tablet Take 10 mg by mouth Daily.     • Continuous Blood Gluc Sensor (FreeStyle Risa 2 Sensor) misc 1 each Every 14 (Fourteen) Days. 2 each 5   • escitalopram (LEXAPRO) 20 MG tablet Take 1 tablet by mouth Daily. 90 tablet 3   • ferrous sulfate 325 (65 FE) MG tablet Take 1 tablet by mouth Daily.     • fluticasone (Flonase) 50 MCG/ACT nasal spray 2 sprays into the nostril(s) as directed by provider Daily. 1 g 5   • Insulin Pen Needle (Pen Needles) 31G X 8 MM misc Use to inject insulin up to 5 times daily, T2DM e11.9 200 each 11   • levothyroxine (SYNTHROID, LEVOTHROID) 200 MCG tablet Take 1 tablet by mouth Daily for 90 days. 90 tablet 3   • meclizine (ANTIVERT) 25 MG tablet Take 1 tablet by mouth 3 (Three) Times a Day As Needed for Dizziness. 30 tablet 0   • metFORMIN ER (GLUCOPHAGE-XR) 500 MG 24 hr tablet 500 mg. 3 tablets once daily     • metoprolol succinate XL (TOPROL-XL) 50 MG 24 hr tablet Take 1 tablet by mouth Daily for 90 days. 90 tablet 3   •  pantoprazole (PROTONIX) 40 MG EC tablet      • Rybelsus 7 MG tablet Take 7 mg by mouth Daily. 90 tablet 3   • Tresiba FlexTouch 200 UNIT/ML solution pen-injector pen injection Inject 48 units daily, max 60 units for t2dm e11.9 9 pen 3   • vitamin B-12 (CYANOCOBALAMIN) 1000 MCG tablet Take 1,000 mcg by mouth Daily. Pt takes 2 tablets at night     • vitamin D (ERGOCALCIFEROL) 1.25 MG (59911 UT) capsule capsule Take 1 capsule by mouth 1 (One) Time Per Week. 12 capsule 3     No current facility-administered medications for this visit.     Review of Systems   Constitutional: Negative.    All other systems reviewed and are negative.      OBJECTIVE     Vitals:    08/11/22 1338   BP: 154/67   Pulse: 77   Temp: 97.3 °F (36.3 °C)   SpO2: 99%       Body mass index is 46.75 kg/m².    Lab Results   Component Value Date    HGBA1C 10.40 (H) 06/14/2022       Lab Results   Component Value Date    GLUCOSE 277 (H) 06/14/2022    CALCIUM 8.9 06/14/2022     06/14/2022    K 4.9 06/14/2022    CO2 25.0 06/14/2022    CL 98 06/14/2022    BUN 10 06/14/2022    CREATININE 0.82 06/14/2022    EGFRIFNONA 84 02/18/2022    BCR 12.2 06/14/2022    ANIONGAP 13.0 06/14/2022       Patient seen in no apparent distress.      PHYSICAL EXAM:     Foot/Ankle Exam:       General:   Diabetic Foot Exam Performed    Appearance: obesity    Orientation: AAOx3    Affect: appropriate    Gait: unimpaired    Shoe Gear:  Sandals    VASCULAR      Right Foot Vascularity   Normal vascular exam    Dorsalis pedis:  1+  Posterior tibial:  1+  Skin Temperature: cool    Edema Grading:  None  CFT:  < 3 seconds  Pedal Hair Growth:  Absent  Varicosities: mild varicosities       Left Foot Vascularity   Normal vascular exam    Dorsalis pedis:  1+  Posterior tibial:  1+  Skin Temperature: cool    Edema Grading:  None  CFT:  < 3 seconds  Pedal Hair Growth:  Absent  Varicosities: mild varicosities        NEUROLOGIC     Right Foot Neurologic   Normal sensation    Light touch  sensation:  Normal  Vibratory sensation:  Normal  Hot/Cold sensation: normal    Protective Sensation using Bomont-Warren Monofilament:  10     Left Foot Neurologic   Normal sensation    Light touch sensation:  Normal  Vibratory sensation:  Normal  Hot/cold sensation: normal    Protective Sensation using Bomont-Warren Monofilament:  10     MUSCLE STRENGTH     Right Foot Muscle Strength   Foot dorsiflexion:  4  Foot plantar flexion:  4  Foot inversion:  4  Foot eversion:  4     Left Foot Muscle Strength   Foot dorsiflexion:  4  Foot plantar flexion:  4  Foot inversion:  4  Foot eversion:  4     RANGE OF MOTION      Right Foot Range of Motion   Foot and ankle ROM within normal limits       Left Foot Range of Motion   Foot and ankle ROM within normal limits       DERMATOLOGIC     Right Foot Dermatologic   Skin: skin intact    Nails: normal       Left Foot Dermatologic   Skin: skin intact    Nails: normal        Diabetic Foot Exam Performed      ASSESSMENT/PLAN     Diagnoses and all orders for this visit:    1. Type 2 diabetes mellitus with diabetic polyneuropathy, with long-term current use of insulin (HCC) (Primary)        Comprehensive lower extremity examination and evaluation was performed.    Discussed findings and treatment plan including risks, benefits, and treatment options with patient in detail. Patient agreed with treatment plan.    Medications and allergies reviewed.  Reviewed available blood glucose and HgB A1C lab values along with other pertinent labs.  These were discussed with the patient as to their importance of diabetic maintenance.    Diabetic foot exam performed and documented this date, compliant with CQM required standards. Detail of findings as noted in physical exam.  Lower extremity Neurologic exam for diabetic patient performed and documented this date, compliant with PQRS required standards. Detail of findings as noted in physical exam.  Advised patient importance of good routine  lower extremity hygiene. Advised patient importance of evaluating for intact skin and pain free nail borders.  Advised patient to use mirror to evaluate plantar/ soles of feet for better visualization. Advised patient monitor and phone office to be seen if any cracking to skin, open lesions, painful nail borders or if nails become elongated prior to next visit. Advised patient importance of daily cleansing of lower extremities, followed by good skin cream to maintain normal hydration of skin. Also advised patient importance of close daily monitoring of blood sugar. Advised to regulate diet and medications to maintain control of blood sugar in optimal range. Contact primary care provider if difficulties maintaining blood sugar levels.  Advised Patient of presence of Diabetes Mellitus condition.  Advised Patient risk of progression and worsening or improvement, then return of condition.  Will monitor condition for any change in future. Treat with most appropriate treatment pending status of condition.  Counseled and advised patient extensively on nature and ramifications of diabetes. Standard instructions given to patient for good diabetic foot care and maintenance. Advised importance of careful monitoring to avoid break down and complications secondary to diabetes. Advised patient importance of strict maintenance of blood sugar control. Advised patient of possible ominous results from neglect of condition, i.e.: amputation/ loss of digits, feet and legs, or even death.  Patient states understands counseling, will monitor closely, continue good hygiene and routine diabetic foot care. Patient will contact office is questions or problems.      An After Visit Summary was printed and given to the patient at discharge, including (if requested) any available informative/educational handouts regarding diagnosis, treatment, or medications. All questions were answered to patient/family satisfaction. Should symptoms fail to  improve or worsen they agree to call or return to clinic or to go to the Emergency Department. Discussed the importance of following up with any needed screening tests/labs/specialist appointments and any requested follow-up recommended by me today. Importance of maintaining follow-up discussed and patient accepts that missed appointments can delay diagnosis and potentially lead to worsening of conditions.    Return in about 1 year (around 8/11/2023) for Podiatry Diabetic Foot Exam., or sooner if acute issues arise.    This document has been electronically signed by Bk Wood DPM on August 11, 2022 14:09 EDT

## 2022-11-14 RX ORDER — ESCITALOPRAM OXALATE 20 MG/1
TABLET ORAL
Qty: 90 TABLET | Refills: 5 | Status: SHIPPED | OUTPATIENT
Start: 2022-11-14

## 2022-11-29 RX ORDER — PANTOPRAZOLE SODIUM 40 MG/1
TABLET, DELAYED RELEASE ORAL
Qty: 90 TABLET | Refills: 3 | Status: SHIPPED | OUTPATIENT
Start: 2022-11-29

## 2022-12-11 ENCOUNTER — HOSPITAL ENCOUNTER (EMERGENCY)
Facility: HOSPITAL | Age: 62
Discharge: HOME | End: 2022-12-11
Attending: EMERGENCY MEDICINE | Admitting: EMERGENCY MEDICINE

## 2022-12-11 VITALS
SYSTOLIC BLOOD PRESSURE: 119 MMHG | HEIGHT: 69 IN | DIASTOLIC BLOOD PRESSURE: 61 MMHG | WEIGHT: 293 LBS | BODY MASS INDEX: 43.4 KG/M2 | HEART RATE: 94 BPM | TEMPERATURE: 98.2 F | RESPIRATION RATE: 24 BRPM | OXYGEN SATURATION: 93 %

## 2022-12-11 DIAGNOSIS — T78.2XXA ACUTE ANAPHYLAXIS, INITIAL ENCOUNTER: Primary | ICD-10-CM

## 2022-12-11 LAB
ALBUMIN SERPL-MCNC: 3.4 G/DL (ref 3.5–5.2)
ALBUMIN/GLOB SERPL: 1.2 G/DL
ALP SERPL-CCNC: 55 U/L (ref 39–117)
ALT SERPL W P-5'-P-CCNC: 8 U/L (ref 1–33)
ANION GAP SERPL CALCULATED.3IONS-SCNC: 12.9 MMOL/L (ref 5–15)
AST SERPL-CCNC: 12 U/L (ref 1–32)
BASOPHILS # BLD AUTO: 0.12 10*3/MM3 (ref 0–0.2)
BASOPHILS NFR BLD AUTO: 0.5 % (ref 0–1.5)
BILIRUB SERPL-MCNC: 0.5 MG/DL (ref 0–1.2)
BUN SERPL-MCNC: 13 MG/DL (ref 8–23)
BUN/CREAT SERPL: 16.7 (ref 7–25)
CALCIUM SPEC-SCNC: 8.2 MG/DL (ref 8.6–10.5)
CHLORIDE SERPL-SCNC: 98 MMOL/L (ref 98–107)
CO2 SERPL-SCNC: 21.1 MMOL/L (ref 22–29)
CREAT SERPL-MCNC: 0.78 MG/DL (ref 0.57–1)
DEPRECATED RDW RBC AUTO: 44.8 FL (ref 37–54)
EGFRCR SERPLBLD CKD-EPI 2021: 86 ML/MIN/1.73
EOSINOPHIL # BLD AUTO: 0.14 10*3/MM3 (ref 0–0.4)
EOSINOPHIL NFR BLD AUTO: 0.6 % (ref 0.3–6.2)
ERYTHROCYTE [DISTWIDTH] IN BLOOD BY AUTOMATED COUNT: 16.6 % (ref 12.3–15.4)
GLOBULIN UR ELPH-MCNC: 2.8 GM/DL
GLUCOSE SERPL-MCNC: 307 MG/DL (ref 65–99)
HCT VFR BLD AUTO: 38.5 % (ref 34–46.6)
HGB BLD-MCNC: 12.1 G/DL (ref 12–15.9)
HOLD SPECIMEN: NORMAL
HOLD SPECIMEN: NORMAL
IMM GRANULOCYTES # BLD AUTO: 0.2 10*3/MM3 (ref 0–0.05)
IMM GRANULOCYTES NFR BLD AUTO: 0.9 % (ref 0–0.5)
LYMPHOCYTES # BLD AUTO: 2.09 10*3/MM3 (ref 0.7–3.1)
LYMPHOCYTES NFR BLD AUTO: 9.2 % (ref 19.6–45.3)
MCH RBC QN AUTO: 23.8 PG (ref 26.6–33)
MCHC RBC AUTO-ENTMCNC: 31.4 G/DL (ref 31.5–35.7)
MCV RBC AUTO: 75.8 FL (ref 79–97)
MONOCYTES # BLD AUTO: 1.51 10*3/MM3 (ref 0.1–0.9)
MONOCYTES NFR BLD AUTO: 6.7 % (ref 5–12)
NEUTROPHILS NFR BLD AUTO: 18.57 10*3/MM3 (ref 1.7–7)
NEUTROPHILS NFR BLD AUTO: 82.1 % (ref 42.7–76)
NRBC BLD AUTO-RTO: 0 /100 WBC (ref 0–0.2)
PLATELET # BLD AUTO: 440 10*3/MM3 (ref 140–450)
PMV BLD AUTO: 9.9 FL (ref 6–12)
POTASSIUM SERPL-SCNC: 4 MMOL/L (ref 3.5–5.2)
PROT SERPL-MCNC: 6.2 G/DL (ref 6–8.5)
QT INTERVAL: 371 MS
RBC # BLD AUTO: 5.08 10*6/MM3 (ref 3.77–5.28)
SODIUM SERPL-SCNC: 132 MMOL/L (ref 136–145)
WBC NRBC COR # BLD: 22.63 10*3/MM3 (ref 3.4–10.8)
WHOLE BLOOD HOLD COAG: NORMAL
WHOLE BLOOD HOLD SPECIMEN: NORMAL

## 2022-12-11 PROCEDURE — 99284 EMERGENCY DEPT VISIT MOD MDM: CPT

## 2022-12-11 PROCEDURE — 93010 ELECTROCARDIOGRAM REPORT: CPT | Performed by: INTERNAL MEDICINE

## 2022-12-11 PROCEDURE — 94799 UNLISTED PULMONARY SVC/PX: CPT

## 2022-12-11 PROCEDURE — 85025 COMPLETE CBC W/AUTO DIFF WBC: CPT | Performed by: EMERGENCY MEDICINE

## 2022-12-11 PROCEDURE — 93005 ELECTROCARDIOGRAM TRACING: CPT | Performed by: EMERGENCY MEDICINE

## 2022-12-11 PROCEDURE — 96374 THER/PROPH/DIAG INJ IV PUSH: CPT

## 2022-12-11 PROCEDURE — 94640 AIRWAY INHALATION TREATMENT: CPT

## 2022-12-11 PROCEDURE — 80053 COMPREHEN METABOLIC PANEL: CPT | Performed by: EMERGENCY MEDICINE

## 2022-12-11 RX ORDER — PREDNISONE 20 MG/1
40 TABLET ORAL DAILY
Qty: 6 TABLET | Refills: 0 | Status: SHIPPED | OUTPATIENT
Start: 2022-12-11 | End: 2022-12-14

## 2022-12-11 RX ORDER — IPRATROPIUM BROMIDE AND ALBUTEROL SULFATE 2.5; .5 MG/3ML; MG/3ML
SOLUTION RESPIRATORY (INHALATION)
Status: COMPLETED
Start: 2022-12-11 | End: 2022-12-11

## 2022-12-11 RX ORDER — IPRATROPIUM BROMIDE AND ALBUTEROL SULFATE 2.5; .5 MG/3ML; MG/3ML
3 SOLUTION RESPIRATORY (INHALATION)
Status: COMPLETED | OUTPATIENT
Start: 2022-12-11 | End: 2022-12-11

## 2022-12-11 RX ORDER — EPINEPHRINE 0.3 MG/.3ML
0.3 INJECTION SUBCUTANEOUS ONCE
Qty: 1 EACH | Refills: 0 | Status: SHIPPED | OUTPATIENT
Start: 2022-12-11 | End: 2022-12-11

## 2022-12-11 RX ORDER — FAMOTIDINE 10 MG/ML
20 INJECTION, SOLUTION INTRAVENOUS ONCE
Status: COMPLETED | OUTPATIENT
Start: 2022-12-11 | End: 2022-12-11

## 2022-12-11 RX ADMIN — IPRATROPIUM BROMIDE AND ALBUTEROL SULFATE 3 ML: .5; 3 SOLUTION RESPIRATORY (INHALATION) at 03:01

## 2022-12-11 RX ADMIN — IPRATROPIUM BROMIDE AND ALBUTEROL SULFATE 3 ML: .5; 3 SOLUTION RESPIRATORY (INHALATION) at 03:02

## 2022-12-11 RX ADMIN — SODIUM CHLORIDE 1000 ML: 9 INJECTION, SOLUTION INTRAVENOUS at 02:57

## 2022-12-11 RX ADMIN — IPRATROPIUM BROMIDE AND ALBUTEROL SULFATE 3 ML: .5; 3 SOLUTION RESPIRATORY (INHALATION) at 03:04

## 2022-12-11 RX ADMIN — FAMOTIDINE 20 MG: 10 INJECTION INTRAVENOUS at 05:40

## 2022-12-11 NOTE — ED NOTES
Pt to ED from home per [facility] with possible allergic reaction to crab rangherson.      EMS reports pt covered in hives, wheezing, having facial edema upon arrival to home.      Per EMS pt received 0.3mg epi, 125mg solumedrol, 50mg benadryl and IVF.      Initial pressure 80 systolic, increased to 120 systolic upon arrival to ED.

## 2022-12-11 NOTE — ED PROVIDER NOTES
Time: 2:46 AM EST    Chief Complaint: Allergic Reaction    History of Present Illness:      Patient is a 62 y.o. year old female that presents to the emergency department with an allergic reaction. EMS reports that benadryll was given to pt. Pt is in anaphylactic shock with airways closing off. EMS reports bilateral wheezing, face swelling, edema, and hives throughout the body.   initial BP was 80/30 went up to the 120's systolic          History provided by:  EMS personnel  History limited by:  Acuity of condition   used: No        Similar Symptoms Previously: N/A  Recently seen: 22      Patient Care Team  Primary Care Provider: Rashid Acosta DO    Past Medical History:     Allergies   Allergen Reactions   • Tetracycline Hcl Unknown - High Severity     INFECTED BLOOD   • Azithromycin Hives   • Cefaclor Unknown - Low Severity   • Gluten Meal Other (See Comments)   • Hydrocodone-Acetaminophen Unknown - Low Severity   • Indomethacin Unknown - Low Severity   • Ketorolac Tromethamine Unknown - Low Severity   • Latex Unknown - Low Severity   • Trazodone Dizziness   • Valacyclovir Hcl Unknown - Low Severity     Past Medical History:   Diagnosis Date   • Anemia    • Arnold-Chiari deformity (HCC)    • Diabetes mellitus (HCC)     Dr. Cameron manages   • Obesity    • Smoker    • Stroke (HCC) 2018    no deficits, baby ASA only, hx of blood thinners 2018   • Uterine cancer (HCC)    • Visual impairment      Past Surgical History:   Procedure Laterality Date   • ANUS SURGERY      blood clot   • BRAIN SURGERY      shunt x3, and decompression   • CARPAL TUNNEL RELEASE Left    •  SECTION     • FOOT SURGERY Right    • THYROID SURGERY       Family History   Problem Relation Age of Onset   • Stroke Father    • Breast cancer Mother    • Heart disease Mother    • Cancer Mother    • Uterine cancer Neg Hx    • Colon cancer Neg Hx    • Ovarian cancer Neg Hx        Home Medications:  Prior to Admission  medications    Medication Sig Start Date End Date Taking? Authorizing Provider   aspirin 81 MG chewable tablet Chew 81 mg Daily.    ProviderChas MD   Aspirin Low Dose 81 MG EC tablet TAKE ONE TABLET BY MOUTH ONCE DAILY 6/15/22   Keerthi Herrera APRN   Blood Glucose Monitoring Suppl device Use to monitor sugar 5x daily before and after meals and bedtime for uncontrolled insulin dependent diabetes E11.9 5/18/22 5/18/23  Rashid Acosta DO   cetirizine (zyrTEC) 10 MG tablet Take 10 mg by mouth Daily.    ProviderChas MD   Continuous Blood Gluc Sensor (FreeStyle Risa 2 Sensor) misc 1 each Every 14 (Fourteen) Days. 6/14/22   Rashid Acosta DO   escitalopram (LEXAPRO) 20 MG tablet TAKE ONE TABLET BY MOUTH ONCE DAILY 11/14/22   Keerthi Herrera APRN   ferrous sulfate 325 (65 FE) MG tablet Take 1 tablet by mouth Daily.    ProviderChas MD   fluticasone (Flonase) 50 MCG/ACT nasal spray 2 sprays into the nostril(s) as directed by provider Daily. 6/14/22   Keerthi Herrera APRN   Insulin Pen Needle (Pen Needles) 31G X 8 MM misc Use to inject insulin up to 5 times daily, T2DM e11.9 10/18/21   Rashid Acosta DO   levothyroxine (SYNTHROID, LEVOTHROID) 200 MCG tablet Take 1 tablet by mouth Daily for 90 days. 11/1/21 2/21/22  Rashid Acosta DO   meclizine (ANTIVERT) 25 MG tablet Take 1 tablet by mouth 3 (Three) Times a Day As Needed for Dizziness. 6/14/22   Keerthi Herrera APRN   metFORMIN ER (GLUCOPHAGE-XR) 500 MG 24 hr tablet 500 mg. 3 tablets once daily 4/6/22   ProviderChas MD   metoprolol succinate XL (TOPROL-XL) 50 MG 24 hr tablet Take 1 tablet by mouth Daily for 90 days. 11/1/21 2/21/22  Rashid Acosta DO   pantoprazole (PROTONIX) 40 MG EC tablet TAKE ONE TABLET BY MOUTH ONCE DAILY 11/29/22   Rashid Acosta DO   Rybelsus 7 MG tablet Take 7 mg by mouth Daily. 6/14/22   Keerthi Herrera APRN Tresiba FlexTouch 200 UNIT/ML solution pen-injector pen  "injection Inject 48 units daily, max 60 units for t2dm e11.9 3/10/22   Rashid Acosta DO   vitamin B-12 (CYANOCOBALAMIN) 1000 MCG tablet Take 1,000 mcg by mouth Daily. Pt takes 2 tablets at night    Provider, MD Chas   vitamin D (ERGOCALCIFEROL) 1.25 MG (98321 UT) capsule capsule Take 1 capsule by mouth 1 (One) Time Per Week. 2/21/22   Rashid Acosta DO        Social History:   Social History     Tobacco Use   • Smoking status: Every Day     Packs/day: 1.00     Years: 41.00     Pack years: 41.00     Types: Cigarettes     Start date: 1980   • Smokeless tobacco: Never   Vaping Use   • Vaping Use: Former   • Substances: Nicotine, Flavoring   Substance Use Topics   • Alcohol use: Never   • Drug use: Never       Record Review:  I have reviewed the patient's records in [facility].     Review of Systems:  Review of Systems   Unable to perform ROS: Acuity of condition        Physical Exam:  /61   Pulse 94   Temp 98.2 °F (36.8 °C) (Oral)   Resp 24   Ht 174 cm (68.5\")   Wt (!) 137 kg (302 lb 4 oz)   SpO2 93%   BMI 45.29 kg/m²     Physical Exam  Vitals and nursing note reviewed.   Constitutional:       General: She is not in acute distress.     Appearance: Normal appearance. She is not toxic-appearing.   HENT:      Head: Normocephalic and atraumatic.      Jaw: There is normal jaw occlusion.      Comments: Mild face swelling  Eyes:      General: Lids are normal.      Extraocular Movements: Extraocular movements intact.      Conjunctiva/sclera: Conjunctivae normal.      Pupils: Pupils are equal, round, and reactive to light.   Cardiovascular:      Rate and Rhythm: Normal rate and regular rhythm.      Pulses: Normal pulses.      Heart sounds: Normal heart sounds.   Pulmonary:      Effort: Pulmonary effort is normal. No respiratory distress.      Breath sounds: Normal breath sounds. No stridor. No wheezing or rhonchi.      Comments:    Abdominal:      General: Abdomen is flat.      Palpations: Abdomen is soft.      " Tenderness: There is no abdominal tenderness. There is no guarding or rebound.   Musculoskeletal:         General: Normal range of motion.      Cervical back: Normal range of motion and neck supple.      Right lower leg: No edema.      Left lower leg: No edema.   Skin:     General: Skin is warm and dry.      Findings: Erythema (eryythema in face ) present.   Neurological:      Mental Status: She is alert and oriented to person, place, and time. Mental status is at baseline.   Psychiatric:         Mood and Affect: Mood normal.                    Medications in the Emergency Department:  Medications   sodium chloride 0.9 % bolus 1,000 mL (0 mL Intravenous Stopped 12/11/22 0540)   ipratropium-albuterol (DUO-NEB) nebulizer solution 3 mL (3 mL Nebulization Given 12/11/22 0304)   famotidine (PEPCID) injection 20 mg (20 mg Intravenous Given 12/11/22 0540)        Labs  Lab Results (last 24 hours)     Procedure Component Value Units Date/Time    CBC & Differential [[id number]]  (Abnormal) Collected: 12/11/22 0247    Specimen: Blood Updated: 12/11/22 0252    Narrative:      The following orders were created for panel order CBC & Differential.  Procedure                               Abnormality         Status                     ---------                               -----------         ------                     CBC Auto Differential[[id number]]        Abnormal            Final result                 Please view results for these tests on the individual orders.    Comprehensive Metabolic Panel [[id number]]  (Abnormal) Collected: 12/11/22 0247    Specimen: Blood Updated: 12/11/22 0312     Glucose 307 mg/dL      BUN 13 mg/dL      Creatinine 0.78 mg/dL      Sodium 132 mmol/L      Potassium 4.0 mmol/L      Chloride 98 mmol/L      CO2 21.1 mmol/L      Calcium 8.2 mg/dL      Total Protein 6.2 g/dL      Albumin 3.40 g/dL      ALT (SGPT) 8 U/L      AST (SGOT) 12 U/L      Alkaline Phosphatase 55 U/L      Total Bilirubin 0.5 mg/dL       Globulin 2.8 gm/dL      A/G Ratio 1.2 g/dL      BUN/Creatinine Ratio 16.7     Anion Gap 12.9 mmol/L      eGFR 86.0 mL/min/1.73      Comment: National Kidney Foundation and American Society of Nephrology (ASN) Task Force recommended calculation based on the Chronic Kidney Disease Epidemiology Collaboration (CKD-EPI) equation refit without adjustment for race.       Narrative:      GFR Normal >60  Chronic Kidney Disease <60  Kidney Failure <15      CBC Auto Differential [[id number]]  (Abnormal) Collected: 12/11/22 0247    Specimen: Blood Updated: 12/11/22 0252     WBC 22.63 10*3/mm3      RBC 5.08 10*6/mm3      Hemoglobin 12.1 g/dL      Hematocrit 38.5 %      MCV 75.8 fL      MCH 23.8 pg      MCHC 31.4 g/dL      RDW 16.6 %      RDW-SD 44.8 fl      MPV 9.9 fL      Platelets 440 10*3/mm3      Neutrophil % 82.1 %      Lymphocyte % 9.2 %      Monocyte % 6.7 %      Eosinophil % 0.6 %      Basophil % 0.5 %      Immature Grans % 0.9 %      Neutrophils, Absolute 18.57 10*3/mm3      Lymphocytes, Absolute 2.09 10*3/mm3      Monocytes, Absolute 1.51 10*3/mm3      Eosinophils, Absolute 0.14 10*3/mm3      Basophils, Absolute 0.12 10*3/mm3      Immature Grans, Absolute 0.20 10*3/mm3      nRBC 0.0 /100 WBC            Imaging:  No Radiology Exams Resulted Within Past 24 Hours    Procedures:  Procedures    Progress                            Medical Decision Making:  MDM  Number of Diagnoses or Management Options  Acute anaphylaxis, initial encounter  Diagnosis management comments: Patient was treated with nebulizer treatments and additional antihistamines in the emergency department.  She had complete resolution of her facial swelling erythema and hives.  She had no stridor or wheeze.  She feels comfortable plan for discharge home.  We discussed return precautions including worsening symptoms or any additional concerns.    Total Critical Care time of 35  minutes. Total critical care time documented does not include time spent on  separately billed procedures for services of nurses or physician assistants. I personally saw and examined the patient. I have reviewed all diagnostic interpretations and treatment plans as written. I was present for the key portions of any procedures performed and the inclusive time noted in any critical care statement. Critical care time includes patient management by me, time spent at the patients bedside, time to review lab and imaging results, discussing patient care, documentation in the medical record, and time spent with family or caregiver.       Amount and/or Complexity of Data Reviewed  Clinical lab tests: ordered and reviewed  Tests in the medicine section of CPT®: reviewed and ordered         Final diagnoses:   Acute anaphylaxis, initial encounter        Disposition:  ED Disposition     ED Disposition   Discharge    Condition   Stable    Comment   --             Dictated Utilizing Dragon Dictation    Documentation assistance provided by Freeman Vega acting as scribe for Mele Grant MD. Information recorded by the scribe was done at my direction and has been verified and validated by me.          Freeman Vega  12/11/22 0251       Mele Grant MD  12/11/22 075

## 2022-12-21 ENCOUNTER — LAB (OUTPATIENT)
Dept: LAB | Facility: HOSPITAL | Age: 62
End: 2022-12-21

## 2022-12-21 ENCOUNTER — OFFICE VISIT (OUTPATIENT)
Dept: FAMILY MEDICINE CLINIC | Facility: CLINIC | Age: 62
End: 2022-12-21

## 2022-12-21 VITALS
BODY MASS INDEX: 43.4 KG/M2 | HEART RATE: 66 BPM | OXYGEN SATURATION: 100 % | HEIGHT: 69 IN | DIASTOLIC BLOOD PRESSURE: 72 MMHG | SYSTOLIC BLOOD PRESSURE: 153 MMHG | TEMPERATURE: 98.6 F | WEIGHT: 293 LBS

## 2022-12-21 DIAGNOSIS — Z79.4 TYPE 2 DIABETES MELLITUS WITH HYPERGLYCEMIA, WITH LONG-TERM CURRENT USE OF INSULIN: Chronic | ICD-10-CM

## 2022-12-21 DIAGNOSIS — R05.9 COUGH, UNSPECIFIED TYPE: ICD-10-CM

## 2022-12-21 DIAGNOSIS — E03.9 HYPOTHYROIDISM, UNSPECIFIED TYPE: Chronic | ICD-10-CM

## 2022-12-21 DIAGNOSIS — E11.65 TYPE 2 DIABETES MELLITUS WITH HYPERGLYCEMIA, WITH LONG-TERM CURRENT USE OF INSULIN: Chronic | ICD-10-CM

## 2022-12-21 DIAGNOSIS — T78.2XXS ANAPHYLAXIS, SEQUELA: ICD-10-CM

## 2022-12-21 DIAGNOSIS — J41.1 BRONCHITIS, MUCOPURULENT RECURRENT: Primary | ICD-10-CM

## 2022-12-21 DIAGNOSIS — L50.8 OTHER URTICARIA: ICD-10-CM

## 2022-12-21 DIAGNOSIS — F51.01 PRIMARY INSOMNIA: ICD-10-CM

## 2022-12-21 DIAGNOSIS — E66.9 OBESITY (BMI 30-39.9): ICD-10-CM

## 2022-12-21 DIAGNOSIS — D50.8 IRON DEFICIENCY ANEMIA SECONDARY TO INADEQUATE DIETARY IRON INTAKE: ICD-10-CM

## 2022-12-21 LAB
ALBUMIN UR-MCNC: <1.2 MG/DL
CHOLEST SERPL-MCNC: 251 MG/DL (ref 0–200)
CREAT UR-MCNC: 90 MG/DL
EXPIRATION DATE: NORMAL
FERRITIN SERPL-MCNC: 30.3 NG/ML (ref 13–150)
FLUAV AG UPPER RESP QL IA.RAPID: NOT DETECTED
FLUBV AG UPPER RESP QL IA.RAPID: NOT DETECTED
FOLATE SERPL-MCNC: 4.99 NG/ML (ref 4.78–24.2)
HBA1C MFR BLD: 9.7 % (ref 4.8–5.6)
HDLC SERPL-MCNC: 36 MG/DL (ref 40–60)
INTERNAL CONTROL: NORMAL
IRON 24H UR-MRATE: 31 MCG/DL (ref 37–145)
IRON SATN MFR SERPL: 7 % (ref 20–50)
LDLC SERPL CALC-MCNC: 167 MG/DL (ref 0–100)
LDLC/HDLC SERPL: 4.57 {RATIO}
Lab: NORMAL
MICROALBUMIN/CREAT UR: NORMAL MG/G{CREAT}
SARS-COV-2 AG UPPER RESP QL IA.RAPID: NOT DETECTED
T4 FREE SERPL-MCNC: 0.83 NG/DL (ref 0.93–1.7)
TIBC SERPL-MCNC: 471 MCG/DL (ref 298–536)
TRANSFERRIN SERPL-MCNC: 316 MG/DL (ref 200–360)
TRIGL SERPL-MCNC: 253 MG/DL (ref 0–150)
TSH SERPL DL<=0.05 MIU/L-ACNC: 8.33 UIU/ML (ref 0.27–4.2)
VIT B12 BLD-MCNC: 725 PG/ML (ref 211–946)
VLDLC SERPL-MCNC: 48 MG/DL (ref 5–40)

## 2022-12-21 PROCEDURE — 86003 ALLG SPEC IGE CRUDE XTRC EA: CPT

## 2022-12-21 PROCEDURE — 82746 ASSAY OF FOLIC ACID SERUM: CPT

## 2022-12-21 PROCEDURE — 84443 ASSAY THYROID STIM HORMONE: CPT

## 2022-12-21 PROCEDURE — 83540 ASSAY OF IRON: CPT

## 2022-12-21 PROCEDURE — 82728 ASSAY OF FERRITIN: CPT

## 2022-12-21 PROCEDURE — 84466 ASSAY OF TRANSFERRIN: CPT

## 2022-12-21 PROCEDURE — 83036 HEMOGLOBIN GLYCOSYLATED A1C: CPT

## 2022-12-21 PROCEDURE — 99214 OFFICE O/P EST MOD 30 MIN: CPT | Performed by: FAMILY MEDICINE

## 2022-12-21 PROCEDURE — 82043 UR ALBUMIN QUANTITATIVE: CPT

## 2022-12-21 PROCEDURE — 82570 ASSAY OF URINE CREATININE: CPT

## 2022-12-21 PROCEDURE — 36415 COLL VENOUS BLD VENIPUNCTURE: CPT

## 2022-12-21 PROCEDURE — 82607 VITAMIN B-12: CPT

## 2022-12-21 PROCEDURE — 80061 LIPID PANEL: CPT

## 2022-12-21 PROCEDURE — 84439 ASSAY OF FREE THYROXINE: CPT

## 2022-12-21 PROCEDURE — 87428 SARSCOV & INF VIR A&B AG IA: CPT | Performed by: FAMILY MEDICINE

## 2022-12-21 RX ORDER — BENZONATATE 100 MG/1
100 CAPSULE ORAL 3 TIMES DAILY PRN
Qty: 30 CAPSULE | Refills: 0 | Status: SHIPPED | OUTPATIENT
Start: 2022-12-21

## 2022-12-21 RX ORDER — EPINEPHRINE 0.3 MG/.3ML
INJECTION SUBCUTANEOUS
COMMUNITY
Start: 2022-12-12

## 2022-12-21 NOTE — PROGRESS NOTES
"Chief Complaint  Cough and Shortness of Breath    Subjective        Ifrah Robertson presents to [facility]  History of Present Illness  The patient is a 62-year-old female who presents for hospital follow-up.     She presented to emergency department with an allergic reaction resulting in anaphylactic shock and constricted airway. EMS gave Benadryl. Bilateral wheezing and facial swelling was reported. Blood pressure was 80/30 mmHg. She was treated for acute anaphylaxis with nebulizer treatments and additional antihistamines.    The patient states, at the time of her allergic reaction, she was ill and ate hot and sour soup. She denies eating this soup in the past and adds that this is the worst it has ever been. She notes she was given an EpiPen. The patient states she is now afraid to eat anything and is primarily eating fruits and fresh vegetables secondary to this concern. She denies having celiac disease, but she believes she is gluten intolerant as she cannot eat bread or anything with wheat.     The patient adds that she has been ill with bronchitis as well which she was not treated in the hospital. She reports her primary issue is coughing at night which results in a \"coughing spell\" that inhibits her ability to breath and causes her to gag. She states she has not developed bronchitis since living in the local area, but she reports a past history of remedios this every January. The patient states she was given a Combivent inhaler, but she does not like to risk steroids. She denies having a nebulizer at home, but she has been using Diabetic Tussin. She reports she has taken Tessalon Perles for cough in the past, but does not recall if they provide relief.     The patient reports she is getting her diabetes mellitus type 2 under control, but she is due for laboratory studies, per Dr. Cameron. She states Mounjaro has been great. She recalls her last A1c from 08/2022, was " "approximately 8 %.     The patient confirms taking thyroid medication.      She reports she is allergic to CODEINE.    Regarding her recent abnormal vaginal and uterine bleeding, she states she underwent a complete hysterectomy and confirms she is cancer free. Given her recent history of bleeding, she requests to have her iron levels evaluated.     The patient reports she needs to obtain hearing aids as she is losing her hearing and she recently saw ear, nose, and throat.     Objective   Vital Signs:  /72 (BP Location: Left arm, Patient Position: Sitting)   Pulse 66   Temp 98.6 °F (37 °C)   Ht 174 cm (68.5\")   Wt (!) 140 kg (308 lb 6.4 oz)   SpO2 100%   BMI 46.20 kg/m²   Estimated body mass index is 46.2 kg/m² as calculated from the following:    Height as of this encounter: 174 cm (68.5\").    Weight as of this encounter: 140 kg (308 lb 6.4 oz).    Class 3 Severe Obesity (BMI >=40). Obesity-related health conditions include the following: obstructive sleep apnea, hypertension, diabetes mellitus, dyslipidemias, GERD and osteoarthritis. Obesity is improving with treatment. BMI is is above average; BMI management plan is completed. We discussed portion control and increasing exercise.      Physical Exam  Vitals reviewed.   Constitutional:       Appearance: Normal appearance. She is well-developed.   HENT:      Head: Normocephalic and atraumatic.      Right Ear: External ear normal.      Left Ear: External ear normal.      Nose: Nose normal.   Eyes:      Conjunctiva/sclera: Conjunctivae normal.      Pupils: Pupils are equal, round, and reactive to light.   Cardiovascular:      Rate and Rhythm: Normal rate.   Pulmonary:      Effort: Pulmonary effort is normal.      Breath sounds: Normal breath sounds.      Comments: Lungs tight on auscultation.   Abdominal:      General: There is no distension.   Skin:     General: Skin is warm and dry.   Neurological:      General: No focal deficit present.      Mental " Status: She is alert and oriented to person, place, and time.   Psychiatric:         Mood and Affect: Mood and affect normal.         Behavior: Behavior normal.         Thought Content: Thought content normal.         Judgment: Judgment normal.        Result Review :  The following data was reviewed by: Rashid Acosta DO on 12/21/2022:  Common labs    Patient Spotlight 8/25/22 8/25/22 12/11/22 12/11/22 12/11/22 12/11/22 12/11/22 12/11/22 12/11/22 12/11/22 12/11/22 12/11/22 12/11/22 12/11/22 12/11/22 12/11/22 12/11/22 12/11/22 12/21/22 12/21/22 12/21/22 12/21/22    0208 0208 0243 0244 0247 0247 0250 0301 0305 0330 0415 0530 0545 0600 0615 0630 0645 0700 1524 1604 1604 1605   BP    86/71 (A)   132/63 132/82 129/63 100/49 93/50 109/55 117/56 83/51 (A) 111/53 133/49 127/60 119/61 153/72      Weight   137 kg (302 lb 4 oz) (A)                140 kg (308 lb 6.4 oz) (A)      (A) Abnormal value       Common Labs 8/25/22 8/25/22 12/11/22 12/11/22 12/11/22 12/11/22 12/11/22 12/11/22 12/11/22 12/11/22 12/11/22 12/11/22 12/11/22 12/11/22 12/11/22 12/11/22 12/11/22 12/11/22 12/21/22 12/21/22 12/21/22 12/21/22    0208 0208 0243 0244 0247 0247 0250 0301 0305 0330 0415 0530 0545 0600 0615 0630 0645 0700 1524 1604 1604 1605   Glucose      307 (A)                   BUN      13                   Creatinine      0.78                   Sodium      132 (A)                   Potassium      4.0                   Chloride      98                   Calcium      8.2 (A)                   Albumin      3.40 (A)                   Total Bilirubin      0.5                   Alkaline Phosphatase      55                   AST (SGOT)      12                   ALT (SGPT)      8                   WBC     22.63 (A)                    Hemoglobin 11.0 (A)    12.1                    Hematocrit 37.0    38.5                    Platelets     440                    Total Cholesterol                    251 (A)     Triglycerides                    253 (A)     HDL  Cholesterol                    36 (A)     LDL Cholesterol                     167 (A)     Hemoglobin A1C  8.2 (A)                   9.70 (A)    Microalbumin, Urine                      <1.2   (A) Abnormal value       Comments are available for some flowsheets but are not being displayed.           Data reviewed: Recent hospitalization notes from providers managing [facility]          Assessment and Plan   Diagnoses and all orders for this visit:    1. Bronchitis, mucopurulent recurrent (HCC) (Primary)  -     Home Nebulizer  -     Home Nebulizer Accessories  -     benzonatate (Tessalon Perles) 100 MG capsule; Take 1 capsule by mouth 3 (Three) Times a Day As Needed for Cough.  Dispense: 30 capsule; Refill: 0    2. Cough, unspecified type  -     POCT SARS-CoV-2 Antigen BRAN + Flu  -     Hemoglobin A1c; Future  -     Lipid panel; Future  -     Microalbumin / Creatinine Urine Ratio - Urine, Clean Catch; Future  -     TSH+Free T4; Future  -     Iron Profile; Future  -     Ferritin; Future  -     Vitamin B12; Future  -     Folate; Future    3. Anaphylaxis, sequela  -     Food Allergy Profile; Future  -     Hemoglobin A1c; Future  -     Lipid panel; Future  -     Microalbumin / Creatinine Urine Ratio - Urine, Clean Catch; Future  -     TSH+Free T4; Future  -     Iron Profile; Future  -     Ferritin; Future  -     Vitamin B12; Future  -     Folate; Future    4. Other urticaria  -     Food Allergy Profile; Future  -     Hemoglobin A1c; Future  -     Lipid panel; Future  -     Microalbumin / Creatinine Urine Ratio - Urine, Clean Catch; Future  -     TSH+Free T4; Future  -     Iron Profile; Future  -     Ferritin; Future  -     Vitamin B12; Future  -     Folate; Future    5. Type 2 diabetes mellitus with hyperglycemia, with long-term current use of insulin (HCC)  -     Hemoglobin A1c; Future  -     Lipid panel; Future  -     Microalbumin / Creatinine Urine Ratio - Urine, Clean Catch; Future  -     TSH+Free T4; Future  -     Iron  Profile; Future  -     Ferritin; Future  -     Vitamin B12; Future  -     Folate; Future    6. Hypothyroidism, unspecified type  -     Hemoglobin A1c; Future  -     Lipid panel; Future  -     Microalbumin / Creatinine Urine Ratio - Urine, Clean Catch; Future  -     TSH+Free T4; Future  -     Iron Profile; Future  -     Ferritin; Future  -     Vitamin B12; Future  -     Folate; Future    7. Primary insomnia  -     Hemoglobin A1c; Future  -     Lipid panel; Future  -     Microalbumin / Creatinine Urine Ratio - Urine, Clean Catch; Future  -     TSH+Free T4; Future  -     Iron Profile; Future  -     Ferritin; Future  -     Vitamin B12; Future  -     Folate; Future    8. Obesity (BMI 30-39.9)  -     Hemoglobin A1c; Future  -     Lipid panel; Future  -     Microalbumin / Creatinine Urine Ratio - Urine, Clean Catch; Future  -     TSH+Free T4; Future  -     Iron Profile; Future  -     Ferritin; Future  -     Vitamin B12; Future  -     Folate; Future    9. Iron deficiency anemia secondary to inadequate dietary iron intake  -     Iron Profile; Future  -     Ferritin; Future  -     Vitamin B12; Future  -     Folate; Future    Other orders  -     ipratropium-albuterol (COMBIVENT RESPIMAT)  MCG/ACT inhaler; Inhale 1 puff 4 (Four) Times a Day As Needed for Wheezing.  Dispense: 4 g; Refill: 11        1. Bronchitis  - Will prescribe Combivent inhaler to help and continue other medicines as prescribed.    2. Diabetes mellitus type 2  - Advised follow-up with Dr. Cameron regarding her diabetes mellitus type 2. Will send blood glucose laboratory results from today to him as well.    3. Anemia  - Will re-evaluate today via laboratory studies.    4. Abnormal uterine bleeding  - Patient was experiencing significant, abnormal vaginal and uterine bleeding. She underwent a hysterectomy which was all negative which is great as we were concerned about cancer.    5.  Anaphylactic reaction  - Food allergies testing is advised secondary to  her anaphylactic reaction to a gluten-related food item. She will follow up with specialists or get further testing if indicated.    Follow up in 1 month to review results, sooner if indicated.            Follow Up   Return in about 4 weeks (around 1/18/2023), or if symptoms worsen or fail to improve, for Next scheduled follow up, Recheck.  Patient was given instructions and counseling regarding her condition or for health maintenance advice. Please see specific information pulled into the AVS if appropriate.     Transcribed from ambient dictation for Rashid Acosta DO by Mira Franklin.  12/21/22   17:18 EST    Patient or patient representative verbalized consent to the visit recording.  I have personally performed the services described in this document as transcribed by the above individual, and it is both accurate and complete.

## 2022-12-22 RX ORDER — METOPROLOL SUCCINATE 50 MG/1
TABLET, EXTENDED RELEASE ORAL
Qty: 90 TABLET | Refills: 3 | Status: SHIPPED | OUTPATIENT
Start: 2022-12-22

## 2022-12-24 LAB
CLAM IGE QN: <0.1 KU/L
CODFISH IGE QN: <0.1 KU/L
CONV CLASS DESCRIPTION: ABNORMAL
CORN IGE QN: 0.15 KU/L
COW MILK IGE QN: 0.16 KU/L
EGG WHITE IGE QN: 0.12 KU/L
PEANUT IGE QN: 0.23 KU/L
SCALLOP IGE QN: <0.1 KU/L
SESAME SEED IGE QN: 0.2 KU/L
SHRIMP IGE QN: <0.1 KU/L
SOYBEAN IGE QN: <0.1 KU/L
WALNUT IGE QN: <0.1 KU/L
WHEAT IGE QN: 0.14 KU/L

## 2022-12-27 PROBLEM — J41.1 BRONCHITIS, MUCOPURULENT RECURRENT: Status: ACTIVE | Noted: 2022-12-27

## 2023-01-03 ENCOUNTER — PATIENT MESSAGE (OUTPATIENT)
Dept: FAMILY MEDICINE CLINIC | Facility: CLINIC | Age: 63
End: 2023-01-03
Payer: MEDICARE

## 2023-01-05 RX ORDER — PIOGLITAZONEHYDROCHLORIDE 30 MG/1
30 TABLET ORAL DAILY
COMMUNITY

## 2023-01-05 NOTE — TELEPHONE ENCOUNTER
Spoke with patient about results, added new medications prescribed by Dr. Cameron. Patient does not consistently take levothyroxine, let her know if she can, try to take daily to get thyroid levels in range. Patient verbalized understanding

## 2023-01-09 RX ORDER — LEVOTHYROXINE SODIUM 200 MCG
TABLET ORAL
Qty: 90 TABLET | Refills: 5 | Status: SHIPPED | OUTPATIENT
Start: 2023-01-09

## 2023-02-23 RX ORDER — ERGOCALCIFEROL 1.25 MG/1
50000 CAPSULE ORAL WEEKLY
Qty: 12 CAPSULE | Refills: 3 | Status: SHIPPED | OUTPATIENT
Start: 2023-02-23

## 2023-03-07 RX ORDER — METFORMIN HYDROCHLORIDE 500 MG/1
TABLET, EXTENDED RELEASE ORAL
Qty: 120 TABLET | Refills: 5 | Status: SHIPPED | OUTPATIENT
Start: 2023-03-07

## 2023-03-23 ENCOUNTER — TRANSCRIBE ORDERS (OUTPATIENT)
Dept: LAB | Facility: HOSPITAL | Age: 63
End: 2023-03-23
Payer: MEDICARE

## 2023-03-23 ENCOUNTER — LAB (OUTPATIENT)
Dept: LAB | Facility: HOSPITAL | Age: 63
End: 2023-03-23
Payer: MEDICARE

## 2023-03-23 DIAGNOSIS — E11.65 TYPE II DIABETES MELLITUS WITH HYPEROSMOLARITY, UNCONTROLLED: Primary | ICD-10-CM

## 2023-03-23 DIAGNOSIS — E11.00 TYPE II DIABETES MELLITUS WITH HYPEROSMOLARITY, UNCONTROLLED: Primary | ICD-10-CM

## 2023-03-23 DIAGNOSIS — E11.00 TYPE II DIABETES MELLITUS WITH HYPEROSMOLARITY, UNCONTROLLED: ICD-10-CM

## 2023-03-23 DIAGNOSIS — E11.65 TYPE II DIABETES MELLITUS WITH HYPEROSMOLARITY, UNCONTROLLED: ICD-10-CM

## 2023-03-23 LAB — ALBUMIN UR-MCNC: <1.2 MG/DL

## 2023-03-23 PROCEDURE — 36415 COLL VENOUS BLD VENIPUNCTURE: CPT

## 2023-03-23 PROCEDURE — 82043 UR ALBUMIN QUANTITATIVE: CPT

## 2023-03-23 PROCEDURE — 83036 HEMOGLOBIN GLYCOSYLATED A1C: CPT

## 2023-03-24 LAB — HBA1C MFR BLD: 7.2 % (ref 4.8–5.6)

## 2023-05-05 ENCOUNTER — OFFICE VISIT (OUTPATIENT)
Dept: FAMILY MEDICINE CLINIC | Facility: CLINIC | Age: 63
End: 2023-05-05
Payer: MEDICARE

## 2023-05-05 VITALS
BODY MASS INDEX: 44.41 KG/M2 | RESPIRATION RATE: 16 BRPM | OXYGEN SATURATION: 98 % | WEIGHT: 293 LBS | HEIGHT: 68 IN | DIASTOLIC BLOOD PRESSURE: 65 MMHG | TEMPERATURE: 98.4 F | HEART RATE: 82 BPM | SYSTOLIC BLOOD PRESSURE: 136 MMHG

## 2023-05-05 DIAGNOSIS — E66.01 CLASS 3 SEVERE OBESITY DUE TO EXCESS CALORIES WITH SERIOUS COMORBIDITY AND BODY MASS INDEX (BMI) OF 45.0 TO 49.9 IN ADULT: Chronic | ICD-10-CM

## 2023-05-05 DIAGNOSIS — E03.9 ACQUIRED HYPOTHYROIDISM: Chronic | ICD-10-CM

## 2023-05-05 DIAGNOSIS — K04.7 DENTAL ABSCESS: Primary | ICD-10-CM

## 2023-05-05 DIAGNOSIS — I15.2 HYPERTENSION DUE TO ENDOCRINE DISORDER: Chronic | ICD-10-CM

## 2023-05-05 PROBLEM — I63.9 STROKE: Chronic | Status: ACTIVE | Noted: 2021-10-24

## 2023-05-05 PROBLEM — E66.9 OBESITY (BMI 30-39.9): Status: RESOLVED | Noted: 2020-05-21 | Resolved: 2023-05-05

## 2023-05-05 PROBLEM — Z12.31 SCREENING MAMMOGRAM FOR BREAST CANCER: Status: RESOLVED | Noted: 2022-01-03 | Resolved: 2023-05-05

## 2023-05-05 PROBLEM — E66.813 CLASS 3 SEVERE OBESITY DUE TO EXCESS CALORIES WITH SERIOUS COMORBIDITY AND BODY MASS INDEX (BMI) OF 45.0 TO 49.9 IN ADULT: Chronic | Status: ACTIVE | Noted: 2023-05-05

## 2023-05-05 PROBLEM — K21.9 ESOPHAGEAL REFLUX: Chronic | Status: ACTIVE | Noted: 2021-10-18

## 2023-05-05 PROBLEM — F51.01 PRIMARY INSOMNIA: Chronic | Status: ACTIVE | Noted: 2021-10-18

## 2023-05-05 RX ORDER — AMOXICILLIN AND CLAVULANATE POTASSIUM 875; 125 MG/1; MG/1
1 TABLET, FILM COATED ORAL 2 TIMES DAILY
Qty: 20 TABLET | Refills: 0 | Status: SHIPPED | OUTPATIENT
Start: 2023-05-05

## 2023-05-05 NOTE — PROGRESS NOTES
"Chief Complaint  Dental Pain (Tooth infection )    Subjective        Ifrah Robertson presents to [facility]  History of Present Illness  The patient is here today for the management of her acute and chronic medical conditions.  She has seasonal allergies, depression, GERD, hyperlipidemia, hypothyroidism, iron deficiency anemia, primary insomnia, history of CVA, type 2 diabetes that is insulin-dependent and vitamin D deficiency.    She has been having a left molar tooth that is loose for the past couple months.  She has been having swelling under her left jaw for two days. She denies fever or chills.     The patient has no other complaints today and denies chest pain, shortness of breath, weakness, numbness, nausea, vomiting, diarrhea, dizziness or syncopal event.        Objective   Vital Signs:  /65 (BP Location: Left arm)   Pulse 82   Temp 98.4 °F (36.9 °C)   Resp 16   Ht 172.7 cm (68\")   Wt (!) 137 kg (302 lb 8 oz)   SpO2 98%   BMI 45.99 kg/m²   Estimated body mass index is 45.99 kg/m² as calculated from the following:    Height as of this encounter: 172.7 cm (68\").    Weight as of this encounter: 137 kg (302 lb 8 oz).             Physical Exam  Vitals reviewed.   Constitutional:       Appearance: Normal appearance. She is well-developed. She is morbidly obese.   HENT:      Head: Normocephalic and atraumatic.      Right Ear: External ear normal.      Left Ear: External ear normal.      Mouth/Throat:      Pharynx: No oropharyngeal exudate.   Eyes:      Conjunctiva/sclera: Conjunctivae normal.      Pupils: Pupils are equal, round, and reactive to light.   Neck:      Vascular: No carotid bruit.   Cardiovascular:      Rate and Rhythm: Normal rate and regular rhythm.      Heart sounds: No murmur heard.    No friction rub. No gallop.   Pulmonary:      Effort: Pulmonary effort is normal.      Breath sounds: Normal breath sounds. No wheezing or rhonchi.   Abdominal:      " General: There is no distension.   Skin:     General: Skin is warm and dry.   Neurological:      Mental Status: She is alert and oriented to person, place, and time.      Cranial Nerves: No cranial nerve deficit.      Motor: No weakness.   Psychiatric:         Mood and Affect: Mood and affect normal.         Behavior: Behavior normal.         Thought Content: Thought content normal.         Judgment: Judgment normal.        Result Review :    CMP        6/14/2022    15:39 12/11/2022    02:47   CMP   Glucose 277   307     BUN 10   13     Creatinine 0.82   0.78     EGFR 81.5   86.0     Sodium 136   132     Potassium 4.9   4.0     Chloride 98   98     Calcium 8.9   8.2     Total Protein 6.9   6.2     Albumin 3.90   3.40     Globulin 3.0   2.8     Total Bilirubin 0.3   0.5     Alkaline Phosphatase 80   55     AST (SGOT) 11   12     ALT (SGPT) 10   8     Albumin/Globulin Ratio 1.3   1.2     BUN/Creatinine Ratio 12.2   16.7     Anion Gap 13.0   12.9       CBC        6/14/2022    15:39 8/25/2022    02:08 12/11/2022    02:47   CBC   WBC 16.93    22.63     RBC 5.41    5.08     Hemoglobin 13.6   11.0      12.1     Hematocrit 43.6   37.0      38.5     MCV 80.6    75.8     MCH 25.1    23.8     MCHC 31.2    31.4     RDW 15.1    16.6     Platelets 396    440         This result is from an external source.     Lipid Panel        12/21/2022    16:04   Lipid Panel   Total Cholesterol 251     Triglycerides 253     HDL Cholesterol 36     VLDL Cholesterol 48     LDL Cholesterol  167     LDL/HDL Ratio 4.57       TSH        12/21/2022    16:04   TSH   TSH 8.330                    Assessment and Plan   Diagnoses and all orders for this visit:    1. Dental abscess (Primary)  Comments:  She was encouraged to follow-up with a dentist. She was given a prescription of Augmentin to taken as directed.   Orders:  -     amoxicillin-clavulanate (Augmentin) 875-125 MG per tablet; Take 1 tablet by mouth 2 (Two) Times a Day.  Dispense: 20 tablet;  Refill: 0    2. Hypertension due to endocrine disorder  Assessment & Plan:  Hypertension is improving with treatment.  Continue current treatment regimen.  Blood pressure will be reassessed at the next regular appointment.      3. Class 3 severe obesity due to excess calories with serious comorbidity and body mass index (BMI) of 45.0 to 49.9 in adult  Assessment & Plan:  Patient's (Body mass index is 45.99 kg/m².) indicates that they are morbidly/severely obese (BMI > 40 or > 35 with obesity - related health condition) with health conditions that include hypertension, diabetes mellitus and dyslipidemias . Weight is improving with lifestyle modifications. BMI  is above average; BMI management plan is completed. We discussed low calorie, low carb based diet program, portion control and increasing exercise.       4. Acquired hypothyroidism  -     TSH+Free T4; Future           Follow Up   Return in about 2 weeks (around 5/19/2023).  Patient was given instructions and counseling regarding her condition or for health maintenance advice. Please see specific information pulled into the AVS if appropriate.

## 2023-05-05 NOTE — ASSESSMENT & PLAN NOTE
Patient's (Body mass index is 45.99 kg/m².) indicates that they are morbidly/severely obese (BMI > 40 or > 35 with obesity - related health condition) with health conditions that include hypertension, diabetes mellitus and dyslipidemias . Weight is improving with lifestyle modifications. BMI  is above average; BMI management plan is completed. We discussed low calorie, low carb based diet program, portion control and increasing exercise.

## 2023-05-11 ENCOUNTER — OFFICE VISIT (OUTPATIENT)
Dept: FAMILY MEDICINE CLINIC | Facility: CLINIC | Age: 63
End: 2023-05-11

## 2023-05-11 VITALS
HEIGHT: 68 IN | DIASTOLIC BLOOD PRESSURE: 79 MMHG | RESPIRATION RATE: 14 BRPM | SYSTOLIC BLOOD PRESSURE: 145 MMHG | OXYGEN SATURATION: 95 % | HEART RATE: 80 BPM | TEMPERATURE: 98.9 F | BODY MASS INDEX: 44.41 KG/M2 | WEIGHT: 293 LBS

## 2023-05-11 DIAGNOSIS — Z80.3 FAMILY HISTORY OF BREAST CANCER: ICD-10-CM

## 2023-05-11 DIAGNOSIS — E55.9 VITAMIN D DEFICIENCY: ICD-10-CM

## 2023-05-11 DIAGNOSIS — E11.65 TYPE 2 DIABETES MELLITUS WITH HYPERGLYCEMIA, WITH LONG-TERM CURRENT USE OF INSULIN: ICD-10-CM

## 2023-05-11 DIAGNOSIS — Z12.31 SCREENING MAMMOGRAM FOR BREAST CANCER: ICD-10-CM

## 2023-05-11 DIAGNOSIS — Z79.4 TYPE 2 DIABETES MELLITUS WITH HYPERGLYCEMIA, WITH LONG-TERM CURRENT USE OF INSULIN: ICD-10-CM

## 2023-05-11 DIAGNOSIS — E78.2 DYSLIPIDEMIA WITH LOW HIGH DENSITY LIPOPROTEIN (HDL) CHOLESTEROL WITH HYPERTRIGLYCERIDEMIA DUE TO TYPE 2 DIABETES MELLITUS: ICD-10-CM

## 2023-05-11 DIAGNOSIS — Z79.4 INSULIN-REQUIRING OR DEPENDENT TYPE II DIABETES MELLITUS: ICD-10-CM

## 2023-05-11 DIAGNOSIS — E66.01 CLASS 3 SEVERE OBESITY DUE TO EXCESS CALORIES WITH SERIOUS COMORBIDITY AND BODY MASS INDEX (BMI) OF 45.0 TO 49.9 IN ADULT: ICD-10-CM

## 2023-05-11 DIAGNOSIS — E11.69 DYSLIPIDEMIA WITH LOW HIGH DENSITY LIPOPROTEIN (HDL) CHOLESTEROL WITH HYPERTRIGLYCERIDEMIA DUE TO TYPE 2 DIABETES MELLITUS: ICD-10-CM

## 2023-05-11 DIAGNOSIS — Z00.00 ENCOUNTER FOR SUBSEQUENT ANNUAL WELLNESS VISIT (AWV) IN MEDICARE PATIENT: Primary | ICD-10-CM

## 2023-05-11 DIAGNOSIS — E11.9 INSULIN-REQUIRING OR DEPENDENT TYPE II DIABETES MELLITUS: ICD-10-CM

## 2023-05-11 DIAGNOSIS — I15.2 HYPERTENSION DUE TO ENDOCRINE DISORDER: ICD-10-CM

## 2023-05-11 DIAGNOSIS — J30.9 ALLERGIC RHINITIS, UNSPECIFIED SEASONALITY, UNSPECIFIED TRIGGER: ICD-10-CM

## 2023-05-11 DIAGNOSIS — E03.9 HYPOTHYROIDISM, UNSPECIFIED TYPE: ICD-10-CM

## 2023-05-11 RX ORDER — ESCITALOPRAM OXALATE 20 MG/1
20 TABLET ORAL DAILY
Qty: 90 TABLET | Refills: 3 | Status: SHIPPED | OUTPATIENT
Start: 2023-05-11

## 2023-05-11 RX ORDER — METOPROLOL SUCCINATE 50 MG/1
50 TABLET, EXTENDED RELEASE ORAL DAILY
Qty: 90 TABLET | Refills: 3 | Status: SHIPPED | OUTPATIENT
Start: 2023-05-11

## 2023-05-11 RX ORDER — LEVOTHYROXINE SODIUM 200 MCG
200 TABLET ORAL DAILY
Qty: 90 TABLET | Refills: 3 | Status: SHIPPED | OUTPATIENT
Start: 2023-05-11

## 2023-05-11 RX ORDER — METFORMIN HYDROCHLORIDE 500 MG/1
1000 TABLET, EXTENDED RELEASE ORAL 2 TIMES DAILY
Qty: 360 TABLET | Refills: 3 | Status: SHIPPED | OUTPATIENT
Start: 2023-05-11

## 2023-05-11 RX ORDER — ERGOCALCIFEROL 1.25 MG/1
50000 CAPSULE ORAL WEEKLY
Qty: 12 CAPSULE | Refills: 3 | Status: SHIPPED | OUTPATIENT
Start: 2023-05-11

## 2023-05-11 RX ORDER — TIRZEPATIDE 7.5 MG/.5ML
0.5 INJECTION, SOLUTION SUBCUTANEOUS WEEKLY
Qty: 2 ML | Refills: 2 | Status: SHIPPED | OUTPATIENT
Start: 2023-05-11

## 2023-05-11 RX ORDER — FLUTICASONE PROPIONATE 50 MCG
2 SPRAY, SUSPENSION (ML) NASAL DAILY
Qty: 11.1 G | Refills: 11 | Status: SHIPPED | OUTPATIENT
Start: 2023-05-11

## 2023-05-11 RX ORDER — PANTOPRAZOLE SODIUM 40 MG/1
40 TABLET, DELAYED RELEASE ORAL DAILY
Qty: 90 TABLET | Refills: 3 | Status: SHIPPED | OUTPATIENT
Start: 2023-05-11

## 2023-05-11 NOTE — PROGRESS NOTES
The ABCs of the Annual Wellness Visit  Subsequent Medicare Wellness Visit    Subjective    Ifrah Robertson is a 62 y.o. female who presents for a Subsequent Medicare Wellness Visit.    The following portions of the patient's history were reviewed and   updated as appropriate: allergies, current medications, past family history, past medical history, past social history, past surgical history and problem list.    Compared to one year ago, the patient feels her physical   health is the same.    Compared to one year ago, the patient feels her mental   health is the same.    Recent Hospitalizations:  She was not admitted to the hospital during the last year.       Current Medical Providers:  Patient Care Team:  Rashid Acosta DO as PCP - General (Family Medicine)  Felipe Cameron Jr., DO (Internal Medicine)    Outpatient Medications Prior to Visit   Medication Sig Dispense Refill   • Aspirin Low Dose 81 MG EC tablet TAKE ONE TABLET BY MOUTH ONCE DAILY (Patient taking differently: Take 1 tablet by mouth Daily.) 30 tablet 5   • Blood Glucose Monitoring Suppl device Use to monitor sugar 5x daily before and after meals and bedtime for uncontrolled insulin dependent diabetes E11.9 6 each 3   • EPINEPHrine (EPIPEN) 0.3 MG/0.3ML solution auto-injector injection Inject 0.3 mg into the appropriate muscle as directed by prescriber 1 (One) Time.     • amoxicillin-clavulanate (Augmentin) 875-125 MG per tablet Take 1 tablet by mouth 2 (Two) Times a Day. 20 tablet 0   • Continuous Blood Gluc Sensor (FreeStyle Risa 2 Sensor) misc 1 each Every 14 (Fourteen) Days. 2 each 5   • escitalopram (LEXAPRO) 20 MG tablet TAKE ONE TABLET BY MOUTH ONCE DAILY 90 tablet 5   • fluticasone (Flonase) 50 MCG/ACT nasal spray 2 sprays into the nostril(s) as directed by provider Daily. 1 g 5   • metFORMIN ER (GLUCOPHAGE-XR) 500 MG 24 hr tablet TAKE 4 TABLETS EVERY MORNING 120 tablet 5   • metoprolol succinate XL (TOPROL-XL) 50 MG 24 hr tablet TAKE ONE  TABLET BY MOUTH ONCE DAILY 90 tablet 3   • pantoprazole (PROTONIX) 40 MG EC tablet TAKE ONE TABLET BY MOUTH ONCE DAILY 90 tablet 3   • Synthroid 200 MCG tablet TAKE ONE TABLET BY MOUTH ONCE DAILY 90 tablet 5   • vitamin D (ERGOCALCIFEROL) 1.25 MG (73127 UT) capsule capsule TAKE 1 CAPSULE BY MOUTH 1 (ONE) TIME PER WEEK. 12 capsule 3     No facility-administered medications prior to visit.       No opioid medication identified on active medication list. I have reviewed chart for other potential  high risk medication/s and harmful drug interactions in the elderly.          Aspirin is on active medication list. Aspirin use is indicated based on review of current medical condition/s. Pros and cons of this therapy have been discussed today. Benefits of this medication outweigh potential harm.  Patient has been encouraged to continue taking this medication.  .      Patient Active Problem List   Diagnosis   • Type 2 diabetes mellitus with hyperglycemia, with long-term current use of insulin   • Esophageal reflux   • High cholesterol   • Hypothyroidism   • Primary insomnia   • Iron deficiency anemia secondary to inadequate dietary iron intake   • Depression   • Lichen sclerosus et atrophicus of the vulva   • Nicotine addiction   • HAILEY (obstructive sleep apnea)   • Stroke   • Generalized osteoarthritis of multiple sites   • Hemorrhoids   • Leukocytosis   • Vitamin D deficiency disease   • Hypocalcemia   • Abnormal uterine bleeding   • Encounter for subsequent annual wellness visit (AWV) in Medicare patient   • EIN (endometrial intraepithelial neoplasia)   • Endometrial intraepithelial neoplasia (EIN)   • Allergic rhinitis   • Bronchitis, mucopurulent recurrent   • Class 3 severe obesity due to excess calories with serious comorbidity and body mass index (BMI) of 45.0 to 49.9 in adult   • Hypertension due to endocrine disorder   • Anaphylaxis, initial encounter     Advance Care Planning   Advance Care Planning     Advance  "Directive is not on file.  ACP discussion was declined by the patient. Patient has an advance directive (not in EMR), copy requested.     Objective    Vitals:    23 1600 23 1606   BP: 154/84 145/79   Pulse: 80    Resp: 14    Temp: 98.9 °F (37.2 °C)    SpO2: 95%    Weight: (!) 138 kg (304 lb 3.2 oz)    Height: 172.7 cm (68\")    PainSc: 0-No pain      Estimated body mass index is 46.25 kg/m² as calculated from the following:    Height as of this encounter: 172.7 cm (68\").    Weight as of this encounter: 138 kg (304 lb 3.2 oz).    Class 3 Severe Obesity (BMI >=40). Obesity-related health conditions include the following: hypertension and diabetes mellitus. Obesity is improving with treatment. BMI is is above average; BMI management plan is completed. We discussed portion control and increasing exercise.      Does the patient have evidence of cognitive impairment? No    Lab Results   Component Value Date    HGBA1C 7.20 (H) 2023        HEALTH RISK ASSESSMENT    Smoking Status:  Social History     Tobacco Use   Smoking Status Every Day   • Packs/day: 1.00   • Years: 41.00   • Pack years: 41.00   • Types: Cigarettes   • Start date:    Smokeless Tobacco Never     Alcohol Consumption:  Social History     Substance and Sexual Activity   Alcohol Use Never     Fall Risk Screen:    LEEANNE Fall Risk Assessment was completed, and patient is at MODERATE risk for falls. Assessment completed on:2023    Depression Screenin/11/2023     4:00 PM   PHQ-2/PHQ-9 Depression Screening   Little Interest or Pleasure in Doing Things 0-->not at all   Feeling Down, Depressed or Hopeless 0-->not at all   PHQ-9: Brief Depression Severity Measure Score 0       Health Habits and Functional and Cognitive Screenin/11/2023     4:00 PM   Functional & Cognitive Status   Do you have difficulty preparing food and eating? No   Do you have difficulty bathing yourself, getting dressed or grooming yourself? No   Do " you have difficulty using the toilet? No   Do you have difficulty moving around from place to place? No   Do you have trouble with steps or getting out of a bed or a chair? No   Current Diet Limited Junk Food   Dental Exam Not up to date   Eye Exam Up to date   Exercise (times per week) 0 times per week   Current Exercises Include No Regular Exercise   Do you need help using the phone?  No   Are you deaf or do you have serious difficulty hearing?  Yes   Do you need help with transportation? No   Do you need help shopping? No   Do you need help preparing meals?  No   Do you need help with housework?  No   Do you need help with laundry? No   Do you need help taking your medications? No   Do you need help managing money? No   Do you ever drive or ride in a car without wearing a seat belt? No   Have you felt unusual stress, anger or loneliness in the last month? No   Who do you live with? Sibling   If you need help, do you have trouble finding someone available to you? No   Have you been bothered in the last four weeks by sexual problems? No   Do you have difficulty concentrating, remembering or making decisions? Yes       Age-appropriate Screening Schedule:  Refer to the list below for future screening recommendations based on patient's age, sex and/or medical conditions. Orders for these recommended tests are listed in the plan section. The patient has been provided with a written plan.    Health Maintenance   Topic Date Due   • COLORECTAL CANCER SCREENING  Never done   • LUNG CANCER SCREENING  10/05/2018   • DIABETIC EYE EXAM  05/16/2023   • COVID-19 Vaccine (5 - Booster) 06/14/2023 (Originally 7/29/2021)   • HEPATITIS C SCREENING  05/05/2024 (Originally 10/18/2021)   • MAMMOGRAM  05/05/2024 (Originally 12/5/2021)   • ZOSTER VACCINE (1 of 2) 05/05/2024 (Originally 11/22/2010)   • TDAP/TD VACCINES (1 - Tdap) 08/21/2024 (Originally 11/22/1979)   • Pneumococcal Vaccine 0-64 (1 - PCV) 08/23/2024 (Originally 11/22/1966)  "  • INFLUENZA VACCINE  08/01/2023   • DIABETIC FOOT EXAM  08/11/2023   • HEMOGLOBIN A1C  09/23/2023   • LIPID PANEL  12/21/2023   • URINE MICROALBUMIN  03/23/2024   • ANNUAL WELLNESS VISIT  05/11/2024   • PAP SMEAR  03/11/2025                  CMS Preventative Services Quick Reference  Risk Factors Identified During Encounter  reviewed  The above risks/problems have been discussed with the patient.  Pertinent information has been shared with the patient in the After Visit Summary.  An After Visit Summary and PPPS were made available to the patient.    Follow Up:   Next Medicare Wellness visit to be scheduled in 1 year.       Additional E&M Note during same encounter follows:  Patient has multiple medical problems which are significant and separately identifiable that require additional work above and beyond the Medicare Wellness Visit.      Chief Complaint  Medicare Wellness-subsequent    Subjective        HPI  Ifrah Robertson is also being seen today for AWV and to review chronic conditions, order monitoring labs such as those for diabetes which she has been very successful with managing and improving. No issues otherwise.          Objective   Vital Signs:  /79   Pulse 80   Temp 98.9 °F (37.2 °C)   Resp 14   Ht 172.7 cm (68\")   Wt (!) 138 kg (304 lb 3.2 oz)   SpO2 95%   BMI 46.25 kg/m²     Physical Exam  Vitals reviewed.   Constitutional:       Appearance: Normal appearance. She is well-developed.   HENT:      Head: Normocephalic and atraumatic.      Right Ear: External ear normal.      Left Ear: External ear normal.      Nose: Nose normal.   Eyes:      Conjunctiva/sclera: Conjunctivae normal.      Pupils: Pupils are equal, round, and reactive to light.   Cardiovascular:      Rate and Rhythm: Normal rate.   Pulmonary:      Effort: Pulmonary effort is normal.      Breath sounds: Normal breath sounds.   Abdominal:      General: There is no distension.   Skin:     General: Skin is warm and dry. "   Neurological:      Mental Status: She is alert and oriented to person, place, and time. Mental status is at baseline.   Psychiatric:         Mood and Affect: Mood and affect normal.         Behavior: Behavior normal.         Thought Content: Thought content normal.         Judgment: Judgment normal.          The following data was reviewed by: Rashid Acosta DO on 05/11/2023:  Common labs        5/11/2023    16:00 5/11/2023    16:06 5/20/2023    01:29 5/20/2023    02:00 5/20/2023    02:45   Patient Spotlight   /84 145/79 158/88 127/71 114/63   Weight 138 kg (304 lb 3.2 oz)  139 kg (307 lb 1.6 oz)     Common Labs   Glucose        BUN        Creatinine        Sodium        Potassium        Chloride        Calcium        Albumin        Total Bilirubin        Alkaline Phosphatase        AST (SGOT)        ALT (SGPT)        WBC        Hemoglobin        Hematocrit        Platelets                5/20/2023    03:15 5/20/2023    04:00 5/20/2023    04:30 5/20/2023    06:00 5/20/2023    06:43   Patient Spotlight   /70 111/87 90/53 67/53    Weight        Common Labs   Glucose     394     BUN     14     Creatinine     1.13     Sodium     131     Potassium     4.7     Chloride     95     Calcium     7.9     Albumin     3.3     Total Bilirubin     0.8     Alkaline Phosphatase     62     AST (SGOT)     11     ALT (SGPT)     8     WBC     19.38     Hemoglobin     15.0     Hematocrit     46.6     Platelets     454             5/20/2023    07:00 5/20/2023    07:15 5/20/2023    07:30 5/20/2023    07:45 5/20/2023    08:00   Patient Spotlight   /94 135/75 110/88 99/64 82/62   Weight        Common Labs   Glucose        BUN        Creatinine        Sodium        Potassium        Chloride        Calcium        Albumin        Total Bilirubin        Alkaline Phosphatase        AST (SGOT)        ALT (SGPT)        WBC        Hemoglobin        Hematocrit        Platelets                5/20/2023    08:15 5/20/2023    08:30  5/20/2023    08:45 5/20/2023    09:00 5/20/2023    09:15   Patient Spotlight   BP 74/49 91/63 97/63 102/67 101/66   Weight        Common Labs   Glucose        BUN        Creatinine        Sodium        Potassium        Chloride        Calcium        Albumin        Total Bilirubin        Alkaline Phosphatase        AST (SGOT)        ALT (SGPT)        WBC        Hemoglobin        Hematocrit        Platelets                5/20/2023    09:30 5/20/2023    09:45 5/20/2023    10:15 5/20/2023    12:00 5/20/2023    13:00   Patient Spotlight   /64 106/69 118/72 117/71 129/91   Weight        Common Labs   Glucose        BUN        Creatinine        Sodium        Potassium        Chloride        Calcium        Albumin        Total Bilirubin        Alkaline Phosphatase        AST (SGOT)        ALT (SGPT)        WBC        Hemoglobin        Hematocrit        Platelets                5/20/2023    19:00 5/20/2023    20:00 5/20/2023    21:00 5/20/2023    22:00 5/20/2023    23:00   Patient Spotlight   /48 138/67 128/70 123/51 118/55   Weight        Common Labs   Glucose        BUN        Creatinine        Sodium        Potassium        Chloride        Calcium        Albumin        Total Bilirubin        Alkaline Phosphatase        AST (SGOT)        ALT (SGPT)        WBC        Hemoglobin        Hematocrit        Platelets                5/21/2023    00:00 5/21/2023    01:00 5/21/2023    02:00 5/21/2023    03:00 5/21/2023    05:00   Patient Spotlight   /63 150/84 136/65 128/60 135/68   Weight        Common Labs   Glucose        BUN        Creatinine        Sodium        Potassium        Chloride        Calcium        Albumin        Total Bilirubin        Alkaline Phosphatase        AST (SGOT)        ALT (SGPT)        WBC        Hemoglobin        Hematocrit        Platelets                5/21/2023    05:24 5/21/2023    06:00   Patient Spotlight   BP  126/65   Weight     Common Labs   Glucose 308      BUN 13       Creatinine 0.70      Sodium 133      Potassium 4.4      Chloride 100      Calcium 8.6      Albumin     Total Bilirubin     Alkaline Phosphatase     AST (SGOT)     ALT (SGPT)     WBC 12.75      Hemoglobin 12.0      Hematocrit 36.8      Platelets 295                   Assessment and Plan   Diagnoses and all orders for this visit:    1. Encounter for subsequent annual wellness visit (AWV) in Medicare patient (Primary)    2. Screening mammogram for breast cancer  -     Cancel: Mammo Screening Bilateral With CAD; Future  -     Ambulatory Referral to Genetic Counseling/Testing    3. Family history of breast cancer  -     Ambulatory Referral to Genetic Counseling/Testing    4. Insulin-requiring or dependent type II diabetes mellitus  -     Tirzepatide (Mounjaro) 7.5 MG/0.5ML solution pen-injector; Inject 0.5 mL under the skin into the appropriate area as directed 1 (One) Time Per Week.  Dispense: 2 mL; Refill: 2    5. Type 2 diabetes mellitus with hyperglycemia, with long-term current use of insulin  -     metFORMIN ER (GLUCOPHAGE-XR) 500 MG 24 hr tablet; Take 2 tablets by mouth 2 (Two) Times a Day. (Patient taking differently: Take 3 tablets by mouth Daily.)  Dispense: 360 tablet; Refill: 3  -     Tirzepatide (Mounjaro) 7.5 MG/0.5ML solution pen-injector; Inject 0.5 mL under the skin into the appropriate area as directed 1 (One) Time Per Week.  Dispense: 2 mL; Refill: 2    6. Hypothyroidism, unspecified type  -     Synthroid 200 MCG tablet; Take 1 tablet by mouth Daily.  Dispense: 90 tablet; Refill: 3    7. Class 3 severe obesity due to excess calories with serious comorbidity and body mass index (BMI) of 45.0 to 49.9 in adult    8. Dyslipidemia with low high density lipoprotein (HDL) cholesterol with hypertriglyceridemia due to type 2 diabetes mellitus    9. Vitamin D deficiency  -     vitamin D (ERGOCALCIFEROL) 1.25 MG (96773 UT) capsule capsule; Take 1 capsule by mouth 1 (One) Time Per Week.  Dispense: 12 capsule;  Refill: 3    10. Hypertension due to endocrine disorder  -     metoprolol succinate XL (TOPROL-XL) 50 MG 24 hr tablet; Take 1 tablet by mouth Daily.  Dispense: 90 tablet; Refill: 3    11. Allergic rhinitis, unspecified seasonality, unspecified trigger  -     fluticasone (Flonase) 50 MCG/ACT nasal spray; 2 sprays into the nostril(s) as directed by provider Daily.  Dispense: 11.1 g; Refill: 11    Other orders  -     escitalopram (LEXAPRO) 20 MG tablet; Take 1 tablet by mouth Daily.  Dispense: 90 tablet; Refill: 3  -     pantoprazole (PROTONIX) 40 MG EC tablet; Take 1 tablet by mouth Daily.  Dispense: 90 tablet; Refill: 3      Reviewed AWV recommendations and ordered as appropriate, follow up annually for review, sooner if concerns    No depression, falls, dementia symptoms or other  Risk and home safety assessment good    Followed up on chronic conditions and ordered refills, appropriate monitoring labs additionally as needed every 6 months or sooner if indicated, controlled stable    Follow up at least every 3-6 months for chronic conditions and as scheduled with appropriate specialists as indicated otherwise    Continue medicines as prescribed for chronic conditions, fu every 3-6 months or sooner if indicated or abnl labs, renewed medicines and AWV completed as above         Follow Up   Return in about 3 months (around 8/11/2023), or if symptoms worsen or fail to improve, for Recheck.  Patient was given instructions and counseling regarding her condition or for health maintenance advice. Please see specific information pulled into the AVS if appropriate.

## 2023-05-15 ENCOUNTER — TRANSCRIBE ORDERS (OUTPATIENT)
Dept: ADMINISTRATIVE | Facility: HOSPITAL | Age: 63
End: 2023-05-15
Payer: MEDICARE

## 2023-05-15 DIAGNOSIS — Z12.31 SCREENING MAMMOGRAM, ENCOUNTER FOR: Primary | ICD-10-CM

## 2023-05-20 ENCOUNTER — HOSPITAL ENCOUNTER (INPATIENT)
Facility: HOSPITAL | Age: 63
LOS: 1 days | Discharge: HOME | End: 2023-05-21
Attending: EMERGENCY MEDICINE | Admitting: INTERNAL MEDICINE
Payer: MEDICARE

## 2023-05-20 ENCOUNTER — APPOINTMENT (OUTPATIENT)
Dept: GENERAL RADIOLOGY | Facility: HOSPITAL | Age: 63
End: 2023-05-20
Payer: MEDICARE

## 2023-05-20 DIAGNOSIS — T78.2XXA ANAPHYLAXIS, INITIAL ENCOUNTER: Primary | ICD-10-CM

## 2023-05-20 LAB
ALBUMIN SERPL-MCNC: 3.3 G/DL (ref 3.5–5.2)
ALBUMIN/GLOB SERPL: 1.2 G/DL
ALP SERPL-CCNC: 62 U/L (ref 39–117)
ALT SERPL W P-5'-P-CCNC: 8 U/L (ref 1–33)
ANION GAP SERPL CALCULATED.3IONS-SCNC: 14.3 MMOL/L (ref 5–15)
AST SERPL-CCNC: 11 U/L (ref 1–32)
BASOPHILS # BLD AUTO: 0.08 10*3/MM3 (ref 0–0.2)
BASOPHILS NFR BLD AUTO: 0.4 % (ref 0–1.5)
BILIRUB SERPL-MCNC: 0.8 MG/DL (ref 0–1.2)
BUN SERPL-MCNC: 14 MG/DL (ref 8–23)
BUN/CREAT SERPL: 12.4 (ref 7–25)
CALCIUM SPEC-SCNC: 7.9 MG/DL (ref 8.6–10.5)
CHLORIDE SERPL-SCNC: 95 MMOL/L (ref 98–107)
CO2 SERPL-SCNC: 21.7 MMOL/L (ref 22–29)
CREAT SERPL-MCNC: 1.13 MG/DL (ref 0.57–1)
DEPRECATED RDW RBC AUTO: 45.1 FL (ref 37–54)
EGFRCR SERPLBLD CKD-EPI 2021: 55.1 ML/MIN/1.73
EOSINOPHIL # BLD AUTO: 0.15 10*3/MM3 (ref 0–0.4)
EOSINOPHIL NFR BLD AUTO: 0.8 % (ref 0.3–6.2)
ERYTHROCYTE [DISTWIDTH] IN BLOOD BY AUTOMATED COUNT: 16.7 % (ref 12.3–15.4)
GLOBULIN UR ELPH-MCNC: 2.8 GM/DL
GLUCOSE BLDC GLUCOMTR-MCNC: 323 MG/DL (ref 70–99)
GLUCOSE BLDC GLUCOMTR-MCNC: 329 MG/DL (ref 70–99)
GLUCOSE BLDC GLUCOMTR-MCNC: 344 MG/DL (ref 70–99)
GLUCOSE BLDC GLUCOMTR-MCNC: 362 MG/DL (ref 70–99)
GLUCOSE SERPL-MCNC: 394 MG/DL (ref 65–99)
HCT VFR BLD AUTO: 46.6 % (ref 34–46.6)
HGB BLD-MCNC: 15 G/DL (ref 12–15.9)
IMM GRANULOCYTES # BLD AUTO: 0.23 10*3/MM3 (ref 0–0.05)
IMM GRANULOCYTES NFR BLD AUTO: 1.2 % (ref 0–0.5)
LYMPHOCYTES # BLD AUTO: 2.15 10*3/MM3 (ref 0.7–3.1)
LYMPHOCYTES NFR BLD AUTO: 11.1 % (ref 19.6–45.3)
MAGNESIUM SERPL-MCNC: 1.4 MG/DL (ref 1.6–2.4)
MCH RBC QN AUTO: 25.5 PG (ref 26.6–33)
MCHC RBC AUTO-ENTMCNC: 32.2 G/DL (ref 31.5–35.7)
MCV RBC AUTO: 79.1 FL (ref 79–97)
MONOCYTES # BLD AUTO: 0.63 10*3/MM3 (ref 0.1–0.9)
MONOCYTES NFR BLD AUTO: 3.3 % (ref 5–12)
NEUTROPHILS NFR BLD AUTO: 16.14 10*3/MM3 (ref 1.7–7)
NEUTROPHILS NFR BLD AUTO: 83.2 % (ref 42.7–76)
NRBC BLD AUTO-RTO: 0 /100 WBC (ref 0–0.2)
PHOSPHATE SERPL-MCNC: 4.2 MG/DL (ref 2.5–4.5)
PLATELET # BLD AUTO: 454 10*3/MM3 (ref 140–450)
PMV BLD AUTO: 10 FL (ref 6–12)
POTASSIUM SERPL-SCNC: 4.7 MMOL/L (ref 3.5–5.2)
PROT SERPL-MCNC: 6.1 G/DL (ref 6–8.5)
RBC # BLD AUTO: 5.89 10*6/MM3 (ref 3.77–5.28)
SODIUM SERPL-SCNC: 131 MMOL/L (ref 136–145)
WBC NRBC COR # BLD: 19.38 10*3/MM3 (ref 3.4–10.8)

## 2023-05-20 PROCEDURE — 86003 ALLG SPEC IGE CRUDE XTRC EA: CPT | Performed by: PHYSICIAN ASSISTANT

## 2023-05-20 PROCEDURE — 25010000002 PROCHLORPERAZINE 10 MG/2ML SOLUTION: Performed by: EMERGENCY MEDICINE

## 2023-05-20 PROCEDURE — 71045 X-RAY EXAM CHEST 1 VIEW: CPT

## 2023-05-20 PROCEDURE — 25010000002 ONDANSETRON PER 1 MG: Performed by: EMERGENCY MEDICINE

## 2023-05-20 PROCEDURE — 99291 CRITICAL CARE FIRST HOUR: CPT | Performed by: FAMILY MEDICINE

## 2023-05-20 PROCEDURE — 94660 CPAP INITIATION&MGMT: CPT

## 2023-05-20 PROCEDURE — 85025 COMPLETE CBC W/AUTO DIFF WBC: CPT | Performed by: EMERGENCY MEDICINE

## 2023-05-20 PROCEDURE — 82948 REAGENT STRIP/BLOOD GLUCOSE: CPT

## 2023-05-20 PROCEDURE — 99285 EMERGENCY DEPT VISIT HI MDM: CPT

## 2023-05-20 PROCEDURE — 63710000001 INSULIN LISPRO (HUMAN) PER 5 UNITS: Performed by: PHYSICIAN ASSISTANT

## 2023-05-20 PROCEDURE — 94799 UNLISTED PULMONARY SVC/PX: CPT

## 2023-05-20 PROCEDURE — 99291 CRITICAL CARE FIRST HOUR: CPT | Performed by: INTERNAL MEDICINE

## 2023-05-20 PROCEDURE — 25010000002 DIPHENHYDRAMINE PER 50 MG: Performed by: PHYSICIAN ASSISTANT

## 2023-05-20 PROCEDURE — 82785 ASSAY OF IGE: CPT | Performed by: PHYSICIAN ASSISTANT

## 2023-05-20 PROCEDURE — 25010000002 EPINEPHRINE 1 MG/ML SOLUTION: Performed by: EMERGENCY MEDICINE

## 2023-05-20 PROCEDURE — 0 MAGNESIUM SULFATE 4 GM/100ML SOLUTION: Performed by: PHYSICIAN ASSISTANT

## 2023-05-20 PROCEDURE — 25010000002 METHYLPREDNISOLONE PER 125 MG: Performed by: EMERGENCY MEDICINE

## 2023-05-20 PROCEDURE — 83735 ASSAY OF MAGNESIUM: CPT | Performed by: PHYSICIAN ASSISTANT

## 2023-05-20 PROCEDURE — 80053 COMPREHEN METABOLIC PANEL: CPT | Performed by: EMERGENCY MEDICINE

## 2023-05-20 PROCEDURE — 84100 ASSAY OF PHOSPHORUS: CPT | Performed by: PHYSICIAN ASSISTANT

## 2023-05-20 PROCEDURE — 36415 COLL VENOUS BLD VENIPUNCTURE: CPT

## 2023-05-20 RX ORDER — ALUMINA, MAGNESIA, AND SIMETHICONE 2400; 2400; 240 MG/30ML; MG/30ML; MG/30ML
15 SUSPENSION ORAL EVERY 6 HOURS PRN
Status: DISCONTINUED | OUTPATIENT
Start: 2023-05-20 | End: 2023-05-21 | Stop reason: HOSPADM

## 2023-05-20 RX ORDER — ONDANSETRON 2 MG/ML
4 INJECTION INTRAMUSCULAR; INTRAVENOUS ONCE
Status: COMPLETED | OUTPATIENT
Start: 2023-05-20 | End: 2023-05-20

## 2023-05-20 RX ORDER — ACETAMINOPHEN 160 MG/5ML
650 SOLUTION ORAL EVERY 4 HOURS PRN
Status: DISCONTINUED | OUTPATIENT
Start: 2023-05-20 | End: 2023-05-21 | Stop reason: HOSPADM

## 2023-05-20 RX ORDER — CHOLECALCIFEROL (VITAMIN D3) 125 MCG
5 CAPSULE ORAL NIGHTLY PRN
Status: DISCONTINUED | OUTPATIENT
Start: 2023-05-20 | End: 2023-05-21 | Stop reason: HOSPADM

## 2023-05-20 RX ORDER — ACETAMINOPHEN 325 MG/1
650 TABLET ORAL EVERY 4 HOURS PRN
Status: DISCONTINUED | OUTPATIENT
Start: 2023-05-20 | End: 2023-05-21 | Stop reason: HOSPADM

## 2023-05-20 RX ORDER — MONTELUKAST SODIUM 10 MG/1
10 TABLET ORAL ONCE
Status: COMPLETED | OUTPATIENT
Start: 2023-05-20 | End: 2023-05-20

## 2023-05-20 RX ORDER — FAMOTIDINE 10 MG/ML
20 INJECTION, SOLUTION INTRAVENOUS EVERY 12 HOURS SCHEDULED
Status: DISCONTINUED | OUTPATIENT
Start: 2023-05-20 | End: 2023-05-21 | Stop reason: HOSPADM

## 2023-05-20 RX ORDER — SODIUM CHLORIDE 0.9 % (FLUSH) 0.9 %
10 SYRINGE (ML) INJECTION AS NEEDED
Status: DISCONTINUED | OUTPATIENT
Start: 2023-05-20 | End: 2023-05-21 | Stop reason: HOSPADM

## 2023-05-20 RX ORDER — MAGNESIUM SULFATE HEPTAHYDRATE 40 MG/ML
4 INJECTION, SOLUTION INTRAVENOUS ONCE
Status: COMPLETED | OUTPATIENT
Start: 2023-05-20 | End: 2023-05-20

## 2023-05-20 RX ORDER — DIPHENHYDRAMINE HCL 50 MG
50 CAPSULE ORAL NIGHTLY PRN
COMMUNITY

## 2023-05-20 RX ORDER — SODIUM CHLORIDE 0.9 % (FLUSH) 0.9 %
10 SYRINGE (ML) INJECTION EVERY 12 HOURS SCHEDULED
Status: DISCONTINUED | OUTPATIENT
Start: 2023-05-20 | End: 2023-05-21 | Stop reason: HOSPADM

## 2023-05-20 RX ORDER — AMOXICILLIN 250 MG
2 CAPSULE ORAL 2 TIMES DAILY
Status: DISCONTINUED | OUTPATIENT
Start: 2023-05-20 | End: 2023-05-21 | Stop reason: HOSPADM

## 2023-05-20 RX ORDER — METHYLPREDNISOLONE SODIUM SUCCINATE 40 MG/ML
40 INJECTION, POWDER, LYOPHILIZED, FOR SOLUTION INTRAMUSCULAR; INTRAVENOUS EVERY 8 HOURS
Status: DISCONTINUED | OUTPATIENT
Start: 2023-05-20 | End: 2023-05-20

## 2023-05-20 RX ORDER — SODIUM CHLORIDE, SODIUM LACTATE, POTASSIUM CHLORIDE, CALCIUM CHLORIDE 600; 310; 30; 20 MG/100ML; MG/100ML; MG/100ML; MG/100ML
100 INJECTION, SOLUTION INTRAVENOUS CONTINUOUS
Status: DISCONTINUED | OUTPATIENT
Start: 2023-05-20 | End: 2023-05-21 | Stop reason: HOSPADM

## 2023-05-20 RX ORDER — PREDNISONE 20 MG/1
40 TABLET ORAL DAILY
Status: DISCONTINUED | OUTPATIENT
Start: 2023-05-21 | End: 2023-05-21 | Stop reason: HOSPADM

## 2023-05-20 RX ORDER — METHYLPREDNISOLONE SODIUM SUCCINATE 125 MG/2ML
125 INJECTION, POWDER, LYOPHILIZED, FOR SOLUTION INTRAMUSCULAR; INTRAVENOUS ONCE
Status: COMPLETED | OUTPATIENT
Start: 2023-05-20 | End: 2023-05-20

## 2023-05-20 RX ORDER — BISACODYL 10 MG
10 SUPPOSITORY, RECTAL RECTAL DAILY PRN
Status: DISCONTINUED | OUTPATIENT
Start: 2023-05-20 | End: 2023-05-21 | Stop reason: HOSPADM

## 2023-05-20 RX ORDER — ACETAMINOPHEN 650 MG/1
650 SUPPOSITORY RECTAL EVERY 4 HOURS PRN
Status: DISCONTINUED | OUTPATIENT
Start: 2023-05-20 | End: 2023-05-21 | Stop reason: HOSPADM

## 2023-05-20 RX ORDER — DIPHENHYDRAMINE HYDROCHLORIDE 50 MG/ML
50 INJECTION INTRAMUSCULAR; INTRAVENOUS EVERY 6 HOURS
Status: DISCONTINUED | OUTPATIENT
Start: 2023-05-20 | End: 2023-05-21 | Stop reason: HOSPADM

## 2023-05-20 RX ORDER — PREDNISONE 20 MG/1
40 TABLET ORAL DAILY
Status: DISCONTINUED | OUTPATIENT
Start: 2023-05-20 | End: 2023-05-20

## 2023-05-20 RX ORDER — NICOTINE POLACRILEX 4 MG
15 LOZENGE BUCCAL
Status: DISCONTINUED | OUTPATIENT
Start: 2023-05-20 | End: 2023-05-21 | Stop reason: HOSPADM

## 2023-05-20 RX ORDER — POLYETHYLENE GLYCOL 3350 17 G/17G
17 POWDER, FOR SOLUTION ORAL DAILY PRN
Status: DISCONTINUED | OUTPATIENT
Start: 2023-05-20 | End: 2023-05-21 | Stop reason: HOSPADM

## 2023-05-20 RX ORDER — DEXTROSE MONOHYDRATE 25 G/50ML
25 INJECTION, SOLUTION INTRAVENOUS
Status: DISCONTINUED | OUTPATIENT
Start: 2023-05-20 | End: 2023-05-21 | Stop reason: HOSPADM

## 2023-05-20 RX ORDER — FAMOTIDINE 10 MG/ML
20 INJECTION, SOLUTION INTRAVENOUS ONCE
Status: COMPLETED | OUTPATIENT
Start: 2023-05-20 | End: 2023-05-20

## 2023-05-20 RX ORDER — BISACODYL 5 MG/1
5 TABLET, DELAYED RELEASE ORAL DAILY PRN
Status: DISCONTINUED | OUTPATIENT
Start: 2023-05-20 | End: 2023-05-21 | Stop reason: HOSPADM

## 2023-05-20 RX ORDER — INSULIN LISPRO 100 [IU]/ML
3-14 INJECTION, SOLUTION INTRAVENOUS; SUBCUTANEOUS
Status: DISCONTINUED | OUTPATIENT
Start: 2023-05-20 | End: 2023-05-21 | Stop reason: HOSPADM

## 2023-05-20 RX ORDER — ONDANSETRON 4 MG/1
4 TABLET, FILM COATED ORAL EVERY 4 HOURS PRN
Status: DISCONTINUED | OUTPATIENT
Start: 2023-05-20 | End: 2023-05-21 | Stop reason: HOSPADM

## 2023-05-20 RX ORDER — SODIUM CHLORIDE 9 MG/ML
40 INJECTION, SOLUTION INTRAVENOUS AS NEEDED
Status: DISCONTINUED | OUTPATIENT
Start: 2023-05-20 | End: 2023-05-21 | Stop reason: HOSPADM

## 2023-05-20 RX ORDER — ONDANSETRON 2 MG/ML
4 INJECTION INTRAMUSCULAR; INTRAVENOUS EVERY 4 HOURS PRN
Status: DISCONTINUED | OUTPATIENT
Start: 2023-05-20 | End: 2023-05-21 | Stop reason: HOSPADM

## 2023-05-20 RX ORDER — PROCHLORPERAZINE EDISYLATE 5 MG/ML
10 INJECTION INTRAMUSCULAR; INTRAVENOUS ONCE
Status: COMPLETED | OUTPATIENT
Start: 2023-05-20 | End: 2023-05-20

## 2023-05-20 RX ADMIN — Medication 10 ML: at 21:01

## 2023-05-20 RX ADMIN — INSULIN LISPRO 10 UNITS: 100 INJECTION, SOLUTION INTRAVENOUS; SUBCUTANEOUS at 21:01

## 2023-05-20 RX ADMIN — INSULIN LISPRO 10 UNITS: 100 INJECTION, SOLUTION INTRAVENOUS; SUBCUTANEOUS at 12:06

## 2023-05-20 RX ADMIN — Medication 10 ML: at 09:39

## 2023-05-20 RX ADMIN — FAMOTIDINE 20 MG: 10 INJECTION INTRAVENOUS at 02:18

## 2023-05-20 RX ADMIN — PROCHLORPERAZINE EDISYLATE 10 MG: 5 INJECTION, SOLUTION INTRAMUSCULAR; INTRAVENOUS at 06:05

## 2023-05-20 RX ADMIN — FAMOTIDINE 20 MG: 10 INJECTION INTRAVENOUS at 09:32

## 2023-05-20 RX ADMIN — INSULIN LISPRO 12 UNITS: 100 INJECTION, SOLUTION INTRAVENOUS; SUBCUTANEOUS at 09:39

## 2023-05-20 RX ADMIN — ONDANSETRON 4 MG: 2 INJECTION INTRAMUSCULAR; INTRAVENOUS at 04:04

## 2023-05-20 RX ADMIN — DIPHENHYDRAMINE HYDROCHLORIDE 50 MG: 50 INJECTION INTRAMUSCULAR; INTRAVENOUS at 20:12

## 2023-05-20 RX ADMIN — DIPHENHYDRAMINE HYDROCHLORIDE 50 MG: 50 INJECTION INTRAMUSCULAR; INTRAVENOUS at 09:31

## 2023-05-20 RX ADMIN — FAMOTIDINE 20 MG: 10 INJECTION INTRAVENOUS at 21:01

## 2023-05-20 RX ADMIN — DIPHENHYDRAMINE HYDROCHLORIDE 50 MG: 50 INJECTION INTRAMUSCULAR; INTRAVENOUS at 14:48

## 2023-05-20 RX ADMIN — EPINEPHRINE 0.3 MG: 1 INJECTION INTRAMUSCULAR; INTRAVENOUS; SUBCUTANEOUS at 06:20

## 2023-05-20 RX ADMIN — INSULIN LISPRO 10 UNITS: 100 INJECTION, SOLUTION INTRAVENOUS; SUBCUTANEOUS at 16:46

## 2023-05-20 RX ADMIN — METHYLPREDNISOLONE SODIUM SUCCINATE 125 MG: 125 INJECTION INTRAMUSCULAR; INTRAVENOUS at 06:21

## 2023-05-20 RX ADMIN — SODIUM CHLORIDE 1000 ML: 9 INJECTION, SOLUTION INTRAVENOUS at 06:21

## 2023-05-20 RX ADMIN — MAGNESIUM SULFATE HEPTAHYDRATE 4 G: 4 INJECTION, SOLUTION INTRAVENOUS at 08:22

## 2023-05-20 RX ADMIN — MONTELUKAST 10 MG: 10 TABLET, FILM COATED ORAL at 02:16

## 2023-05-20 RX ADMIN — SODIUM CHLORIDE, POTASSIUM CHLORIDE, SODIUM LACTATE AND CALCIUM CHLORIDE 100 ML/HR: 600; 310; 30; 20 INJECTION, SOLUTION INTRAVENOUS at 08:22

## 2023-05-20 NOTE — ED NOTES
Pt. Presents to the ER by EMS for allergic reaction. Pt. Reported that she is allergic to gluten. Pt. Reported she ate a roast that had gluten in it earlier today. Pt. Reported that the hives have gotten worse. Ems reported the pt. Took 50 mg of benadryl at home. Ems stated they administered 25mg IV benadryl.    5

## 2023-05-20 NOTE — H&P
[facility]   HISTORY AND PHYSICAL    Patient Name: Ifrah Robertson  : 1960  MRN: [id number]  Primary Care Physician:  Rashid Acosta DO  Date of admission: 2023    Subjective   Subjective     Chief Complaint: Allergic reaction    History of Present Illness  Patient is a 62 y.o. year old female who presents to the emergency department for evaluation of allergic reaction     Patient states that she made a roast last night with Italian onion soup.  She has had a reaction to the soup in the past she did not have a problem when she ate it last night.  But this morning when she woke up she ate leftovers and began to have diffuse urticarial rash over bilateral upper extremities and torso.  States she is very itchy.  She attempted to treat at home with Benadryl with little improvement.  She felt as though was worsening tonight at bedtime and called EMS.  She denies any trouble breathing or sensation of throat swelling.  While in the emergency room her blood pressure started dropping.  She was given 0.3 IM of epinephrine with improvement in her blood pressure.  She will be admitted for further evaluation and treatment  Review of Systems   Constitutional: Positive for activity change and fatigue.   Respiratory: Positive for shortness of breath.    Skin: Positive for rash.   Neurological: Positive for dizziness and weakness.        Personal History     Past Medical History:   Diagnosis Date   • Anemia    • Arnold-Chiari deformity    • Diabetes mellitus     Dr. Cameron manages   • Obesity    • Smoker    • Stroke 2018    no deficits, baby ASA only, hx of blood thinners 2018   • Uterine cancer    • Visual impairment        Past Surgical History:   Procedure Laterality Date   • ANUS SURGERY      blood clot   • BRAIN SURGERY      shunt x3, and decompression   • CARPAL TUNNEL RELEASE Left    •  SECTION     • FOOT SURGERY Right    • THYROID SURGERY         Family History: family history includes Breast cancer  in her mother; Cancer in her mother; Heart disease in her mother; Stroke in her father. Otherwise pertinent FHx was reviewed and not pertinent to current issue.    Social History:  reports that she has been smoking cigarettes. She started smoking about 43 years ago. She has a 41.00 pack-year smoking history. She has never used smokeless tobacco. She reports that she does not drink alcohol and does not use drugs.    Home Medications:  EPINEPHrine, FreeStyle Risa 2 Sensor, Tirzepatide, aspirin, escitalopram, fluticasone, levothyroxine, metFORMIN ER, metoprolol succinate XL, pantoprazole, and vitamin D    Allergies:  Allergies   Allergen Reactions   • Tetracycline Hcl Unknown - High Severity     INFECTED BLOOD   • Valacyclovir Hcl Hives   • Latex Rash     Rash    • Azithromycin Hives   • Cefaclor Unknown - Low Severity   • Gluten Meal Other (See Comments)   • Hydrocodone-Acetaminophen Unknown - Low Severity   • Indomethacin Unknown - Low Severity   • Ketorolac Tromethamine Unknown - Low Severity   • Trazodone Dizziness       Objective    Objective     Vitals:   Temp:  [98 °F (36.7 °C)] 98 °F (36.7 °C)  Heart Rate:  [88-95] 93  Resp:  [18] 18  BP: ()/(53-88) 90/53    Physical Exam  Constitutional:  Well-developed and well-nourished.  No acute distress.      HENT:  Head:  Normocephalic and atraumatic.  Mouth:  Moist mucous membranes.    Eyes:  Conjunctivae and EOM are normal. No scleral icterus.    Neck:  Neck supple.  No JVD present.    Cardiovascular:  Normal rate, regular rhythm and normal heart sounds with no murmur.  Pulmonary/Chest:  No respiratory distress, no wheezes, no crackles, with normal breath sounds and good air movement.  Abdominal:  Soft. No distension and no tenderness.   Musculoskeletal:  No tenderness, and no deformity.  No red or swollen joints anywhere.    Neurological:  Alert and oriented to person, place, and time.  No cranial nerve deficit.    Skin: Urticarial wheals scattered all over her  body.   Peripheral vascular:  No clubbing, no cyanosis, no edema.  Psychiatric: Appropriate mood and affect    Result Review    Result Review:  I have personally reviewed the results from the time of this admission to 5/20/2023 06:33 EDT and agree with these findings:  [x]  Laboratory list / accordion  []  Microbiology  [x]  Radiology  []  EKG/Telemetry   []  Cardiology/Vascular   []  Pathology  []  Old records  []  Other:  Most notable findings include:     Assessment & Plan   Assessment / Plan     Brief Patient Summary:  Ifrah Robertson is a 62 y.o. female who presented to the emergency department with rash after ingesting gluten which she is allergic to.  While in the emergency department her blood pressure started to drop but then responded to epinephrine.  We are waiting labs at the time of admission     Active Hospital Problems:  1.  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  Hypotension  3.  Tachycardia  4.  Diabetes mellitus    Plan:   Admit the patient to the intensive care unit  Start the patient on epinephrine drip  Start patient on IV corticosteroids  Start the patient on IV antihistamines  Continue the patient on IV fluid resuscitation  Avoid gluten with gluten-free diet  Sliding scale insulin protocol    DVT prophylaxis:  No DVT prophylaxis order currently exists.    CODE STATUS:    Code Status (Patient has no pulse and is not breathing): CPR (Attempt to Resuscitate)  Medical Interventions (Patient has pulse or is breathing): Full Support    Admission Status:  I believe this patient meets inpatient* status.  Critical care time 35 minutes    Ollie Powell DO

## 2023-05-20 NOTE — CONSULTS
[facility]   Consult Note    Patient Name: Ifrah Robertson  : 1960  MRN: [id number]  Primary Care Physician:  Rashid Acosta DO  Referring Physician: No ref. provider found  Date of admission: 2023    Consults  Subjective   Subjective     Reason for Consult/ Chief Complaint: Critical care management    History of Present Illness  Ifrah Robertson is a 62 y.o. female critically ill in the intensive care unit  Patient ate some [place] onion soup  Shortly after which she developed facial swelling lip swelling and eye swelling  This happened a few years back  Patient waited 3 hours then presented to the ER  Patient was found to be hypotensive into the 60s systolic with diffuse rash and facial swelling  Patient was given intramuscular epinephrine  Blood pressure has responded  Still with labile blood pressures when the fluids are turned off  Facial swelling has improved    Review of Systems   Positive shortness of breath  Positive hives  Positive for lip swelling  Positive for eye swelling  Personal History     Past Medical History:   Diagnosis Date   • Anemia    • Arnold-Chiari deformity    • Diabetes mellitus     Dr. Cameron manages   • Obesity    • Smoker    • Stroke 2018    no deficits, baby ASA only, hx of blood thinners 2018   • Uterine cancer    • Visual impairment        Past Surgical History:   Procedure Laterality Date   • ANUS SURGERY      blood clot   • BRAIN SURGERY      shunt x3, and decompression   • CARPAL TUNNEL RELEASE Left    •  SECTION     • FOOT SURGERY Right    • THYROID SURGERY         Family History: family history includes Breast cancer in her mother; Cancer in her mother; Heart disease in her mother; Stroke in her father. Otherwise pertinent FHx was reviewed and not pertinent to current issue.    Social History:  reports that she has been smoking cigarettes. She started smoking about 43 years ago. She has a 41.00 pack-year smoking history. She has never used smokeless  tobacco. She reports that she does not drink alcohol and does not use drugs.    Home Medications:   EPINEPHrine, Tirzepatide, aspirin, diphenhydrAMINE, escitalopram, fluticasone, levothyroxine, metFORMIN ER, metoprolol succinate XL, pantoprazole, and vitamin D    Allergies:  Allergies   Allergen Reactions   • Tetracycline Hcl Unknown - High Severity     INFECTED BLOOD   • Valacyclovir Hcl Hives   • Latex Rash     Rash    • Azithromycin Hives   • Cefaclor Unknown - Low Severity   • Gluten Meal Other (See Comments)   • Hydrocodone-Acetaminophen Unknown - Low Severity   • Indomethacin Unknown - Low Severity   • Ketorolac Tromethamine Unknown - Low Severity   • Trazodone Dizziness       Objective    Objective     Vitals:  Temp:  [97.9 °F (36.6 °C)-98.9 °F (37.2 °C)] 97.9 °F (36.6 °C)  Heart Rate:  [77-95] 80  Resp:  [18] 18  BP: ()/(49-94) 118/72    Physical Exam  Pleasant obese female  Does have some swelling around her eyes and her lips  Faint wheezing  No respiratory distress noted  Alert and oriented  Result Review    Result Review:  I have personally reviewed the results from the time of this admission to 5/20/2023 17:45 EDT and agree with these findings:  []  Laboratory  []  Microbiology  []  Radiology  []  EKG/Telemetry   []  Cardiology/Vascular   []  Pathology  []  Old records  []  Other:    Most notable findings include:     Assessment & Plan   Assessment / Plan     Brief Patient Summary:  Ifrah Robertson is a 62 y.o. female who critically ill in the ICU with anaphylactic shock    Active Hospital Problems:  Active Hospital Problems    Diagnosis    • **Anaphylaxis, initial encounter    Anaphylactic shock  Hives    Plan:   Patient appeared to have anaphylactic shock on admission and responded appropriately to epinephrine  Blood pressures have been soft but have rebounded with fluids  We will continue with scheduled Benadryl for the first 24 hours along with steroids  Since anaphylaxis does have a bimodal  presentation  Epinephrine if needed for hypotension  Continue sliding scale insulin  Continue Pepcid  Continue lactated Ringer's for hypotension  Advance diet today  Discussed with the patient she needs to follow-up with allergist as an outpatient    We will send food allergen panel  Replace magnesium with 4 g of IV mag  BiPAP to sleep    Patient is critically ill in ICU with  anaphylactic shock  I spent 30 minutes of critical care time excluding any procedure notes, spent in review, analysis, obtaining history and physical, formulating care plan, and I led multi-disciplinary critical care rounds with bedside nurse, respiratory therapist, clinical pharmacist and other allied services. I have discussed the case with primary service and other consultants as well.    Electronically signed by Fitz Copeland DO, 05/20/23, 5:45 PM EDT.

## 2023-05-20 NOTE — ED PROVIDER NOTES
Time: 1:32 AM EDT  Date of encounter:  2023  Independent Historian/Clinical History and Information was obtained by:   Patient  Chief Complaint: Allergic reaction    History is limited by: N/A    History of Present Illness:  Patient is a 62 y.o. year old female who presents to the emergency department for evaluation of allergic reaction    Patient states that she made a roast last night with Telugu onion soup.  She has had a reaction to the soup in the past she did not have a problem when she ate it last night.  But this morning when she woke up she ate leftovers and began to have diffuse urticarial rash over bilateral upper extremities and torso.  States she is very itchy.  She attempted to treat at home with Benadryl with little improvement.  She felt as though was worsening tonight at bedtime and called EMS.  She denies any trouble breathing or sensation of throat swelling.    HPI    Patient Care Team  Primary Care Provider: aRshid Acosta DO    Past Medical History:     Allergies   Allergen Reactions   • Tetracycline Hcl Unknown - High Severity     INFECTED BLOOD   • Valacyclovir Hcl Hives   • Latex Rash     Rash    • Azithromycin Hives   • Cefaclor Unknown - Low Severity   • Gluten Meal Other (See Comments)   • Hydrocodone-Acetaminophen Unknown - Low Severity   • Indomethacin Unknown - Low Severity   • Ketorolac Tromethamine Unknown - Low Severity   • Trazodone Dizziness     Past Medical History:   Diagnosis Date   • Anemia    • Arnold-Chiari deformity    • Diabetes mellitus     Dr. Cameron manages   • Obesity    • Smoker    • Stroke 2018    no deficits, baby ASA only, hx of blood thinners 2018   • Uterine cancer    • Visual impairment      Past Surgical History:   Procedure Laterality Date   • ANUS SURGERY      blood clot   • BRAIN SURGERY      shunt x3, and decompression   • CARPAL TUNNEL RELEASE Left    •  SECTION     • FOOT SURGERY Right    • THYROID SURGERY       Family History   Problem  Relation Age of Onset   • Stroke Father    • Breast cancer Mother    • Heart disease Mother    • Cancer Mother    • Uterine cancer Neg Hx    • Colon cancer Neg Hx    • Ovarian cancer Neg Hx        Home Medications:  Prior to Admission medications    Medication Sig Start Date End Date Taking? Authorizing Provider   Aspirin Low Dose 81 MG EC tablet TAKE ONE TABLET BY MOUTH ONCE DAILY 6/15/22   Keerthi Herrera APRN   Continuous Blood Gluc Sensor (FreeStyle Risa 2 Sensor) misc 1 each Every 14 (Fourteen) Days. 6/14/22   Rashid Acosta DO   EPINEPHrine (EPIPEN) 0.3 MG/0.3ML solution auto-injector injection  12/12/22   Provider, MD Chas   escitalopram (LEXAPRO) 20 MG tablet Take 1 tablet by mouth Daily. 5/11/23   Rashid Acosta DO   fluticasone (Flonase) 50 MCG/ACT nasal spray 2 sprays into the nostril(s) as directed by provider Daily. 5/11/23   Rashid Acosta DO   metFORMIN ER (GLUCOPHAGE-XR) 500 MG 24 hr tablet Take 2 tablets by mouth 2 (Two) Times a Day. 5/11/23   Rashid Acosta DO   metoprolol succinate XL (TOPROL-XL) 50 MG 24 hr tablet Take 1 tablet by mouth Daily. 5/11/23   Rashid Acosta DO   pantoprazole (PROTONIX) 40 MG EC tablet Take 1 tablet by mouth Daily. 5/11/23   Rashid Acosta DO   Synthroid 200 MCG tablet Take 1 tablet by mouth Daily. 5/11/23   Rashid Acosta DO   Tirzepatide (Mounjaro) 7.5 MG/0.5ML solution pen-injector Inject 0.5 mL under the skin into the appropriate area as directed 1 (One) Time Per Week. 5/11/23   Rashid Acosta DO   vitamin D (ERGOCALCIFEROL) 1.25 MG (10873 UT) capsule capsule Take 1 capsule by mouth 1 (One) Time Per Week. 5/11/23   Rashid Acosta DO        Social History:   Social History     Tobacco Use   • Smoking status: Every Day     Packs/day: 1.00     Years: 41.00     Pack years: 41.00     Types: Cigarettes     Start date: 1980   • Smokeless tobacco: Never   Vaping Use   • Vaping Use: Former   • Substances: Nicotine, Flavoring   Substance Use Topics   •  "Alcohol use: Never   • Drug use: Never         Review of Systems:  Review of Systems   Constitutional: Negative for chills and fever.   HENT: Negative for congestion, ear pain and sore throat.    Eyes: Negative for pain.   Respiratory: Negative for cough, chest tightness and shortness of breath.    Cardiovascular: Negative for chest pain.   Gastrointestinal: Negative for abdominal pain, diarrhea, nausea and vomiting.   Genitourinary: Negative for flank pain and hematuria.   Musculoskeletal: Negative for joint swelling.   Skin: Positive for rash. Negative for pallor.   Neurological: Negative for seizures and headaches.   All other systems reviewed and are negative.       Physical Exam:  /65   Pulse 67   Temp 98.7 °F (37.1 °C) (Oral)   Resp 16   Ht 172.7 cm (68\")   Wt (!) 139 kg (307 lb 1.6 oz)   SpO2 98%   BMI 46.69 kg/m²     Physical Exam  Vitals and nursing note reviewed.   Constitutional:       General: She is not in acute distress.     Appearance: Normal appearance. She is not toxic-appearing.   HENT:      Head: Normocephalic and atraumatic.      Jaw: There is normal jaw occlusion.   Eyes:      General: Lids are normal.      Extraocular Movements: Extraocular movements intact.      Conjunctiva/sclera: Conjunctivae normal.      Pupils: Pupils are equal, round, and reactive to light.   Cardiovascular:      Rate and Rhythm: Normal rate and regular rhythm.      Pulses: Normal pulses.      Heart sounds: Normal heart sounds.   Pulmonary:      Effort: Pulmonary effort is normal. No respiratory distress.      Breath sounds: Normal breath sounds. No stridor. No wheezing or rhonchi.   Abdominal:      General: Abdomen is flat.      Palpations: Abdomen is soft.      Tenderness: There is no abdominal tenderness. There is no guarding or rebound.   Musculoskeletal:         General: Normal range of motion.      Cervical back: Normal range of motion and neck supple.      Right lower leg: No edema.      Left lower " leg: No edema.   Skin:     General: Skin is warm and dry.      Findings: Rash (Patient has a diffuse urticarial rash over bilateral upper extremities and torso with associated erythema) present.   Neurological:      Mental Status: She is alert and oriented to person, place, and time. Mental status is at baseline.   Psychiatric:         Mood and Affect: Mood normal.                  Procedures:  Procedures      Medical Decision Making:      Comorbidities that affect care:    Diabetes    External Notes reviewed:    Previous Clinic Note: Recent family medicine visit with Dr. Acosta      The following orders were placed and all results were independently analyzed by me:  Orders Placed This Encounter   Procedures   • XR Chest 1 View   • Comprehensive Metabolic Panel   • CBC Auto Differential   • Magnesium   • Basic Metabolic Panel   • CBC Auto Differential   • Magnesium   • Phosphorus   • Allergen Food Panel   • IgE   • Phosphorus   • Diet: Diabetic Diets, Gastrointestinal Diets; Consistent Carbohydrate; Gluten-Sensitive; Texture: Regular Texture (IDDSI 7); Fluid Consistency: Thin (IDDSI 0)   • Vital Signs   • Activity - Ad Jillian   • Up in Chair   • Intake & Output   • Weigh Patient   • Tobacco Cessation Education   • Saline Lock & Maintain IV Access   • Place Sequential Compression Device   • Maintain Sequential Compression Device   • Code Status and Medical Interventions:   • Inpatient Pulmonology Consult   • Inpatient Case Management  Consult   • Incentive Spirometry   • NIPPV (CPAP or BIPAP)   • POC Glucose 4x Daily AC & at Bedtime   • POC Glucose Once   • POC Glucose Once   • POC Glucose Once   • POC Glucose Once   • POC Glucose Once   • ECG 12 Lead   • Insert Peripheral IV   • Inpatient Admission   • CBC & Differential       Medications Given in the Emergency Department:  Medications   sodium chloride 0.9 % flush 10 mL (10 mL Intravenous Given 5/21/23 0808)   sodium chloride 0.9 % flush 10 mL (has no  administration in time range)   sodium chloride 0.9 % infusion 40 mL (has no administration in time range)   acetaminophen (TYLENOL) tablet 650 mg (has no administration in time range)     Or   acetaminophen (TYLENOL) 160 MG/5ML solution 650 mg (has no administration in time range)     Or   acetaminophen (TYLENOL) suppository 650 mg (has no administration in time range)   aluminum-magnesium hydroxide-simethicone (MAALOX MAX) 400-400-40 MG/5ML suspension 15 mL (has no administration in time range)   sennosides-docusate (PERICOLACE) 8.6-50 MG per tablet 2 tablet (2 tablets Oral Not Given 5/21/23 0807)     And   polyethylene glycol (MIRALAX) packet 17 g (has no administration in time range)     And   bisacodyl (DULCOLAX) EC tablet 5 mg (has no administration in time range)     And   bisacodyl (DULCOLAX) suppository 10 mg (has no administration in time range)   ondansetron (ZOFRAN) tablet 4 mg (has no administration in time range)     Or   ondansetron (ZOFRAN) injection 4 mg (has no administration in time range)   melatonin tablet 5 mg (has no administration in time range)   lactated ringers infusion (100 mL/hr Intravenous New Bag 5/21/23 0436)   dextrose (GLUTOSE) oral gel 15 g (has no administration in time range)   dextrose (D50W) (25 g/50 mL) IV injection 25 g (has no administration in time range)   glucagon (GLUCAGEN) injection 1 mg (has no administration in time range)   Insulin Lispro (humaLOG) injection 3-14 Units (8 Units Subcutaneous Given 5/21/23 0806)   famotidine (PEPCID) injection 20 mg (20 mg Intravenous Given 5/21/23 0806)   diphenhydrAMINE (BENADRYL) injection 50 mg (50 mg Intravenous Given 5/21/23 0806)   predniSONE (DELTASONE) tablet 40 mg (40 mg Oral Given 5/21/23 0807)   famotidine (PEPCID) injection 20 mg (20 mg Intravenous Given 5/20/23 0218)   montelukast (SINGULAIR) tablet 10 mg (10 mg Oral Given 5/20/23 0216)   ondansetron (ZOFRAN) injection 4 mg (4 mg Intravenous Given 5/20/23 3620)    prochlorperazine (COMPAZINE) injection 10 mg (10 mg Intravenous Given 5/20/23 0605)   EPINEPHrine (ADRENALIN) injection 0.3 mg (0.3 mg Intramuscular Given 5/20/23 0620)   methylPREDNISolone sodium succinate (SOLU-Medrol) injection 125 mg (125 mg Intravenous Given 5/20/23 0621)   sodium chloride 0.9 % bolus 1,000 mL (0 mL Intravenous Stopped 5/20/23 0800)   magnesium sulfate 4g/100mL (PREMIX) infusion (4 g Intravenous New Bag 5/20/23 0822)   insulin detemir (LEVEMIR) injection 15 Units (15 Units Subcutaneous Given 5/21/23 0805)        ED Course:    ED Course as of 05/21/23 0811   Sat May 20, 2023   0639 The patient was initially treated with antihistamines and mast cell stabilizer, she requested no steroids as she is a diabetic and has a difficult time controlling her blood sugar on steroids. [JS]   0640 Patient's rash improved initially but then she began to feel nauseous she was treated with antiemetics at this time.  And then she began to feel lightheaded and had downtrending blood pressures. [JS]   0640   Due to concern for increasing vasodilation patient was treated with IM epinephrine and IV fluids and steroids.  She had improvement of her blood pressure with these treatments. [JS]   0641 Patient was discussed with hospitalist for admission for further evaluation and treatment. [JS]      ED Course User Index  [JS] Mele Grant MD       Labs:    Lab Results (last 24 hours)     Procedure Component Value Units Date/Time    Allergen Food Panel [[id number]] Collected: 05/20/23 1027    Specimen: Blood Updated: 05/20/23 1031    IgE [[id number]] Collected: 05/20/23 1027    Specimen: Blood Updated: 05/20/23 1031    POC Glucose Once [[id number]]  (Abnormal) Collected: 05/20/23 1154    Specimen: Blood Updated: 05/20/23 1431     Glucose 329 mg/dL      Comment: Serial Number: [id number]:  [id number]       POC Glucose Once [[id number]]  (Abnormal) Collected: 05/20/23 1616    Specimen: Blood Updated: 05/20/23 1621      Glucose 344 mg/dL      Comment: Serial Number: [id number]:  [id number]       POC Glucose Once [[id number]]  (Abnormal) Collected: 05/20/23 2045    Specimen: Blood Updated: 05/20/23 2047     Glucose 323 mg/dL      Comment: Serial Number: [id number]:  [id number]       Basic Metabolic Panel [[id number]]  (Abnormal) Collected: 05/21/23 0524    Specimen: Blood Updated: 05/21/23 0620     Glucose 308 mg/dL      BUN 13 mg/dL      Creatinine 0.70 mg/dL      Sodium 133 mmol/L      Potassium 4.4 mmol/L      Chloride 100 mmol/L      CO2 23.9 mmol/L      Calcium 8.6 mg/dL      BUN/Creatinine Ratio 18.6     Anion Gap 9.1 mmol/L      eGFR 97.9 mL/min/1.73     Narrative:      GFR Normal >60  Chronic Kidney Disease <60  Kidney Failure <15      CBC Auto Differential [[id number]]  (Abnormal) Collected: 05/21/23 0524    Specimen: Blood Updated: 05/21/23 0557     WBC 12.75 10*3/mm3      RBC 4.72 10*6/mm3      Hemoglobin 12.0 g/dL      Hematocrit 36.8 %      MCV 78.0 fL      MCH 25.4 pg      MCHC 32.6 g/dL      RDW 15.8 %      RDW-SD 44.4 fl      MPV 10.3 fL      Platelets 295 10*3/mm3      Neutrophil % 86.3 %      Lymphocyte % 8.9 %      Monocyte % 3.8 %      Eosinophil % 0.1 %      Basophil % 0.2 %      Immature Grans % 0.7 %      Neutrophils, Absolute 11.02 10*3/mm3      Lymphocytes, Absolute 1.13 10*3/mm3      Monocytes, Absolute 0.48 10*3/mm3      Eosinophils, Absolute 0.01 10*3/mm3      Basophils, Absolute 0.02 10*3/mm3      Immature Grans, Absolute 0.09 10*3/mm3      nRBC 0.0 /100 WBC     Magnesium [[id number]]  (Normal) Collected: 05/21/23 0524    Specimen: Blood Updated: 05/21/23 0621     Magnesium 2.3 mg/dL     Phosphorus [[id number]]  (Normal) Collected: 05/21/23 0524    Specimen: Blood Updated: 05/21/23 0705     Phosphorus 3.0 mg/dL     POC Glucose Once [[id number]]  (Abnormal) Collected: 05/21/23 0745    Specimen: Blood Updated: 05/21/23 0757     Glucose 275 mg/dL      Comment: Serial Number: [id number]:   [id number]              Imaging:    No Radiology Exams Resulted Within Past 24 Hours      Differential Diagnosis and Discussion:    Rash: Differential diagnosis includes but is not limited to sepsis, cellulitis, [place] Spotted Fever, meningitis, meningococcemia, Varicella, Strep infection, dermatitis, allergic reaction, Lyme disease, and toxic shock syndrome.        [facility]     Critical Care Note: Total Critical Care time of 35 minutes. Total critical care time documented does not include time spent on separately billed procedures for services of nurses or physician assistants. I personally saw and examined the patient. I have reviewed all diagnostic interpretations and treatment plans as written. I was present for the key portions of any procedures performed and the inclusive time noted in any critical care statement. Critical care time includes patient management by me, time spent at the patients bedside,  time to review lab and imaging results, discussing patient care, documentation in the medical record, and time spent with family or caregiver.    Patient Care Considerations:    STEROIDS: I considered prescribing steroids, however I did not as this may adversely affect the patient's blood sugar in the setting of diabetes.      Consultants/Shared Management Plan:    Hospitalist: I have discussed the case with The hospitalist who agrees to accept the patient for admission.    Social Determinants of Health:    Patient is independent, reliable, and has access to care.       Disposition and Care Coordination:    Admit:   Through independent evaluation of the patient's history, physical, and imperical data, the patient meets criteria for observation/admission to the hospital.        Final diagnoses:   Anaphylaxis, initial encounter        ED Disposition     ED Disposition   Decision to Admit    Condition   --    Comment   Level of Care: Critical Care [6]   Diagnosis: Anaphylaxis, initial encounter [216396]   Admitting  Physician: FELICIA HARRIS [[id number]]   Attending Physician: FELICIA HARRIS [[id number]]   Isolate for COVID?: No [0]   Certification: I Certify That Inpatient Hospital Services Are Medically Necessary For Greater Than 2 Midnights               This medical record created using voice recognition software.           Mele Grant MD  05/21/23 0811

## 2023-05-20 NOTE — PROGRESS NOTES
Patient seen and evaluated on this morning.  Chart is been reviewed.  Patient denies any shortness of breath difficulty with swallowing, rash or itching at this time.  She reports she is actually feeling much better.  Blood pressures currently 115/64.  We will continue IV fluid resuscitative measures.  Magnesium is noted to be 1.4 she is currently receiving IV magnesium.  Continue to monitor in the ICU.

## 2023-05-20 NOTE — PLAN OF CARE
Goal Outcome Evaluation:                      Patient has done well this shift. No further complaints of allergic reaction symptoms. Blood sugar remains high. Prednisone for management of reaction. CPAP while resting

## 2023-05-21 ENCOUNTER — READMISSION MANAGEMENT (OUTPATIENT)
Dept: CALL CENTER | Facility: HOSPITAL | Age: 63
End: 2023-05-21
Payer: MEDICARE

## 2023-05-21 VITALS
WEIGHT: 293 LBS | RESPIRATION RATE: 16 BRPM | HEIGHT: 68 IN | OXYGEN SATURATION: 98 % | SYSTOLIC BLOOD PRESSURE: 126 MMHG | BODY MASS INDEX: 44.41 KG/M2 | DIASTOLIC BLOOD PRESSURE: 65 MMHG | TEMPERATURE: 97.3 F | HEART RATE: 67 BPM

## 2023-05-21 LAB
ANION GAP SERPL CALCULATED.3IONS-SCNC: 9.1 MMOL/L (ref 5–15)
BASOPHILS # BLD AUTO: 0.02 10*3/MM3 (ref 0–0.2)
BASOPHILS NFR BLD AUTO: 0.2 % (ref 0–1.5)
BUN SERPL-MCNC: 13 MG/DL (ref 8–23)
BUN/CREAT SERPL: 18.6 (ref 7–25)
CALCIUM SPEC-SCNC: 8.6 MG/DL (ref 8.6–10.5)
CHLORIDE SERPL-SCNC: 100 MMOL/L (ref 98–107)
CO2 SERPL-SCNC: 23.9 MMOL/L (ref 22–29)
CREAT SERPL-MCNC: 0.7 MG/DL (ref 0.57–1)
DEPRECATED RDW RBC AUTO: 44.4 FL (ref 37–54)
EGFRCR SERPLBLD CKD-EPI 2021: 97.9 ML/MIN/1.73
EOSINOPHIL # BLD AUTO: 0.01 10*3/MM3 (ref 0–0.4)
EOSINOPHIL NFR BLD AUTO: 0.1 % (ref 0.3–6.2)
ERYTHROCYTE [DISTWIDTH] IN BLOOD BY AUTOMATED COUNT: 15.8 % (ref 12.3–15.4)
GLUCOSE BLDC GLUCOMTR-MCNC: 248 MG/DL (ref 70–99)
GLUCOSE BLDC GLUCOMTR-MCNC: 275 MG/DL (ref 70–99)
GLUCOSE SERPL-MCNC: 308 MG/DL (ref 65–99)
HCT VFR BLD AUTO: 36.8 % (ref 34–46.6)
HGB BLD-MCNC: 12 G/DL (ref 12–15.9)
IMM GRANULOCYTES # BLD AUTO: 0.09 10*3/MM3 (ref 0–0.05)
IMM GRANULOCYTES NFR BLD AUTO: 0.7 % (ref 0–0.5)
LYMPHOCYTES # BLD AUTO: 1.13 10*3/MM3 (ref 0.7–3.1)
LYMPHOCYTES NFR BLD AUTO: 8.9 % (ref 19.6–45.3)
MAGNESIUM SERPL-MCNC: 2.3 MG/DL (ref 1.6–2.4)
MCH RBC QN AUTO: 25.4 PG (ref 26.6–33)
MCHC RBC AUTO-ENTMCNC: 32.6 G/DL (ref 31.5–35.7)
MCV RBC AUTO: 78 FL (ref 79–97)
MONOCYTES # BLD AUTO: 0.48 10*3/MM3 (ref 0.1–0.9)
MONOCYTES NFR BLD AUTO: 3.8 % (ref 5–12)
NEUTROPHILS NFR BLD AUTO: 11.02 10*3/MM3 (ref 1.7–7)
NEUTROPHILS NFR BLD AUTO: 86.3 % (ref 42.7–76)
NRBC BLD AUTO-RTO: 0 /100 WBC (ref 0–0.2)
PHOSPHATE SERPL-MCNC: 3 MG/DL (ref 2.5–4.5)
PLATELET # BLD AUTO: 295 10*3/MM3 (ref 140–450)
PMV BLD AUTO: 10.3 FL (ref 6–12)
POTASSIUM SERPL-SCNC: 4.4 MMOL/L (ref 3.5–5.2)
RBC # BLD AUTO: 4.72 10*6/MM3 (ref 3.77–5.28)
SODIUM SERPL-SCNC: 133 MMOL/L (ref 136–145)
WBC NRBC COR # BLD: 12.75 10*3/MM3 (ref 3.4–10.8)

## 2023-05-21 PROCEDURE — 25010000002 DIPHENHYDRAMINE PER 50 MG: Performed by: PHYSICIAN ASSISTANT

## 2023-05-21 PROCEDURE — 63710000001 INSULIN LISPRO (HUMAN) PER 5 UNITS: Performed by: PHYSICIAN ASSISTANT

## 2023-05-21 PROCEDURE — 94660 CPAP INITIATION&MGMT: CPT

## 2023-05-21 PROCEDURE — 63710000001 INSULIN DETEMIR PER 5 UNITS: Performed by: INTERNAL MEDICINE

## 2023-05-21 PROCEDURE — 63710000001 PREDNISONE PER 1 MG: Performed by: PHYSICIAN ASSISTANT

## 2023-05-21 PROCEDURE — 80048 BASIC METABOLIC PNL TOTAL CA: CPT | Performed by: PHYSICIAN ASSISTANT

## 2023-05-21 PROCEDURE — 99239 HOSP IP/OBS DSCHRG MGMT >30: CPT | Performed by: INTERNAL MEDICINE

## 2023-05-21 PROCEDURE — 85025 COMPLETE CBC W/AUTO DIFF WBC: CPT | Performed by: PHYSICIAN ASSISTANT

## 2023-05-21 PROCEDURE — 84100 ASSAY OF PHOSPHORUS: CPT | Performed by: PHYSICIAN ASSISTANT

## 2023-05-21 PROCEDURE — 94799 UNLISTED PULMONARY SVC/PX: CPT

## 2023-05-21 PROCEDURE — 83735 ASSAY OF MAGNESIUM: CPT | Performed by: PHYSICIAN ASSISTANT

## 2023-05-21 PROCEDURE — 82948 REAGENT STRIP/BLOOD GLUCOSE: CPT

## 2023-05-21 PROCEDURE — 99232 SBSQ HOSP IP/OBS MODERATE 35: CPT | Performed by: INTERNAL MEDICINE

## 2023-05-21 RX ORDER — PREDNISONE 20 MG/1
40 TABLET ORAL DAILY
Qty: 8 TABLET | Refills: 0 | Status: SHIPPED | OUTPATIENT
Start: 2023-05-22 | End: 2023-05-26

## 2023-05-21 RX ORDER — PREDNISONE 20 MG/1
40 TABLET ORAL DAILY
Qty: 8 TABLET | Refills: 0 | Status: SHIPPED | OUTPATIENT
Start: 2023-05-22 | End: 2023-05-21 | Stop reason: SDUPTHER

## 2023-05-21 RX ADMIN — DIPHENHYDRAMINE HYDROCHLORIDE 50 MG: 50 INJECTION INTRAMUSCULAR; INTRAVENOUS at 08:06

## 2023-05-21 RX ADMIN — INSULIN LISPRO 8 UNITS: 100 INJECTION, SOLUTION INTRAVENOUS; SUBCUTANEOUS at 08:06

## 2023-05-21 RX ADMIN — Medication 10 ML: at 08:08

## 2023-05-21 RX ADMIN — PREDNISONE 40 MG: 20 TABLET ORAL at 08:07

## 2023-05-21 RX ADMIN — FAMOTIDINE 20 MG: 10 INJECTION INTRAVENOUS at 08:06

## 2023-05-21 RX ADMIN — INSULIN LISPRO 5 UNITS: 100 INJECTION, SOLUTION INTRAVENOUS; SUBCUTANEOUS at 12:24

## 2023-05-21 RX ADMIN — INSULIN DETEMIR 15 UNITS: 100 INJECTION, SOLUTION SUBCUTANEOUS at 08:05

## 2023-05-21 RX ADMIN — SODIUM CHLORIDE, POTASSIUM CHLORIDE, SODIUM LACTATE AND CALCIUM CHLORIDE 100 ML/HR: 600; 310; 30; 20 INJECTION, SOLUTION INTRAVENOUS at 04:36

## 2023-05-21 RX ADMIN — DIPHENHYDRAMINE HYDROCHLORIDE 50 MG: 50 INJECTION INTRAMUSCULAR; INTRAVENOUS at 00:45

## 2023-05-21 NOTE — PLAN OF CARE
Goal Outcome Evaluation:   Patient very compliant with Cpap wore all night with no issues

## 2023-05-21 NOTE — PLAN OF CARE
Goal Outcome Evaluation:   Pt compliant with CPAP use.  She is wearing with all naps and sleep.

## 2023-05-21 NOTE — DISCHARGE SUMMARY
[facility]         HOSPITALIST  DISCHARGE SUMMARY    Patient Name: Ifrah Robertson  : 1960  MRN: [id number]    Date of Admission: 2023  Date of Discharge:  2023  Primary Care Physician: Rashid Acosta DO    Consults     Date and Time Order Name Status Description    2023  6:52 AM Inpatient Pulmonology Consult Completed           Active and Resolved Hospital Problems:  Anaphylactic shock  Diabetes    Hospital Course     Hospital Course:  Ifrah Robertson is a 62 y.o. female who presented to the emergency department for evaluation of allergic reaction    Patient started with urticarial rash to bilateral upper extremities and torso became very itchy following consumption of [place] onion soup.  Patient states around 1 year ago she had an issue with soup.  Patient attempted Benadryl at home with little improvement.  Called EMS and arrived in the hospital.  Patient denies any trouble breathing or swelling of throat.  Blood pressure started dropping in the emergency department, patient was started on IV epinephrine.  Patient's symptoms improved and was able to discharge on 2023.  Patient was instructed to follow-up with allergy and immunology for further testing as an outpatient.  Given concern gluten was etiology, patient was educated to avoid all gluten in the future.  Patient sent out on a short prednisone burst given the amount of steroid she was given inpatient.  Patient seen on date of discharge, clinically and hemodynamically stable.  Patient provided concerning signs and symptoms prompting immediate medical attention, patient understanding and agreeable    DISCHARGE Follow Up Recommendations for labs and diagnostics:   Follow-up with primary care physician  Follow-up with allergy and immunology    IgE and allergen food panel pending on discharge      Day of Discharge     Vital Signs:  Temp:  [97.3 °F (36.3 °C)-98.7 °F (37.1 °C)] 97.3 °F (36.3 °C)  Heart Rate:   [67-82] 67  Resp:  [16-20] 16  BP: (109-150)/(48-84) 126/65  Physical Exam:    Constitutional: Awake, alert, no acute distress   Eyes: Pupils equal, sclerae anicteric, no conjunctival injection   HENT: NCAT, mucous membranes moist   Neck: Supple, no thyromegaly, no lymphadenopathy, trachea midline   Respiratory: Clear to auscultation bilaterally, nonlabored respirations    Cardiovascular: RRR, no murmurs, rubs, or gallops, palpable pedal pulses bilaterally   Gastrointestinal: Positive bowel sounds, soft, nontender, nondistended   Musculoskeletal: No bilateral ankle edema, no clubbing or cyanosis to extremities   Neurologic: Oriented x 3, strength symmetric in all extremities, Cranial Nerves grossly intact to confrontation, speech clear   Skin: No rashes     Discharge Details        Discharge Medications      New Medications      Instructions Start Date   predniSONE 20 MG tablet  Commonly known as: DELTASONE   40 mg, Oral, Daily   Start Date: May 22, 2023        Changes to Medications      Instructions Start Date   Aspirin Low Dose 81 MG EC tablet  Generic drug: aspirin  What changed: how much to take   TAKE ONE TABLET BY MOUTH ONCE DAILY      metFORMIN  MG 24 hr tablet  Commonly known as: GLUCOPHAGE-XR  What changed:   · how much to take  · when to take this   1,000 mg, Oral, 2 Times Daily         Continue These Medications      Instructions Start Date   diphenhydrAMINE 50 MG capsule  Commonly known as: BENADRYL   50 mg, Oral, Nightly PRN      EPINEPHrine 0.3 MG/0.3ML solution auto-injector injection  Commonly known as: EPIPEN   0.3 mg, Intramuscular, Once      escitalopram 20 MG tablet  Commonly known as: LEXAPRO   20 mg, Oral, Daily      fluticasone 50 MCG/ACT nasal spray  Commonly known as: Flonase   2 sprays, Nasal, Daily      metoprolol succinate XL 50 MG 24 hr tablet  Commonly known as: TOPROL-XL   50 mg, Oral, Daily      Mounjaro 7.5 MG/0.5ML solution pen-injector  Generic drug: Tirzepatide   7.5 mg,  Subcutaneous, Weekly      pantoprazole 40 MG EC tablet  Commonly known as: PROTONIX   40 mg, Oral, Daily      Synthroid 200 MCG tablet  Generic drug: levothyroxine   200 mcg, Oral, Daily      vitamin D 1.25 MG (62444 UT) capsule capsule  Commonly known as: ERGOCALCIFEROL   50,000 Units, Oral, Weekly             Allergies   Allergen Reactions   • Tetracycline Hcl Unknown - High Severity     INFECTED BLOOD   • Valacyclovir Hcl Hives   • Latex Rash     Rash    • Azithromycin Hives   • Cefaclor Unknown - Low Severity   • Gluten Meal Other (See Comments)   • Hydrocodone-Acetaminophen Unknown - Low Severity   • Indomethacin Unknown - Low Severity   • Ketorolac Tromethamine Unknown - Low Severity   • Trazodone Dizziness       Discharge Disposition:  Home or Self Care    Diet:  Hospital:No active diet order      Discharge Activity:   Activity Instructions     Activity as Tolerated            CODE STATUS:  Code Status and Medical Interventions:   Ordered at: 05/20/23 0633     Code Status (Patient has no pulse and is not breathing):    CPR (Attempt to Resuscitate)     Medical Interventions (Patient has pulse or is breathing):    Full Support         Future Appointments   Date Time Provider Department [place]   5/22/2023  1:00 PM Rashid Acosta DO [facility]   8/10/2023  1:15 PM Bk oWod DPM [facility] POD [facility]   8/16/2023  4:00 PM [address]   11/20/2023  2:00 PM Sujey Sanchez [facility] None       Additional Instructions for the Follow-ups that You Need to Schedule     Discharge Follow-up with PCP   As directed       Currently Documented PCP:    Rashid Acosta DO    PCP Phone Number:    [phone]     Follow Up Details: In less than one week               Pertinent  and/or Most Recent Results     PROCEDURES:       LAB RESULTS:      Lab 05/21/23  0524 05/20/23  0643   WBC 12.75* 19.38*   HEMOGLOBIN 12.0 15.0   HEMATOCRIT 36.8 46.6   PLATELETS 295 454*   NEUTROS ABS 11.02* 16.14*    IMMATURE GRANS (ABS) 0.09* 0.23*   LYMPHS ABS 1.13 2.15   MONOS ABS 0.48 0.63   EOS ABS 0.01 0.15   MCV 78.0* 79.1         Lab 05/21/23  0524 05/20/23  0643   SODIUM 133* 131*   POTASSIUM 4.4 4.7   CHLORIDE 100 95*   CO2 23.9 21.7*   ANION GAP 9.1 14.3   BUN 13 14   CREATININE 0.70 1.13*   EGFR 97.9 55.1*   GLUCOSE 308* 394*   CALCIUM 8.6 7.9*   MAGNESIUM 2.3 1.4*   PHOSPHORUS 3.0 4.2         Lab 05/20/23  0643   TOTAL PROTEIN 6.1   ALBUMIN 3.3*   GLOBULIN 2.8   ALT (SGPT) 8   AST (SGOT) 11   BILIRUBIN 0.8   ALK PHOS 62                     Brief Urine Lab Results  (Last result in the past 365 days)      Color   Clarity   Blood   Leuk Est   Nitrite   Protein   CREAT   Urine HCG        12/21/22 1605             90.0             Microbiology Results (last 10 days)     ** No results found for the last 240 hours. **          XR Chest 1 View    Result Date: 5/20/2023  Impression:   No acute infiltrate is appreciated.     Please note that portions of this note were completed with a voice recognition program.  CANDY TAVERAS JR, MD       Electronically Signed and Approved By: CANDY TAVERAS JR, MD on 5/20/2023 at 7:21                            Labs Pending at Discharge:  Pending Labs     Order Current Status    Allergen Food Panel In process    IgE In process            Time spent on Discharge including face to face service:  36 minutes    Electronically signed by Shaji Bundy MD, 05/21/23, 5:40 PM EDT.

## 2023-05-21 NOTE — PLAN OF CARE
Goal Outcome Evaluation:  Plan of Care Reviewed With: patient        Progress: improving  Outcome Evaluation: Patient discharging home, discharge education complete.

## 2023-05-21 NOTE — PROGRESS NOTES
[facility]     Progress Note    Patient Name: Ifrah Robertson  : 1960  MRN: [id number]  Primary Care Physician:  Rashid Acosta DO  Date of admission: 2023    Subjective   Subjective     Chief Complaint: For consultation anaphylactic shock    History of Present Illness  Patient Reports improvement  Lip swelling has resolved  Rash is resolved  No complaints of shortness of breath    Review of Systems    Objective   Objective     Vitals:   Temp:  [97.8 °F (36.6 °C)-98.9 °F (37.2 °C)] 97.9 °F (36.6 °C)  Heart Rate:  [67-82] 67  Resp:  [16-20] 16  BP: (109-150)/(48-91) 126/65    Physical Exam   Vital Signs Reviewed  General WDWN, Alert, NAD.    Chest:  good aeration, clear to auscultation bilaterally, tympanic to percussion bilaterally, no work of breathing noted  CV: RRR, no MGR, .  EXT:  no clubbing, no cyanosis, no edema, no joint tenderness  Neuro:  A&Ox3, CN grossly intact, no focal deficits.  Skin: No rashes or lesions noted  Result Review    Result Review:  I have personally reviewed the results from the time of this admission to 2023 11:59 EDT and agree with these findings:  []  Laboratory list / accordion  []  Microbiology  []  Radiology  []  EKG/Telemetry   []  Cardiology/Vascular   []  Pathology  []  Old records  []  Other:      Assessment & Plan   Assessment / Plan     Brief Patient Summary:  Ifrah Robertson is a 62 y.o. female who here with anaphylactic shock    Active Hospital Problems:  Active Hospital Problems    Diagnosis    • **Anaphylaxis, initial encounter      Plan:   Improved  Blood pressure improved  Rash improved  Lip swelling and eye swelling resolved  Okay from my standpoint to discharge patient home  We will give education on use of epinephrine.  Instructed patient to avoid all gluten products  Instructed patient to follow-up with allergy immunology for further testing    DVT prophylaxis:  Mechanical DVT prophylaxis orders are present.    CODE STATUS:    Code Status  (Patient has no pulse and is not breathing): CPR (Attempt to Resuscitate)  Medical Interventions (Patient has pulse or is breathing): Full Support      Fitz Copeland DO    Electronically signed by Fitz Copeland DO, 05/21/23, 12:02 PM EDT.

## 2023-05-21 NOTE — OUTREACH NOTE
Prep Survey    Flowsheet Row Responses   [facility]? [facility]   Is LACE score < 7 ? No   Eligibility [facility]   Date of Admission 05/20/23   Date of Discharge 05/21/23   Discharge Disposition Home or Self Care   Discharge diagnosis Anaphylaxis,   Does the patient have one of the following disease processes/diagnoses(primary or secondary)? Other   Does the patient have Home health ordered? No   Is there a DME ordered? No   Prep survey completed? Yes          JOCELYN JETT - Registered Nurse

## 2023-05-22 ENCOUNTER — TRANSITIONAL CARE MANAGEMENT TELEPHONE ENCOUNTER (OUTPATIENT)
Dept: CALL CENTER | Facility: HOSPITAL | Age: 63
End: 2023-05-22
Payer: MEDICARE

## 2023-05-22 LAB — IGE SERPL-ACNC: 242 KU/L

## 2023-05-22 NOTE — OUTREACH NOTE
Call Center TCM Note    Flowsheet Row Responses   [facility] [place] patient discharged from? [facility]   Does the patient have one of the following disease processes/diagnoses(primary or secondary)? Other   TCM attempt successful? Yes   Call start time 1406   Call end time 1408   Discharge diagnosis Anaphylaxis,   Person spoke with today (if not patient) and relationship pt   Meds reviewed with patient/caregiver? Yes   Is the patient having any side effects they believe may be caused by any medication additions or changes? No   Does the patient have all medications ordered at discharge? Yes   Is the patient taking all medications as directed (includes completed medication regime)? Yes   Does the patient have an appointment with their PCP within 7 days of discharge? Yes  [5/30/2023 at  4:00 PM]   Psychosocial issues? No   Did the patient receive a copy of their discharge instructions? Yes   Nursing interventions Reviewed instructions with patient   What is the patient's perception of their health status since discharge? Improving   Is the patient/caregiver able to teach back signs and symptoms related to disease process for when to call PCP? Yes   Is the patient/caregiver able to teach back signs and symptoms related to disease process for when to call 911? Yes   Is the patient/caregiver able to teach back the hierarchy of who to call/visit for symptoms/problems? PCP, Specialist, Home health nurse, Urgent Care, ED, 911 Yes   If the patient is a current smoker, are they able to teach back resources for cessation? Not a smoker   TCM call completed? Yes   Wrap up additional comments Pt states she is doing so much better. Reviewed AVS/medication with pt. Pt verified PCP hospital fu appt on 5/30/23.   Call end time 1408   Would this patient benefit from a Referral to Amb Social Work? No   Is the patient interested in additional calls from an ambulatory ?  NOTE:  applies to high risk patients requiring additional  follow-up. Riddhi Beckman RN    5/22/2023, 14:09 EDT

## 2023-05-30 PROBLEM — K04.7 TOOTH ABSCESS: Status: ACTIVE | Noted: 2023-05-30

## 2023-05-30 PROBLEM — Z79.4 TYPE 2 DIABETES MELLITUS WITH DIABETIC NEUROPATHY, WITH LONG-TERM CURRENT USE OF INSULIN: Status: ACTIVE | Noted: 2021-10-18

## 2023-05-30 PROBLEM — K80.50 BILIARY COLIC: Status: ACTIVE | Noted: 2023-05-30

## 2023-05-30 PROBLEM — Z00.00 ENCOUNTER FOR SUBSEQUENT ANNUAL WELLNESS VISIT (AWV) IN MEDICARE PATIENT: Status: ACTIVE | Noted: 2022-01-03

## 2023-05-30 PROBLEM — E11.40 TYPE 2 DIABETES MELLITUS WITH DIABETIC NEUROPATHY, WITH LONG-TERM CURRENT USE OF INSULIN: Status: ACTIVE | Noted: 2021-10-18

## 2023-05-30 PROBLEM — E61.1 IRON DEFICIENCY: Status: ACTIVE | Noted: 2023-05-30

## 2023-05-30 LAB
BARLEY IGE QN: 0.14 KU/L
BEEF IGE QN: <0.1 KU/L
CABBAGE IGE QN: ABNORMAL KU/L
CARROT IGE QN: ABNORMAL KU/L
CHICKEN MEAT IGE QN: <0.1 KU/L
CODFISH IGE QN: <0.1 KU/L
CONV CLASS DESCRIPTION: ABNORMAL
CORN IGE QN: 0.23 KU/L
COW MILK IGE QN: 0.18 KU/L
CRAB IGE QN: <0.1 KU/L
EGG WHITE IGE QN: 0.25 KU/L
GRAPE IGE QN: ABNORMAL KU/L
GREEN PEPPER IGE QN: ABNORMAL KU/L
LETTUCE IGE QN: ABNORMAL KU/L
OAT IGE QN: ABNORMAL KU/L
ORANGE IGE QN: 0.16 KU/L
PEANUT IGE QN: 0.44 KU/L
PORK IGE QN: <0.1 KU/L
POTATO IGE QN: 0.24 KU/L
RICE IGE QN: 0.27 KU/L
RYE IGE QN: 0.3 KU/L
SHRIMP IGE QN: <0.1 KU/L
SOYBEAN IGE QN: 0.13 KU/L
TOMATO IGE QN: 0.42 KU/L
TUNA IGE QN: <0.1 KU/L
WHEAT IGE QN: 0.27 KU/L
WHITE BEAN IGE QN: ABNORMAL KU/L

## 2023-06-01 ENCOUNTER — LAB (OUTPATIENT)
Dept: LAB | Facility: HOSPITAL | Age: 63
End: 2023-06-01
Payer: MEDICARE

## 2023-06-01 DIAGNOSIS — E11.65 TYPE II DIABETES MELLITUS WITH HYPEROSMOLARITY, UNCONTROLLED: ICD-10-CM

## 2023-06-01 DIAGNOSIS — E03.9 ACQUIRED HYPOTHYROIDISM: Chronic | ICD-10-CM

## 2023-06-01 DIAGNOSIS — Z79.4 TYPE 2 DIABETES MELLITUS WITH HYPERGLYCEMIA, WITH LONG-TERM CURRENT USE OF INSULIN: Chronic | ICD-10-CM

## 2023-06-01 DIAGNOSIS — E61.1 IRON DEFICIENCY: Chronic | ICD-10-CM

## 2023-06-01 DIAGNOSIS — E11.00 TYPE II DIABETES MELLITUS WITH HYPEROSMOLARITY, UNCONTROLLED: ICD-10-CM

## 2023-06-01 DIAGNOSIS — T78.2XXD ANAPHYLAXIS, SUBSEQUENT ENCOUNTER: ICD-10-CM

## 2023-06-01 DIAGNOSIS — E11.65 TYPE 2 DIABETES MELLITUS WITH HYPERGLYCEMIA, WITH LONG-TERM CURRENT USE OF INSULIN: Chronic | ICD-10-CM

## 2023-06-01 LAB
ALBUMIN UR-MCNC: <1.2 MG/DL
HBA1C MFR BLD: 8.4 % (ref 4.8–5.6)

## 2023-06-01 PROCEDURE — 86003 ALLG SPEC IGE CRUDE XTRC EA: CPT

## 2023-06-01 PROCEDURE — 84443 ASSAY THYROID STIM HORMONE: CPT

## 2023-06-01 PROCEDURE — 84466 ASSAY OF TRANSFERRIN: CPT

## 2023-06-01 PROCEDURE — 84439 ASSAY OF FREE THYROXINE: CPT

## 2023-06-01 PROCEDURE — 83540 ASSAY OF IRON: CPT

## 2023-06-01 PROCEDURE — 36415 COLL VENOUS BLD VENIPUNCTURE: CPT

## 2023-06-01 PROCEDURE — 82043 UR ALBUMIN QUANTITATIVE: CPT

## 2023-06-01 PROCEDURE — 82728 ASSAY OF FERRITIN: CPT

## 2023-06-01 PROCEDURE — 83036 HEMOGLOBIN GLYCOSYLATED A1C: CPT

## 2023-06-02 LAB
FERRITIN SERPL-MCNC: 53.2 NG/ML (ref 13–150)
IRON 24H UR-MRATE: 36 MCG/DL (ref 37–145)
IRON SATN MFR SERPL: 8 % (ref 20–50)
T4 FREE SERPL-MCNC: 1.14 NG/DL (ref 0.93–1.7)
TIBC SERPL-MCNC: 425 MCG/DL (ref 298–536)
TRANSFERRIN SERPL-MCNC: 285 MG/DL (ref 200–360)
TSH SERPL DL<=0.05 MIU/L-ACNC: 5.81 UIU/ML (ref 0.27–4.2)

## 2023-06-05 ENCOUNTER — PATIENT MESSAGE (OUTPATIENT)
Dept: FAMILY MEDICINE CLINIC | Facility: CLINIC | Age: 63
End: 2023-06-05
Payer: MEDICARE

## 2023-06-05 RX ORDER — FERROUS SULFATE 325(65) MG
1 TABLET ORAL
Qty: 30 TABLET | Refills: 2 | Status: SHIPPED | OUTPATIENT
Start: 2023-06-05

## 2023-06-05 NOTE — TELEPHONE ENCOUNTER
From: Ifrah Robertson  To: Rashid Acosta  Sent: 6/5/2023 3:34 PM EDT  Subject: Iron test results    What are my results. It says it is low and is wondering what's the next plan for this

## 2023-06-08 LAB
BARLEY IGE QN: 0.19 KU/L
BEEF IGE QN: <0.1 KU/L
CABBAGE IGE QN: 0.27 KU/L
CARROT IGE QN: 0.19 KU/L
CHICKEN MEAT IGE QN: <0.1 KU/L
CODFISH IGE QN: <0.1 KU/L
CONV CLASS DESCRIPTION: ABNORMAL
CORN IGE QN: 0.32 KU/L
COW MILK IGE QN: 0.22 KU/L
CRAB IGE QN: <0.1 KU/L
EGG WHITE IGE QN: 0.21 KU/L
GRAPE IGE QN: <0.1 KU/L
GREEN PEPPER IGE QN: <0.1 KU/L
LETTUCE IGE QN: 0.27 KU/L
OAT IGE QN: 0.38 KU/L
ORANGE IGE QN: 0.23 KU/L
PEANUT IGE QN: 0.58 KU/L
PORK IGE QN: <0.1 KU/L
POTATO IGE QN: 0.35 KU/L
RICE IGE QN: 0.32 KU/L
RYE IGE QN: 0.29 KU/L
SHRIMP IGE QN: <0.1 KU/L
SOYBEAN IGE QN: 0.18 KU/L
TOMATO IGE QN: 0.58 KU/L
TUNA IGE QN: <0.1 KU/L
WHEAT IGE QN: 0.43 KU/L
WHITE BEAN IGE QN: 0.33 KU/L

## 2023-09-15 ENCOUNTER — PATIENT MESSAGE (OUTPATIENT)
Dept: FAMILY MEDICINE CLINIC | Facility: CLINIC | Age: 63
End: 2023-09-15
Payer: MEDICARE

## 2023-09-18 RX ORDER — CLOBETASOL PROPIONATE 0.5 MG/G
1 OINTMENT TOPICAL 2 TIMES DAILY
Qty: 45 G | Refills: 1 | Status: SHIPPED | OUTPATIENT
Start: 2023-09-18

## 2023-09-18 NOTE — TELEPHONE ENCOUNTER
From: Ifrah Robertson  To: Rashid Acosta  Sent: 9/15/2023 4:04 PM EDT  Subject: Refill    I am needing a refill on my clobetazole ointment for my lichen sclerosis. Thank you.

## 2023-10-03 ENCOUNTER — HOSPITAL ENCOUNTER (OUTPATIENT)
Dept: MAMMOGRAPHY | Facility: HOSPITAL | Age: 63
Discharge: HOME | End: 2023-10-03
Admitting: FAMILY MEDICINE
Payer: MEDICARE

## 2023-10-03 DIAGNOSIS — Z12.31 SCREENING MAMMOGRAM, ENCOUNTER FOR: ICD-10-CM

## 2023-10-03 PROCEDURE — 77063 BREAST TOMOSYNTHESIS BI: CPT

## 2023-10-03 PROCEDURE — 77067 SCR MAMMO BI INCL CAD: CPT

## 2023-10-23 ENCOUNTER — CLINICAL SUPPORT (OUTPATIENT)
Dept: FAMILY MEDICINE CLINIC | Facility: CLINIC | Age: 63
End: 2023-10-23
Payer: MEDICARE

## 2023-10-23 ENCOUNTER — LAB (OUTPATIENT)
Dept: LAB | Facility: HOSPITAL | Age: 63
End: 2023-10-23
Payer: MEDICARE

## 2023-10-23 DIAGNOSIS — E11.65 TYPE II DIABETES MELLITUS WITH HYPEROSMOLARITY, UNCONTROLLED: ICD-10-CM

## 2023-10-23 DIAGNOSIS — E11.00 TYPE II DIABETES MELLITUS WITH HYPEROSMOLARITY, UNCONTROLLED: ICD-10-CM

## 2023-10-23 DIAGNOSIS — R30.0 DYSURIA: Primary | ICD-10-CM

## 2023-10-23 LAB
ALBUMIN UR-MCNC: <1.2 MG/DL
BILIRUB BLD-MCNC: ABNORMAL MG/DL
CLARITY, POC: CLEAR
COLOR UR: YELLOW
EXPIRATION DATE: ABNORMAL
GLUCOSE UR STRIP-MCNC: NEGATIVE MG/DL
HBA1C MFR BLD: 7.7 % (ref 4.8–5.6)
KETONES UR QL: ABNORMAL
LEUKOCYTE EST, POC: NEGATIVE
Lab: ABNORMAL
NITRITE UR-MCNC: NEGATIVE MG/ML
PH UR: 6 [PH] (ref 5–8)
PROT UR STRIP-MCNC: NEGATIVE MG/DL
RBC # UR STRIP: NEGATIVE /UL
SP GR UR: 1.02 (ref 1–1.03)
UROBILINOGEN UR QL: NORMAL

## 2023-10-23 PROCEDURE — 83036 HEMOGLOBIN GLYCOSYLATED A1C: CPT

## 2023-10-23 PROCEDURE — 36415 COLL VENOUS BLD VENIPUNCTURE: CPT

## 2023-10-23 PROCEDURE — 82043 UR ALBUMIN QUANTITATIVE: CPT

## 2023-11-01 ENCOUNTER — TRANSCRIBE ORDERS (OUTPATIENT)
Dept: LAB | Facility: HOSPITAL | Age: 63
End: 2023-11-01
Payer: MEDICARE

## 2023-11-01 ENCOUNTER — LAB (OUTPATIENT)
Dept: LAB | Facility: HOSPITAL | Age: 63
End: 2023-11-01
Payer: MEDICARE

## 2023-11-01 DIAGNOSIS — E78.49 OTHER HYPERLIPIDEMIA: Primary | ICD-10-CM

## 2023-11-01 LAB
CHOLEST SERPL-MCNC: 260 MG/DL (ref 0–200)
HDLC SERPL-MCNC: 31 MG/DL (ref 40–60)
LDLC SERPL CALC-MCNC: 180 MG/DL (ref 0–100)
LDLC/HDLC SERPL: 5.74 {RATIO}
TRIGL SERPL-MCNC: 255 MG/DL (ref 0–150)
VLDLC SERPL-MCNC: 49 MG/DL (ref 5–40)

## 2023-11-01 PROCEDURE — 80061 LIPID PANEL: CPT | Performed by: INTERNAL MEDICINE

## 2023-11-01 PROCEDURE — 36415 COLL VENOUS BLD VENIPUNCTURE: CPT | Performed by: INTERNAL MEDICINE

## 2023-11-14 ENCOUNTER — LAB (OUTPATIENT)
Dept: LAB | Facility: HOSPITAL | Age: 63
End: 2023-11-14
Payer: MEDICARE

## 2023-11-14 DIAGNOSIS — E78.49 OTHER HYPERLIPIDEMIA: ICD-10-CM

## 2023-11-14 LAB
CHOLEST SERPL-MCNC: 219 MG/DL (ref 0–200)
HDLC SERPL-MCNC: 29 MG/DL (ref 40–60)
LDLC SERPL CALC-MCNC: 147 MG/DL (ref 0–100)
LDLC/HDLC SERPL: 4.94 {RATIO}
TRIGL SERPL-MCNC: 233 MG/DL (ref 0–150)
VLDLC SERPL-MCNC: 43 MG/DL (ref 5–40)

## 2023-11-14 PROCEDURE — 80061 LIPID PANEL: CPT

## 2023-11-14 PROCEDURE — 36415 COLL VENOUS BLD VENIPUNCTURE: CPT

## 2023-11-15 ENCOUNTER — TELEPHONE (OUTPATIENT)
Dept: GENETICS | Facility: HOSPITAL | Age: 63
End: 2023-11-15
Payer: MEDICARE

## 2023-11-16 ENCOUNTER — HOSPITAL ENCOUNTER (EMERGENCY)
Facility: HOSPITAL | Age: 63
Discharge: HOME | End: 2023-11-16
Attending: EMERGENCY MEDICINE
Payer: MEDICARE

## 2023-11-16 ENCOUNTER — APPOINTMENT (OUTPATIENT)
Dept: GENERAL RADIOLOGY | Facility: HOSPITAL | Age: 63
End: 2023-11-16
Payer: MEDICARE

## 2023-11-16 VITALS
HEIGHT: 68 IN | DIASTOLIC BLOOD PRESSURE: 69 MMHG | RESPIRATION RATE: 18 BRPM | BODY MASS INDEX: 41.1 KG/M2 | WEIGHT: 271.17 LBS | HEART RATE: 54 BPM | OXYGEN SATURATION: 97 % | SYSTOLIC BLOOD PRESSURE: 138 MMHG | TEMPERATURE: 98.7 F

## 2023-11-16 DIAGNOSIS — S52.131A CLOSED DISPLACED FRACTURE OF NECK OF RIGHT RADIUS, INITIAL ENCOUNTER: Primary | ICD-10-CM

## 2023-11-16 PROCEDURE — 73110 X-RAY EXAM OF WRIST: CPT

## 2023-11-16 PROCEDURE — 71101 X-RAY EXAM UNILAT RIBS/CHEST: CPT

## 2023-11-16 PROCEDURE — 73080 X-RAY EXAM OF ELBOW: CPT

## 2023-11-16 PROCEDURE — 99283 EMERGENCY DEPT VISIT LOW MDM: CPT

## 2023-11-16 PROCEDURE — 73090 X-RAY EXAM OF FOREARM: CPT

## 2023-11-16 RX ORDER — EZETIMIBE 10 MG/1
TABLET ORAL
COMMUNITY
Start: 2023-11-02

## 2023-11-16 RX ORDER — ACETAMINOPHEN 325 MG/1
975 TABLET ORAL ONCE
Status: COMPLETED | OUTPATIENT
Start: 2023-11-16 | End: 2023-11-16

## 2023-11-16 RX ORDER — PRAVASTATIN SODIUM 10 MG
TABLET ORAL
COMMUNITY
Start: 2023-11-01

## 2023-11-16 RX ADMIN — ACETAMINOPHEN 975 MG: 325 TABLET ORAL at 18:01

## 2023-11-16 NOTE — ED PROVIDER NOTES
Time: 3:54 PM EST  Date of encounter:  2023  Independent Historian/Clinical History and Information was obtained by:   Patient    History is limited by: N/A    Chief Complaint   Patient presents with    Fall         History of Present Illness:  Patient is a 62 y.o. year old female who presents to the emergency department for evaluation of tripped and fell on concrete, tried to catch herself with her right arm.  Having complaints of right arm and right rib pain.  Denies hitting her head or LOC.  Denies any shortness of breath, or chest pain.  Denies any dizziness or weakness prior to the fall.    Patient Care Team  Primary Care Provider: Rashid Acosta DO    Past Medical History:     Allergies   Allergen Reactions    Tetracycline Hcl Unknown - High Severity     INFECTED BLOOD    Valacyclovir Hcl Hives    Latex Rash     Rash     Azithromycin Hives    Cefaclor Unknown - Low Severity    Gluten Meal Other (See Comments)    Hydrocodone-Acetaminophen Unknown - Low Severity    Indomethacin Unknown - Low Severity    Ketorolac Tromethamine Unknown - Low Severity    Trazodone Dizziness     Past Medical History:   Diagnosis Date    Anemia     Arnold-Chiari deformity     Diabetes mellitus     Dr. Cameron manages    Obesity     Smoker     Stroke 2018    no deficits, baby ASA only, hx of blood thinners 2018    Uterine cancer     Visual impairment      Past Surgical History:   Procedure Laterality Date    ANUS SURGERY      blood clot    BRAIN SURGERY      shunt x3, and decompression    CARPAL TUNNEL RELEASE Left      SECTION      FOOT SURGERY Right     THYROID SURGERY       Family History   Problem Relation Age of Onset    Stroke Father     Breast cancer Mother     Heart disease Mother     Cancer Mother     Uterine cancer Neg Hx     Colon cancer Neg Hx     Ovarian cancer Neg Hx        Home Medications:  Prior to Admission medications    Medication Sig Start Date End Date Taking? Authorizing Provider   Aspirin Low Dose  81 MG EC tablet TAKE ONE TABLET BY MOUTH ONCE DAILY  Patient taking differently: Take 1 tablet by mouth Daily. 6/15/22   Keerthi Herrera APRN   clobetasol (TEMOVATE) 0.05 % ointment Apply 1 application  topically to the appropriate area as directed 2 (Two) Times a Day. 9/18/23   Rashid Acosta DO   diphenhydrAMINE (BENADRYL) 50 MG capsule Take 1 capsule by mouth At Night As Needed for Sleep.    Provider, MD Chas   EPINEPHrine (EPIPEN) 0.3 MG/0.3ML solution auto-injector injection Inject 0.3 mg into the appropriate muscle as directed by prescriber 1 (One) Time. 12/12/22   Provider, MD Chas   escitalopram (LEXAPRO) 20 MG tablet Take 1 tablet by mouth Daily. 5/11/23   Rashid Acosta DO   ferrous sulfate 325 (65 FE) MG tablet Take 1 tablet by mouth Daily With Breakfast. 6/5/23   Rashid Acosta DO   fluticasone (Flonase) 50 MCG/ACT nasal spray 2 sprays into the nostril(s) as directed by provider Daily. 5/11/23   Rashid Acosta DO   metFORMIN ER (GLUCOPHAGE-XR) 500 MG 24 hr tablet Take 2 tablets by mouth 2 (Two) Times a Day.  Patient taking differently: Take 3 tablets by mouth Daily. 5/11/23   Rashid Acosta DO   metoprolol succinate XL (TOPROL-XL) 50 MG 24 hr tablet Take 1 tablet by mouth Daily. 5/11/23   Rashid Acosta DO   pantoprazole (PROTONIX) 40 MG EC tablet Take 1 tablet by mouth Daily. 5/11/23   Rashid Acosta DO   Synthroid 200 MCG tablet Take 1 tablet by mouth Daily. 5/11/23   Rashid Acosta DO   Tirzepatide (Mounjaro) 7.5 MG/0.5ML solution pen-injector Inject 0.5 mL under the skin into the appropriate area as directed 1 (One) Time Per Week. 5/11/23   Rashid Acosta DO   vitamin D (ERGOCALCIFEROL) 1.25 MG (62567 UT) capsule capsule Take 1 capsule by mouth 1 (One) Time Per Week. 5/11/23   Rashid Acosta DO        Social History:   Social History     Tobacco Use    Smoking status: Every Day     Packs/day: 1.00     Years: 41.00     Additional pack years: 0.00     Total pack years: 41.00      "Types: Cigarettes     Start date: 1980    Smokeless tobacco: Never   Vaping Use    Vaping Use: Former    Substances: Nicotine, Flavoring   Substance Use Topics    Alcohol use: Never    Drug use: Never         Review of Systems:  Review of Systems   Constitutional:  Negative for chills, fatigue and fever.   HENT:  Negative for ear pain, rhinorrhea and sore throat.    Eyes:  Negative for visual disturbance.   Respiratory:  Negative for cough and shortness of breath.    Cardiovascular:  Negative for chest pain.   Gastrointestinal:  Negative for abdominal pain, diarrhea and vomiting.   Genitourinary:  Negative for difficulty urinating.   Musculoskeletal:  Positive for arthralgias. Negative for back pain and myalgias.        Right rib   Skin:  Negative for rash.   Neurological:  Negative for light-headedness and headaches.   Hematological:  Negative for adenopathy.   Psychiatric/Behavioral: Negative.          Physical Exam:  /69 (BP Location: Right arm, Patient Position: Sitting)   Pulse 54   Temp 98.7 °F (37.1 °C) (Oral)   Resp 18   Ht 172.7 cm (68\")   Wt 123 kg (271 lb 2.7 oz)   SpO2 97%   BMI 41.23 kg/m²         Physical Exam  Vitals and nursing note reviewed.   Constitutional:       General: She is not in acute distress.     Appearance: Normal appearance. She is not toxic-appearing.   HENT:      Head: Normocephalic and atraumatic.      Nose: Nose normal.      Mouth/Throat:      Mouth: Mucous membranes are moist.   Eyes:      Conjunctiva/sclera: Conjunctivae normal.      Pupils: Pupils are equal, round, and reactive to light.   Cardiovascular:      Rate and Rhythm: Normal rate.      Pulses: Normal pulses.   Pulmonary:      Effort: Pulmonary effort is normal.   Abdominal:      General: There is no distension.      Palpations: Abdomen is soft.      Tenderness: There is no abdominal tenderness.   Musculoskeletal:         General: Swelling and tenderness (right upper extremity) present. No deformity. Normal " range of motion.      Cervical back: Normal range of motion and neck supple.   Skin:     General: Skin is warm and dry.   Neurological:      General: No focal deficit present.      Mental Status: She is alert and oriented to person, place, and time.   Psychiatric:         Mood and Affect: Mood normal.         Behavior: Behavior normal.         Thought Content: Thought content normal.         Judgment: Judgment normal.                      Procedures:  Procedures      Medical Decision Making:      Comorbidities that affect care:    Diabetes, Obesity    External Notes reviewed:    None      The following orders were placed and all results were independently analyzed by me:  Orders Placed This Encounter   Procedures    XR Forearm 2 View Right    XR Wrist 3+ View Right    XR Ribs Right With PA Chest    XR Elbow 3+ View Right    Obtain & Apply The Following- Upper extremity; Sling    Obtain & Apply The Following- (posterior long arm orthoglass)       Medications Given in the Emergency Department:  Medications   acetaminophen (TYLENOL) tablet 975 mg (975 mg Oral Given 11/16/23 1801)        ED Course:    The patient was initially evaluated in the triage area where orders were placed. The patient was later dispositioned by RM Rose.      The patient was advised to stay for completion of workup which includes but is not limited to communication of labs and radiological results, reassessment and plan. The patient was advised that leaving prior to disposition by a provider could result in critical findings that are not communicated to the patient.     ED Course as of 11/17/23 0020   Thu Nov 16, 2023   1555 --- PROVIDER IN TRIAGE NOTE ---    The patient was evaluated by Khai gross in triage. Orders were placed and the patient is currently awaiting disposition.    [AJ]      ED Course User Index  [AJ] Khai Syed PA-C       Labs:    Lab Results (last 24 hours)       ** No results found for the last  24 hours. **             Imaging:    XR Elbow 3+ View Right    Result Date: 11/16/2023  PROCEDURE: XR ELBOW 3+ VW RIGHT  COMPARISON: None  INDICATIONS: RIGHT ELBOW PAIN AFTER FALL TODAY  FINDINGS:  There is a minimally displaced radial neck fracture with an elbow joint effusion.  The elbow joint is congruent.       Minimally displaced radial neck fracture.      ONEIL GONZALEZ MD       Electronically Signed and Approved By: ONEIL GONZALEZ MD on 11/16/2023 at 18:46             XR Forearm 2 View Right    Result Date: 11/16/2023  PROCEDURE: XR FOREARM 2 VW RIGHT  COMPARISON: None  INDICATIONS: Fall today, right forearm pain close to elbow  FINDINGS:  No fracture or malalignment is identified.  Distal forearm soft tissue swelling is noted.  An elbow joint effusion is suspected.        1. Suspected elbow joint effusion.  Dedicated views of the elbow are suggested to further evaluate. 2. Forearm soft tissue swelling      Cam London M.D.       Electronically Signed and Approved By: Cam London M.D. on 11/16/2023 at 16:59             XR Ribs Right With PA Chest    Result Date: 11/16/2023  PROCEDURE: XR RIBS RIGHT W PA CHEST  COMPARISON: [facility], , XR CHEST 1 VW, 5/20/2023, 7:06.  INDICATIONS: Right rib pain, fall today. Pain with inspiration and cough  FINDINGS:  The lungs are clear bilaterally.  The cardiac and mediastinal silhouettes appear normal.  No effusion is seen.  A right-sided ventriculoperitoneal shunt projects over the thorax near midline.  No fracture or pneumothorax is identified.          1. No acute cardiopulmonary disease 2. No fracture or pneumothorax     Cam London M.D.       Electronically Signed and Approved By: Cam London M.D. on 11/16/2023 at 16:51             XR Wrist 3+ View Right    Result Date: 11/16/2023  PROCEDURE: XR WRIST 3+ VW RIGHT  COMPARISON: None  INDICATIONS: Right wrist pain, fall today  FINDINGS:  There is no evidence of acute fracture or dislocation.   Carpal bones and radiocarpal joint are within normal limits.  There is soft tissue swelling along the dorsum of the distal forearm.        1. No acute bony abnormality      SHARATH ABAD MD       Electronically Signed and Approved By: SHARATH ABAD MD on 11/16/2023 at 16:49                Differential Diagnosis and Discussion:      Extremity Pain: Differential diagnosis includes but is not limited to soft tissue sprain, tendonitis, tendon injury, dislocation, fracture, deep vein thrombosis, arterial insufficiency, osteoarthritis, bursitis, and ligamentous damage.    All X-rays impressions were independently interpreted by me.    MDM  Number of Diagnoses or Management Options  Closed displaced fracture of neck of right radius, initial encounter  Diagnosis management comments: I have explained the patient´s condition, diagnoses and treatment plan based on the information available to me at this time. I have answered questions and addressed any concerns. The patient has a good  understanding of the patient´s diagnosis, condition, and treatment plan as can be expected at this point. The vital signs have been stable. The patient´s condition is stable and appropriate for discharge from the emergency department.      The patient will pursue further outpatient evaluation with the primary care physician or other designated or consulting physician as outlined in the discharge instructions. They are agreeable to this plan of care and follow-up instructions have been explained in detail. The patient has received these instructions in written format and have expressed an understanding of the discharge instructions. The patient is aware that any significant change in condition or worsening of symptoms should prompt an immediate return to this or the closest emergency department or call to 911.        Amount and/or Complexity of Data Reviewed  Tests in the radiology section of CPT®: reviewed                 Patient Care  Considerations:    CT HEAD: I considered ordering a noncontrast CT of the head, however patient denies hitting her head or any loss of consciousness.      Consultants/Shared Management Plan:    Consultant: I have discussed the case with Dr. Hernandez who states he will see patient in the clinic.    Social Determinants of Health:    Patient is independent, reliable, and has access to care.       Disposition and Care Coordination:    Discharged: The patient is suitable and stable for discharge with no need for consideration of observation or admission.    I have explained the patient´s condition, diagnoses and treatment plan based on the information available to me at this time. I have answered questions and addressed any concerns. The patient has a good  understanding of the patient´s diagnosis, condition, and treatment plan as can be expected at this point. The vital signs have been stable. The patient´s condition is stable and appropriate for discharge from the emergency department.      The patient will pursue further outpatient evaluation with the primary care physician or other designated or consulting physician as outlined in the discharge instructions. They are agreeable to this plan of care and follow-up instructions have been explained in detail. The patient has received these instructions in written format and have expressed an understanding of the discharge instructions. The patient is aware that any significant change in condition or worsening of symptoms should prompt an immediate return to this or the closest emergency department or call to 911.  I have explained discharge medications and the need for follow up with the patient/caretakers. This was also printed in the discharge instructions. Patient was discharged with the following medications and follow up:      Medication List        Changed      Aspirin Low Dose 81 MG EC tablet  Generic drug: aspirin  TAKE ONE TABLET BY MOUTH ONCE DAILY  What changed:  how much to take     metFORMIN  MG 24 hr tablet  Commonly known as: GLUCOPHAGE-XR  Take 2 tablets by mouth 2 (Two) Times a Day.  What changed:   how much to take  when to take this           Rashid Acosta,   [address]  [facility] [address] [phone]    Go to   As needed    Donna Hernandez MD  [address]   [place] [address] [phone]    Schedule an appointment as soon as possible for a visit          Final diagnoses:   Closed displaced fracture of neck of right radius, initial encounter        ED Disposition       ED Disposition   Discharge    Condition   Stable    Comment   --               This medical record created using voice recognition software.             Lili Gardner, APRN  11/17/23 0020

## 2023-11-17 NOTE — DISCHARGE INSTRUCTIONS
Please call the number provided to you on your discharge packet to schedule an appointment with the orthopedist as soon as possible.  Return to the ED for increasing pain, swelling of the right hand, coolness of the extremity.  Continue to take Tylenol for pain as needed.

## 2023-11-20 ENCOUNTER — CLINICAL SUPPORT (OUTPATIENT)
Dept: GENETICS | Facility: HOSPITAL | Age: 63
End: 2023-11-20
Payer: MEDICARE

## 2023-11-20 DIAGNOSIS — Z80.0 FAMILY HISTORY OF COLON CANCER: ICD-10-CM

## 2023-11-20 DIAGNOSIS — Z85.42 HISTORY OF UTERINE CANCER: ICD-10-CM

## 2023-11-20 DIAGNOSIS — Z80.49 FAMILY HISTORY OF UTERINE CANCER: ICD-10-CM

## 2023-11-20 DIAGNOSIS — Z80.3 FAMILY HISTORY OF MALIGNANT NEOPLASM OF BREAST: ICD-10-CM

## 2023-11-20 DIAGNOSIS — Z13.79 GENETIC TESTING: Primary | ICD-10-CM

## 2023-11-21 ENCOUNTER — OFFICE VISIT (OUTPATIENT)
Dept: ORTHOPEDIC SURGERY | Facility: CLINIC | Age: 63
End: 2023-11-21
Payer: MEDICARE

## 2023-11-21 VITALS — HEIGHT: 68 IN | BODY MASS INDEX: 41.07 KG/M2 | WEIGHT: 271 LBS

## 2023-11-21 DIAGNOSIS — S52.131A CLOSED DISPLACED FRACTURE OF NECK OF RIGHT RADIUS, INITIAL ENCOUNTER: Primary | ICD-10-CM

## 2023-11-21 RX ORDER — EMPAGLIFLOZIN 10 MG/1
TABLET, FILM COATED ORAL
COMMUNITY
Start: 2023-10-17

## 2023-11-21 NOTE — PROGRESS NOTES
"Chief Complaint  Initial Evaluation of the Right Arm     Subjective      Ifrah Robertson presents to [facility] for initial evaluation of the right arm.  She had a fall checking her mail.  She went to ED and had X rays and is here today for treatment. She is in a long arm splint.      Allergies   Allergen Reactions    Tetracycline Hcl Unknown - High Severity     INFECTED BLOOD    Valacyclovir Hcl Hives    Latex Rash     Rash     Azithromycin Hives    Cefaclor Unknown - Low Severity    Gluten Meal Other (See Comments)    Hydrocodone-Acetaminophen Unknown - Low Severity    Indomethacin Unknown - Low Severity    Ketorolac Tromethamine Unknown - Low Severity    Trazodone Dizziness        Social History     Socioeconomic History    Marital status:    Tobacco Use    Smoking status: Every Day     Packs/day: 1.00     Years: 41.00     Additional pack years: 0.00     Total pack years: 41.00     Types: Cigarettes     Start date: 1980    Smokeless tobacco: Never   Vaping Use    Vaping Use: Former    Substances: Nicotine, Flavoring   Substance and Sexual Activity    Alcohol use: Never    Drug use: Never    Sexual activity: Defer        I reviewed the patient's chief complaint, history of present illness, review of systems, past medical history, surgical history, family history, social history, medications, and allergy list.     Review of Systems     Constitutional: Denies fevers, chills, weight loss  Cardiovascular: Denies chest pain, shortness of breath  Skin: Denies rashes, acute skin changes  Neurologic: Denies headache, loss of consciousness      Vital Signs:   Ht 172.7 cm (68\")   Wt 123 kg (271 lb)   BMI 41.21 kg/m²          Physical Exam  General: Alert. No acute distress    Ortho Exam        RIGHT ARM Mildly Full , thumb opposition, MCP flexors, DIP flexors and PIP flexors.  Sensation to light touch median, radial, ulnar nerve. Positive AIN, PIN, ulnar nerve motor function. " Axillary sensation intact.     Orthopedic Injury Treatment    Date/Time: 11/21/2023 4:11 PM    Performed by: Donna Hernandez MD  Authorized by: Donna Hernandez MD    Anesthesia:  Local anesthesia used: no    Sedation:  Patient sedated: no    Immobilization: splint  Post-procedure neurovascular assessment: post-procedure neurovascularly intact  Patient tolerance: patient tolerated the procedure well with no immediate complications        Imaging Results (Most Recent)       None             Result Review :         XR Elbow 3+ View Right    Result Date: 11/16/2023  Narrative: PROCEDURE: XR ELBOW 3+ VW RIGHT  COMPARISON: None  INDICATIONS: RIGHT ELBOW PAIN AFTER FALL TODAY  FINDINGS:  There is a minimally displaced radial neck fracture with an elbow joint effusion.  The elbow joint is congruent.      Impression:  Minimally displaced radial neck fracture.      ONEIL GONZALEZ MD       Electronically Signed and Approved By: ONEIL GONZALEZ MD on 11/16/2023 at 18:46             XR Forearm 2 View Right    Result Date: 11/16/2023  Narrative: PROCEDURE: XR FOREARM 2 VW RIGHT  COMPARISON: None  INDICATIONS: Fall today, right forearm pain close to elbow  FINDINGS:  No fracture or malalignment is identified.  Distal forearm soft tissue swelling is noted.  An elbow joint effusion is suspected.      Impression:   1. Suspected elbow joint effusion.  Dedicated views of the elbow are suggested to further evaluate. 2. Forearm soft tissue swelling      Cam London M.D.       Electronically Signed and Approved By: Cam London M.D. on 11/16/2023 at 16:59             XR Ribs Right With PA Chest    Result Date: 11/16/2023  Narrative: PROCEDURE: XR RIBS RIGHT W PA CHEST  COMPARISON: [facility], , XR CHEST 1 VW, 5/20/2023, 7:06.  INDICATIONS: Right rib pain, fall today. Pain with inspiration and cough  FINDINGS:  The lungs are clear bilaterally.  The cardiac and mediastinal silhouettes appear normal.  No effusion is  seen.  A right-sided ventriculoperitoneal shunt projects over the thorax near midline.  No fracture or pneumothorax is identified.        Impression:   1. No acute cardiopulmonary disease 2. No fracture or pneumothorax     Cam London M.D.       Electronically Signed and Approved By: Cam London M.D. on 11/16/2023 at 16:51             XR Wrist 3+ View Right    Result Date: 11/16/2023  Narrative: PROCEDURE: XR WRIST 3+ VW RIGHT  COMPARISON: None  INDICATIONS: Right wrist pain, fall today  FINDINGS:  There is no evidence of acute fracture or dislocation.  Carpal bones and radiocarpal joint are within normal limits.  There is soft tissue swelling along the dorsum of the distal forearm.      Impression:   1. No acute bony abnormality      SHARATH ABAD MD       Electronically Signed and Approved By: SHARATH ABAD MD on 11/16/2023 at 16:49                     Assessment and Plan     Diagnoses and all orders for this visit:    1. Closed mildly displaced fracture of neck of right radius, initial encounter (Primary)      Discussed the treatment plan with the patient.     Long arm splint removed.      Splint applied.  Come out with gentle ROM.  Out and about wear splint.     Will obtain X-Rays of right elbow at next visit.    Educated on risk of smoking/nicotine. Discussed options for smoking cessation. and Call or return if worsening symptoms.    Follow Up     2-3 weeks with X ray of the right elbow.       Patient was given instructions and counseling regarding her condition or for health maintenance advice. Please see specific information pulled into the AVS if appropriate.     Scribed for Donna Hernandez MD by Isabell Moore MA.  11/21/23   15:40 EST    I have personally performed the services described in this document as scribed by the above individual and it is both accurate and complete. Donna Hernandez MD 11/22/23

## 2023-11-21 NOTE — PROGRESS NOTES
Genetic counseling was provided via telehealth.  Patient's name and date of birth was confirmed and confirmed that patient was physically located in [place] at the time of the appointment.    Ifrah Robertson, a 62 y.o. female, was referred for genetic counseling due to a family history of breast cancer. She reports a personal history of uterine cancer that was diagnosed last year at age 61 and she had a total hysterectomy with BSO (copy of records not available for review). Ms. Robertson was 13 years old at menarche and had her first child at 18. Ms. Robertson reports she was post-menopausal around age 50 and never took HRT. Her most recent colonoscopy was several years ago and she does not report a history of polyps. She was interested in discussing her risk for a hereditary cancer syndrome. Ms. Robertson was interested in pursuing a multigene panel, and therefore the Common Hereditary Cancers panel was ordered through [employer] which analyzes BRCA1/2 and 46 additional genes associated with an increased cancer risk.     FAMILY HISTORY:  Mother:   Breast cancer, 40s  Mat. Aunt:   Breast cancer, late 60s  Mat. 1st cousin:  Uterine cancer, late 50s/early 60s  Pat. Aunt:   Breast cancer, 50s  Pat. Grandfather:  Colon cancer, age at diagnosis unknown  Pat. 1st cousin:  Breast cancer, late 40s  Pat. 1st cousin:  Breast cancer, late 20s/early 30s  Pat. 1st cousin:  Breast cancer, 30s    We do not have medical records confirming the diagnoses in Ms. Robertson's family.    RISK ASSESSMENT: Ms. Robertson's family history of breast cancer led to concern regarding a hereditary cancer syndrome.  She meets NCCN guidelines criteria for BRCA1/2 testing based on her family history of breast cancer in a first-degree relative diagnosed before age 50, as well as her paternal family history of breast cancer in more than three close relatives. The standard approach to genetic testing is through a multigene panel.     GENETIC COUNSELING (30  minutes):  We reviewed the family history information in detail.  Cases of cancer follow three general patterns: sporadic, familial, and hereditary.  While most cancer is sporadic, some cases appear to occur in family clusters.  These cases are said to be familial and account for 10-20% of certain cancer cases.  Familial cases may be due to a combination of shared genes and environmental factors among family members.  In even fewer families (~10%), the cancer is said to be inherited, and the genes responsible for the cancer are known.      Family histories typical of hereditary cancer syndromes usually include multiple first- and second-degree relatives diagnosed with cancer types that define a syndrome.  These cases tend to be diagnosed at younger-than-expected ages and can be bilateral or multifocal.  The cancer in these families follows an autosomal dominant inheritance pattern, which indicates the likely presence of a mutation in a cancer susceptibility gene.  Children and siblings of an individual believed to carry this mutation have a 50% chance of inheriting that mutation, thereby inheriting the increased risk to develop cancer.  These mutations can be passed down from the maternal or the paternal lineage.    We discussed that hereditary breast cancer accounts for approximately 5-10% of all cases of breast cancer.  A significant proportion of hereditary breast and ovarian cancer can be attributed to mutations in the BRCA1 and BRCA2 genes.  Mutations in these genes confer an increased risk for breast cancer, ovarian cancer, male breast cancer, prostate cancer, and pancreatic cancer. Women with a BRCA1 or BRCA2 mutation have up to an 87% lifetime risk of breast cancer and up to a 44% risk of ovarian cancer. Given her personal history of uterine cancer and family history of colon cancer, we reviewed the possibility of Hammond syndrome.     The standard approach to genetic testing is via a multigene panel.  Genes  included on these panels have varying degrees of risk associated, and management and screening guidelines vary based on the specific gene.  Hereditary cancer syndromes can demonstrate incomplete penetrance and variable expression within families. There are genes that are evaluated that have been more recently described, and there may be less data regarding the risks and therefore may not have established management guidelines at this time. We reviewed that in some cases, the identification of a genetic mutation may impact treatment options for some types of cancer. We discussed the possibility of results that are unexpected based on family history. We discussed these limitations at length. Based on Ms. Robertson's personal history and her desire to get more information regarding her personal risks and risks for her family, she opted to pursue testing through a panel evaluating several other genes known to increase the risk for cancer.     GENETIC TESTING:  The risks, benefits and limitations of genetic testing and implications for clinical management following testing were reviewed. DNA test results can influence decisions regarding screening and prevention.  Genetic testing can have significant psychological implications for both individuals and families. Also discussed was the possibility of employment and insurance discrimination based on genetic test results and the federal and states laws that are in place to prevent this (JIGAR), as well as the limitations of these laws.         We discussed multigene panel testing, which would involve testing several genes associated with an increased cancer risk. The benefits and limitations of genetic testing were discussed and Ms. Robertson decided to pursue testing of the genes via the panel. The implications of a positive or negative test result were discussed.  We discussed the possibility that, in some cases, genetic test results may be ambiguous due to the identification  of a genetic variant of uncertain significance (VUS). These variants may or may not be associated with an increased cancer risk. With multigene panel testing, it is not uncommon for a VUS to be identified.  If a VUS is identified, testing family members is not recommended and screening recommendations are made based on the family history.  The laboratories that perform genetic testing work to reclassify the VUS and send out an amended report if and when a VUS is reclassified.  The majority of variant findings are ultimately reclassified to a negative result. Given her personal and family history, a negative test result does not eliminate all cancer risk to her relatives, although the risk would not be as high as it would with positive genetic testing.     PLAN:  Genetic testing via the Common Hereditary Cancers panel through [facility] was ordered. A saliva kit will be mailed to her home, and results are expected 2-3 weeks after the lab receives her sample. We will contact Ms. Robertson  with her results once they are received. She can contact us at [phone] with any questions in the meantime.       Sujey Sanchez MS, [facility] [place], [facility]  Licensed Certified Genetic Counselor

## 2023-11-22 ENCOUNTER — HOSPITAL ENCOUNTER (EMERGENCY)
Facility: HOSPITAL | Age: 63
Discharge: HOME | End: 2023-11-23
Attending: EMERGENCY MEDICINE
Payer: MEDICARE

## 2023-11-22 DIAGNOSIS — T78.40XA ALLERGIC REACTION, INITIAL ENCOUNTER: Primary | ICD-10-CM

## 2023-11-22 PROCEDURE — 25010000002 ONDANSETRON PER 1 MG: Performed by: EMERGENCY MEDICINE

## 2023-11-22 PROCEDURE — 96374 THER/PROPH/DIAG INJ IV PUSH: CPT

## 2023-11-22 PROCEDURE — 99283 EMERGENCY DEPT VISIT LOW MDM: CPT

## 2023-11-22 RX ORDER — ONDANSETRON 2 MG/ML
4 INJECTION INTRAMUSCULAR; INTRAVENOUS ONCE
Status: COMPLETED | OUTPATIENT
Start: 2023-11-22 | End: 2023-11-22

## 2023-11-22 RX ORDER — HYDROXYZINE HYDROCHLORIDE 25 MG/1
50 TABLET, FILM COATED ORAL ONCE
Status: COMPLETED | OUTPATIENT
Start: 2023-11-23 | End: 2023-11-23

## 2023-11-22 RX ADMIN — ONDANSETRON 4 MG: 2 INJECTION INTRAMUSCULAR; INTRAVENOUS at 23:35

## 2023-11-23 VITALS
HEART RATE: 107 BPM | BODY MASS INDEX: 42.2 KG/M2 | OXYGEN SATURATION: 91 % | TEMPERATURE: 99.7 F | HEIGHT: 68 IN | DIASTOLIC BLOOD PRESSURE: 67 MMHG | SYSTOLIC BLOOD PRESSURE: 113 MMHG | WEIGHT: 278.44 LBS | RESPIRATION RATE: 20 BRPM

## 2023-11-23 RX ADMIN — HYDROXYZINE HYDROCHLORIDE 50 MG: 25 TABLET, FILM COATED ORAL at 00:00

## 2023-11-23 NOTE — DISCHARGE INSTRUCTIONS
Continue using Benadryl or other over-the-counter antihistamine as needed for itching/rash.    May use over-the-counter anti-itch lotion such as Calamine lotion

## 2023-11-23 NOTE — ED TRIAGE NOTES
Pt arrived via ems for chief c/o Allergic reaction, unknown cause was eating vegetable soup and crackers, ems admin 125 mg solumedrol, 50 mg benadryl, 4 mg zofran and 0.3 mg epi. Vitals stable en route. No soa, no wheezing.

## 2023-11-23 NOTE — ED PROVIDER NOTES
Time: 11:29 PM EST  Date of encounter:  2023  Independent Historian/Clinical History and Information was obtained by:   Patient    History is limited by: N/A    Chief Complaint: Allergic reaction      History of Present Illness:  Patient is a 63 y.o. year old female who presents to the emergency department for evaluation of allergic reaction    HPI    Patient Care Team  Primary Care Provider: Rashid Acosta DO    Past Medical History:     Allergies   Allergen Reactions    Tetracycline Hcl Unknown - High Severity     INFECTED BLOOD    Valacyclovir Hcl Hives    Latex Rash     Rash     Azithromycin Hives    Cefaclor Unknown - Low Severity    Gluten Meal Other (See Comments)    Hydrocodone-Acetaminophen Unknown - Low Severity    Indomethacin Unknown - Low Severity    Ketorolac Tromethamine Unknown - Low Severity    Trazodone Dizziness     Past Medical History:   Diagnosis Date    Anemia     Arnold-Chiari deformity     Diabetes mellitus     Dr. Cameron manages    Obesity     Smoker     Stroke 2018    no deficits, baby ASA only, hx of blood thinners 2018    Uterine cancer     Visual impairment      Past Surgical History:   Procedure Laterality Date    ANUS SURGERY      blood clot    BRAIN SURGERY      shunt x3, and decompression    CARPAL TUNNEL RELEASE Left      SECTION      FOOT SURGERY Right     THYROID SURGERY       Family History   Problem Relation Age of Onset    Stroke Father     Breast cancer Mother     Heart disease Mother     Cancer Mother     Uterine cancer Neg Hx     Colon cancer Neg Hx     Ovarian cancer Neg Hx        Home Medications:  Prior to Admission medications    Medication Sig Start Date End Date Taking? Authorizing Provider   Aspirin Low Dose 81 MG EC tablet TAKE ONE TABLET BY MOUTH ONCE DAILY  Patient taking differently: Take 1 tablet by mouth Daily. 6/15/22   Keerthi Herrera APRN   clobetasol (TEMOVATE) 0.05 % ointment Apply 1 application  topically to the appropriate area as  directed 2 (Two) Times a Day. 9/18/23   Rashid Acosta DO   diphenhydrAMINE (BENADRYL) 50 MG capsule Take 1 capsule by mouth At Night As Needed for Sleep.    Chas Cohen MD   EPINEPHrine (EPIPEN) 0.3 MG/0.3ML solution auto-injector injection Inject 0.3 mg into the appropriate muscle as directed by prescriber 1 (One) Time. 12/12/22   Chas Cohen MD   escitalopram (LEXAPRO) 20 MG tablet Take 1 tablet by mouth Daily. 5/11/23   Rashid Acosta DO   ezetimibe (ZETIA) 10 MG tablet  11/2/23   Chas Cohen MD   ferrous sulfate 325 (65 FE) MG tablet Take 1 tablet by mouth Daily With Breakfast. 6/5/23   Rashid Acosta DO   fluticasone (Flonase) 50 MCG/ACT nasal spray 2 sprays into the nostril(s) as directed by provider Daily. 5/11/23   Rashid Acosta DO   Jardiance 10 MG tablet tablet  10/17/23   Chas Cohen MD   metFORMIN ER (GLUCOPHAGE-XR) 500 MG 24 hr tablet Take 2 tablets by mouth 2 (Two) Times a Day.  Patient taking differently: Take 3 tablets by mouth Daily. 5/11/23   Rashid Acosta DO   metoprolol succinate XL (TOPROL-XL) 50 MG 24 hr tablet Take 1 tablet by mouth Daily. 5/11/23   Rashid Acosta DO   pantoprazole (PROTONIX) 40 MG EC tablet Take 1 tablet by mouth Daily. 5/11/23   Rashid Aocsta DO   pravastatin (PRAVACHOL) 10 MG tablet  11/1/23   ProviderChas MD   Synthroid 200 MCG tablet Take 1 tablet by mouth Daily. 5/11/23   Rashid Acosta DO   Tirzepatide (Mounjaro) 7.5 MG/0.5ML solution pen-injector Inject 0.5 mL under the skin into the appropriate area as directed 1 (One) Time Per Week. 5/11/23   Rashid Acosta DO   vitamin D (ERGOCALCIFEROL) 1.25 MG (46010 UT) capsule capsule Take 1 capsule by mouth 1 (One) Time Per Week. 5/11/23   Rashid Acosta DO        Social History:   Social History     Tobacco Use    Smoking status: Every Day     Packs/day: 1.00     Years: 41.00     Additional pack years: 0.00     Total pack years: 41.00     Types: Cigarettes     Start date:  "1980    Smokeless tobacco: Never   Vaping Use    Vaping Use: Former    Substances: Nicotine, Flavoring   Substance Use Topics    Alcohol use: Never    Drug use: Never         Review of Systems:  Review of Systems   Skin:  Positive for rash.        Physical Exam:  /67   Pulse 107   Temp 99.7 °F (37.6 °C) (Oral)   Resp 20   Ht 172.7 cm (68\")   Wt 126 kg (278 lb 7.1 oz)   SpO2 91%   BMI 42.34 kg/m²     Physical Exam  Vitals and nursing note reviewed.   Constitutional:       General: She is not in acute distress.  HENT:      Head: Normocephalic.      Nose: Nose normal.      Mouth/Throat:      Mouth: Mucous membranes are moist.      Pharynx: Oropharynx is clear.   Eyes:      Extraocular Movements: Extraocular movements intact.   Cardiovascular:      Rate and Rhythm: Normal rate and regular rhythm.      Heart sounds: Normal heart sounds.   Pulmonary:      Effort: Pulmonary effort is normal. No respiratory distress.      Breath sounds: Normal breath sounds.   Abdominal:      Palpations: Abdomen is soft.      Tenderness: There is no abdominal tenderness.   Musculoskeletal:         General: Normal range of motion.      Cervical back: Normal range of motion and neck supple.   Skin:     General: Skin is warm and dry.      Findings: Rash present.      Comments: Generalized macular rash   Neurological:      Mental Status: She is alert and oriented to person, place, and time.   Psychiatric:         Mood and Affect: Mood normal.                  Procedures:  Procedures      Medical Decision Making:      Comorbidities that affect care:    Diabetes    External Notes reviewed:    Previous ED Note: Patient seen in this emergency department on 11/16/2023 after a fall.  Patient had tripped and sustained a fracture of the right radius      The following orders were placed and all results were independently analyzed by me:  No orders of the defined types were placed in this encounter.      Medications Given in the Emergency " Department:  Medications   ondansetron (ZOFRAN) injection 4 mg (4 mg Intravenous Given 11/22/23 2335)   hydrOXYzine (ATARAX) tablet 50 mg (50 mg Oral Given 11/23/23 0000)        ED Course:         Labs:    Lab Results (last 24 hours)       ** No results found for the last 24 hours. **             Imaging:    No Radiology Exams Resulted Within Past 24 Hours      Differential Diagnosis and Discussion:    Allergic Reaction: Differential diagnosis includes but is not limited to drug side effects, contact dermatitis, autoimmune conditions, infections, mast cell disorders, serum sickness, anaphylactoid reactions, angioedema, panic or anxiety attacks.        MDM  Patient had received an EpiPen injection along with steroids and Benadryl by EMS prior to arrival.  Patient was observed for several hours and was given Atarax in the emergency department and her symptoms were improving.  Patient had no airway compromise and her lungs are clear to auscultation.  Patient has EpiPen's at home and is stable for discharge.        Patient Care Considerations:          Consultants/Shared Management Plan:    None    Social Determinants of Health:    Patient is independent, reliable, and has access to care.       Disposition and Care Coordination:    Discharged: The patient is suitable and stable for discharge with no need for consideration of observation or admission.    I have explained the patient´s condition, diagnoses and treatment plan based on the information available to me at this time. I have answered questions and addressed any concerns. The patient has a good  understanding of the patient´s diagnosis, condition, and treatment plan as can be expected at this point. The vital signs have been stable. The patient´s condition is stable and appropriate for discharge from the emergency department.      The patient will pursue further outpatient evaluation with the primary care physician or other designated or consulting physician as  outlined in the discharge instructions. They are agreeable to this plan of care and follow-up instructions have been explained in detail. The patient has received these instructions in written format and have expressed an understanding of the discharge instructions. The patient is aware that any significant change in condition or worsening of symptoms should prompt an immediate return to this or the closest emergency department or call to 911.    Final diagnoses:   Allergic reaction, initial encounter        ED Disposition       ED Disposition   Discharge    Condition   Stable    Comment   --               This medical record created using voice recognition software.             Alexander Reddy DO  11/23/23 0219

## 2023-12-07 ENCOUNTER — OFFICE VISIT (OUTPATIENT)
Dept: ORTHOPEDIC SURGERY | Facility: CLINIC | Age: 63
End: 2023-12-07
Payer: MEDICARE

## 2023-12-07 VITALS — HEIGHT: 68 IN | BODY MASS INDEX: 42.13 KG/M2 | WEIGHT: 278 LBS

## 2023-12-07 DIAGNOSIS — S52.131D CLOSED DISPLACED FRACTURE OF NECK OF RIGHT RADIUS WITH ROUTINE HEALING, SUBSEQUENT ENCOUNTER: Primary | ICD-10-CM

## 2023-12-07 NOTE — PATIENT INSTRUCTIONS
X-rays taken and reviewed. Advised continued ROM efforts. Patient declines PT. no heavy lifting, pushing, or pulling.  Continue icing and elevation as needed for pain or swelling.    Follow up in 4 weeks. Repeat x-rays.

## 2023-12-07 NOTE — PROGRESS NOTES
"Chief Complaint  Pain and Follow-up of the Right Elbow    Subjective      Ifrah Robertson presents to [facility] for follow-up of right radial neck fracture sustained in injury on 11/16/2023.  She was evaluated in clinic on 11/21/2023, advised to wean out of splint and work on gentle ROM efforts.  She presents today for follow-up reporting continued improvement in pain and ROM.  Does state that she has \"some good days and some bad\".    Objective   Allergies   Allergen Reactions    Tetracycline Hcl Unknown - High Severity     INFECTED BLOOD    Valacyclovir Hcl Hives    Latex Rash     Rash     Azithromycin Hives    Cefaclor Unknown - Low Severity    Gluten Meal Other (See Comments)    Hydrocodone-Acetaminophen Unknown - Low Severity    Indomethacin Unknown - Low Severity    Ketorolac Tromethamine Unknown - Low Severity    Trazodone Dizziness       Vital Signs:   Ht 172.7 cm (68\")   Wt 126 kg (278 lb)   BMI 42.27 kg/m²       Physical Exam    Constitutional: Awake, alert. Well nourished appearance.    Integumentary: Warm, dry, intact. No obvious rashes.    HENT: Atraumatic, normocephalic.   Respiratory: Non labored respirations .   Cardiovascular: Intact peripheral pulses.    Psychiatric: Normal mood and affect. A&O X3    Ortho Exam  Right elbow: Skin is warm, dry, and intact.  Minimal tenderness to palpation.  No edema, ecchymosis, or obvious deformity.  -5 degrees of elbow extension.  Full elbow flexion.  Full pronation and supination with pain reported.  Patient is able to form a full fist.  Good thumb opposition.  Sensation and distal neurovascular intact.    Imaging Results (Most Recent)       Procedure Component Value Units Date/Time    XR Elbow 2 View Right [[id number]] Resulted: 12/07/23 1533     Updated: 12/07/23 1535    Narrative:      X-Ray Report:  Study: X-rays ordered, taken in the office, and reviewed today.   Site: Right elbow Xray  Indication: Fracture  View: AP/Lateral " view(s)  Findings: Well healing fracture of the right radial neck.   Prior studies available for comparison: yes                     Assessment and Plan   Problem List Items Addressed This Visit    None  Visit Diagnoses       Closed displaced fracture of neck of right radius with routine healing, subsequent encounter    -  Primary    Relevant Orders    XR Elbow 2 View Right (Completed)          Follow Up   Return in about 4 weeks (around 1/4/2024).    Tobacco Use: High Risk (12/7/2023)    Patient History     Smoking Tobacco Use: Every Day     Smokeless Tobacco Use: Never     Passive Exposure: Not on file     Educated on risk of smoking. Discussed options for smoking cessation.    Patient Instructions   X-rays taken and reviewed. Advised continued ROM efforts. Patient declines PT. no heavy lifting, pushing, or pulling.  Continue icing and elevation as needed for pain or swelling.    Follow up in 4 weeks. Repeat x-rays.   Patient was given instructions and counseling regarding her condition or for health maintenance advice. Please see specific information pulled into the AVS if appropriate.

## 2023-12-20 ENCOUNTER — DOCUMENTATION (OUTPATIENT)
Dept: GENETICS | Facility: HOSPITAL | Age: 63
End: 2023-12-20
Payer: MEDICARE

## 2023-12-20 NOTE — PROGRESS NOTES
Ifrah Robertson, a 62 y.o. female, was referred for genetic counseling due to a family history of breast cancer. She reports a personal history of uterine cancer that was diagnosed last year at age 61 and she had a total hysterectomy with BSO (copy of records not available for review). Ms. Robertson was 13 years old at menarche and had her first child at 18. Ms. Robertson reports she was post-menopausal around age 50 and never took HRT. Her most recent colonoscopy was several years ago and she does not report a history of polyps. She was interested in discussing her risk for a hereditary cancer syndrome. Ms. Robertson was interested in pursuing a multigene panel, and therefore the Common Hereditary Cancers panel was ordered through [employer] which analyzes BRCA1/2 and 46 additional genes associated with an increased cancer risk.  Genetic testing was negative for pathogenic mutations in BRCA1/2 and 46 additional genes included on the Common Hereditary Cancers panel. These results were discussed with Ms. Robertson by telephone on 12/20/2023.    FAMILY HISTORY:  Mother:                                    Breast cancer, 40s  Mat. Aunt:                                Breast cancer, late 60s  Mat. 1st cousin:                       Uterine cancer, late 50s/early 60s  Pat. Aunt:                                Breast cancer, 50s  Pat. Grandfather:                    Colon cancer, age at diagnosis unknown  Pat. 1st cousin:                       Breast cancer, late 40s  Pat. 1st cousin:                       Breast cancer, late 20s/early 30s  Pat. 1st cousin:                       Breast cancer, 30s     We do not have medical records confirming the diagnoses in Ms. Robertson's family.     RISK ASSESSMENT: Ms. Robertson's family history of breast cancer led to concern regarding a hereditary cancer syndrome.  She meets NCCN guidelines criteria for BRCA1/2 testing based on her family history of breast cancer in a first-degree relative  diagnosed before age 50, as well as her paternal family history of breast cancer in more than three close relatives. The standard approach to genetic testing is through a multigene panel.      Because genetic testing was negative, her management should be guided by a family history-based risk assessment. Tyrer-Cuzick, version 8 is able to take into account personal factors and family history when calculating risk for breast cancer. Computer modeling estimates that Ms. Robertson's lifetime personal risk for developing breast cancer is 22.5% (Tyrer-Cuzick, v8), compared to the general population risk of 12.5%. A risk of 20% or greater warrants consideration of increased screening for Ms. Robertson per NCCN guidelines.  This risk assessment is based on the family history information provided at the time of the appointment and could change in the future should new information be obtained.     GENETIC COUNSELING:  We reviewed the family history information in detail.  Cases of cancer follow three general patterns: sporadic, familial, and hereditary.  While most cancer is sporadic, some cases appear to occur in family clusters.  These cases are said to be familial and account for 10-20% of certain cancer cases.  Familial cases may be due to a combination of shared genes and environmental factors among family members.  In even fewer families (~10%), the cancer is said to be inherited, and the genes responsible for the cancer are known.       Family histories typical of hereditary cancer syndromes usually include multiple first- and second-degree relatives diagnosed with cancer types that define a syndrome.  These cases tend to be diagnosed at younger-than-expected ages and can be bilateral or multifocal.  The cancer in these families follows an autosomal dominant inheritance pattern, which indicates the likely presence of a mutation in a cancer susceptibility gene.  Children and siblings of an individual believed to carry this  mutation have a 50% chance of inheriting that mutation, thereby inheriting the increased risk to develop cancer.  These mutations can be passed down from the maternal or the paternal lineage.     We discussed that hereditary breast cancer accounts for approximately 5-10% of all cases of breast cancer.  A significant proportion of hereditary breast and ovarian cancer can be attributed to mutations in the BRCA1 and BRCA2 genes.  Mutations in these genes confer an increased risk for breast cancer, ovarian cancer, male breast cancer, prostate cancer, and pancreatic cancer. Women with a BRCA1 or BRCA2 mutation have up to an 87% lifetime risk of breast cancer and up to a 44% risk of ovarian cancer. Given her personal history of uterine cancer and family history of colon cancer, we reviewed the possibility of Hammond syndrome.      The standard approach to genetic testing is via a multigene panel.  Genes included on these panels have varying degrees of risk associated, and management and screening guidelines vary based on the specific gene.  Hereditary cancer syndromes can demonstrate incomplete penetrance and variable expression within families. There are genes that are evaluated that have been more recently described, and there may be less data regarding the risks and therefore may not have established management guidelines at this time. We reviewed that in some cases, the identification of a genetic mutation may impact treatment options for some types of cancer. We discussed the possibility of results that are unexpected based on family history. We discussed these limitations at length. Based on Ms. Robertson's personal history and her desire to get more information regarding her personal risks and risks for her family, she opted to pursue testing through a panel evaluating several other genes known to increase the risk for cancer.      GENETIC TESTING:  The risks, benefits and limitations of genetic testing and  implications for clinical management following testing were reviewed. DNA test results can influence decisions regarding screening and prevention.  Genetic testing can have significant psychological implications for both individuals and families. Also discussed was the possibility of employment and insurance discrimination based on genetic test results and the federal and states laws that are in place to prevent this (JIGAR), as well as the limitations of these laws.         We discussed multigene panel testing, which would involve testing several genes associated with an increased cancer risk. The benefits and limitations of genetic testing were discussed and Ms. Robertson decided to pursue testing of the genes via the panel. The implications of a positive or negative test result were discussed.  We discussed the possibility that, in some cases, genetic test results may be ambiguous due to the identification of a genetic variant of uncertain significance (VUS). These variants may or may not be associated with an increased cancer risk. With multigene panel testing, it is not uncommon for a VUS to be identified.  If a VUS is identified, testing family members is not recommended and screening recommendations are made based on the family history.  The laboratories that perform genetic testing work to reclassify the VUS and send out an amended report if and when a VUS is reclassified.  The majority of variant findings are ultimately reclassified to a negative result. Given her personal and family history, a negative test result does not eliminate all cancer risk to her relatives, although the risk would not be as high as it would with positive genetic testing.     TEST RESULTS:  Genetic testing was negative for pathogenic mutations by sequencing and rearrangement testing for the 48 genes on the Common Hereditary Cancers panel.  The negative result greatly lowers the risk of a hereditary cancer syndrome for Ms. Robertson.  It  is possible that the family history of breast cancer is due to a hereditary cancer syndrome which Ms. Robertson did not happen to inherit. This assessment is based on the information provided at the time of the consultation.    CANCER SCREENING: Ms. Robertson's screening and management should be determined by her providers and oncology team. Given her family history, Ms. Robertson is at an increased lifetime risk for breast cancer, which warrants increased surveillance. Increased surveillance, based on NCCN guidelines, would consist of semi-annual clinical breast exams and monthly self-breast exams starting by age 18 and annual mammography starting 10 years younger than the earliest diagnosis in the family, or by age 40, whichever is earliest. According to an American Cancer Society expert panel and NCCN guidelines, annual breast MRI should be offered to women whose lifetime risk of breast cancer is 20-25 percent or more, typically beginning at the same age as mammography.  These assessments and recommendations are based on the information provided at time of consultation.     PLAN:  Genetic counseling remains available to Ms. Robertson. She can contact us at [phone] with any questions.         Sujey Sanchez MS, [facility] [place]  Licensed Certified Genetic Counselor      Cc: Ifrah Acosta DO

## 2024-02-06 ENCOUNTER — LAB (OUTPATIENT)
Dept: LAB | Facility: HOSPITAL | Age: 64
End: 2024-02-06
Payer: MEDICARE

## 2024-02-06 ENCOUNTER — TRANSCRIBE ORDERS (OUTPATIENT)
Dept: LAB | Facility: HOSPITAL | Age: 64
End: 2024-02-06
Payer: MEDICARE

## 2024-02-06 DIAGNOSIS — I10 ESSENTIAL HYPERTENSION, MALIGNANT: ICD-10-CM

## 2024-02-06 DIAGNOSIS — E11.65 TYPE II DIABETES MELLITUS WITH HYPEROSMOLARITY, UNCONTROLLED: Primary | ICD-10-CM

## 2024-02-06 DIAGNOSIS — E11.00 TYPE II DIABETES MELLITUS WITH HYPEROSMOLARITY, UNCONTROLLED: Primary | ICD-10-CM

## 2024-02-06 DIAGNOSIS — E78.5 HYPERLIPIDEMIA, UNSPECIFIED HYPERLIPIDEMIA TYPE: ICD-10-CM

## 2024-02-06 DIAGNOSIS — E11.00 TYPE II DIABETES MELLITUS WITH HYPEROSMOLARITY, UNCONTROLLED: ICD-10-CM

## 2024-02-06 DIAGNOSIS — E11.65 TYPE II DIABETES MELLITUS WITH HYPEROSMOLARITY, UNCONTROLLED: ICD-10-CM

## 2024-02-06 LAB
ALBUMIN UR-MCNC: <1.2 MG/DL
ANION GAP SERPL CALCULATED.3IONS-SCNC: 11 MMOL/L (ref 5–15)
BUN SERPL-MCNC: 14 MG/DL (ref 8–23)
BUN/CREAT SERPL: 14.6 (ref 7–25)
CALCIUM SPEC-SCNC: 9.2 MG/DL (ref 8.6–10.5)
CHLORIDE SERPL-SCNC: 100 MMOL/L (ref 98–107)
CHOLEST SERPL-MCNC: 239 MG/DL (ref 0–200)
CO2 SERPL-SCNC: 27 MMOL/L (ref 22–29)
CREAT SERPL-MCNC: 0.96 MG/DL (ref 0.57–1)
EGFRCR SERPLBLD CKD-EPI 2021: 66.6 ML/MIN/1.73
GLUCOSE SERPL-MCNC: 168 MG/DL (ref 65–99)
HBA1C MFR BLD: 7.2 % (ref 4.8–5.6)
HDLC SERPL-MCNC: 36 MG/DL (ref 40–60)
LDLC SERPL CALC-MCNC: 160 MG/DL (ref 0–100)
LDLC/HDLC SERPL: 4.35 {RATIO}
POTASSIUM SERPL-SCNC: 5.1 MMOL/L (ref 3.5–5.2)
SODIUM SERPL-SCNC: 138 MMOL/L (ref 136–145)
TRIGL SERPL-MCNC: 232 MG/DL (ref 0–150)
VLDLC SERPL-MCNC: 43 MG/DL (ref 5–40)

## 2024-02-06 PROCEDURE — 80061 LIPID PANEL: CPT

## 2024-02-06 PROCEDURE — 83036 HEMOGLOBIN GLYCOSYLATED A1C: CPT

## 2024-02-06 PROCEDURE — 80048 BASIC METABOLIC PNL TOTAL CA: CPT

## 2024-02-06 PROCEDURE — 36415 COLL VENOUS BLD VENIPUNCTURE: CPT

## 2024-02-06 PROCEDURE — 82043 UR ALBUMIN QUANTITATIVE: CPT

## 2024-03-02 RX ORDER — IPRATROPIUM BROMIDE AND ALBUTEROL 20; 100 UG/1; UG/1
SPRAY, METERED RESPIRATORY (INHALATION)
Qty: 4 G | Refills: 11 | Status: SHIPPED | OUTPATIENT
Start: 2024-03-02

## 2024-05-08 ENCOUNTER — OFFICE VISIT (OUTPATIENT)
Dept: FAMILY MEDICINE CLINIC | Facility: CLINIC | Age: 64
End: 2024-05-08
Payer: MEDICARE

## 2024-05-08 VITALS
HEART RATE: 90 BPM | RESPIRATION RATE: 16 BRPM | DIASTOLIC BLOOD PRESSURE: 75 MMHG | OXYGEN SATURATION: 97 % | BODY MASS INDEX: 41.28 KG/M2 | TEMPERATURE: 97.5 F | HEIGHT: 68 IN | WEIGHT: 272.4 LBS | SYSTOLIC BLOOD PRESSURE: 135 MMHG

## 2024-05-08 DIAGNOSIS — Z87.891 PERSONAL HISTORY OF NICOTINE DEPENDENCE: ICD-10-CM

## 2024-05-08 DIAGNOSIS — Z12.11 COLON CANCER SCREENING: ICD-10-CM

## 2024-05-08 DIAGNOSIS — Z12.2 ENCOUNTER FOR SCREENING FOR LUNG CANCER: ICD-10-CM

## 2024-05-08 DIAGNOSIS — F51.01 PRIMARY INSOMNIA: Primary | ICD-10-CM

## 2024-05-08 PROCEDURE — 1126F AMNT PAIN NOTED NONE PRSNT: CPT | Performed by: FAMILY MEDICINE

## 2024-05-08 PROCEDURE — 99213 OFFICE O/P EST LOW 20 MIN: CPT | Performed by: FAMILY MEDICINE

## 2024-05-08 PROCEDURE — 3051F HG A1C>EQUAL 7.0%<8.0%: CPT | Performed by: FAMILY MEDICINE

## 2024-05-08 PROCEDURE — 1159F MED LIST DOCD IN RCRD: CPT | Performed by: FAMILY MEDICINE

## 2024-05-08 PROCEDURE — 1160F RVW MEDS BY RX/DR IN RCRD: CPT | Performed by: FAMILY MEDICINE

## 2024-05-08 RX ORDER — AZELASTINE HYDROCHLORIDE 137 UG/1
SPRAY, METERED NASAL
COMMUNITY
Start: 2024-02-07 | End: 2024-05-08

## 2024-05-08 RX ORDER — MONTELUKAST SODIUM 10 MG/1
TABLET ORAL
COMMUNITY
Start: 2024-04-03

## 2024-05-08 RX ORDER — MONTELUKAST SODIUM 10 MG/1
TABLET ORAL
COMMUNITY
Start: 2024-04-03 | End: 2024-05-08

## 2024-05-08 RX ORDER — TRAZODONE HYDROCHLORIDE 100 MG/1
100 TABLET ORAL NIGHTLY
Qty: 10 TABLET | Refills: 0 | Status: SHIPPED | OUTPATIENT
Start: 2024-05-08 | End: 2024-05-15 | Stop reason: SDUPTHER

## 2024-05-08 RX ORDER — SEMAGLUTIDE 2.68 MG/ML
INJECTION, SOLUTION SUBCUTANEOUS
COMMUNITY
Start: 2024-03-19

## 2024-05-08 RX ORDER — FAMOTIDINE 20 MG/1
TABLET, FILM COATED ORAL
COMMUNITY
Start: 2024-04-03

## 2024-05-08 RX ORDER — PIOGLITAZONEHYDROCHLORIDE 15 MG/1
TABLET ORAL
COMMUNITY
Start: 2024-02-23 | End: 2024-05-08

## 2024-05-10 ENCOUNTER — TELEPHONE (OUTPATIENT)
Dept: FAMILY MEDICINE CLINIC | Facility: CLINIC | Age: 64
End: 2024-05-10
Payer: MEDICARE

## 2024-05-10 ENCOUNTER — PATIENT ROUNDING (BHMG ONLY) (OUTPATIENT)
Dept: FAMILY MEDICINE CLINIC | Facility: CLINIC | Age: 64
End: 2024-05-10
Payer: MEDICARE

## 2024-05-10 NOTE — TELEPHONE ENCOUNTER
Caller: Ifrah Robertson    Relationship: Self    Best call back number: [phone]     What was the call regarding: PATIENT STATES THE TRAZODONE ISN'T HELPING HER SLEEP SO SHE WOULD LIKE TO KNOW IF SHE CAN TAKE 1.5 OR 2 TABLETS.

## 2024-05-13 NOTE — TELEPHONE ENCOUNTER
Informed Pt that 2 trazodone would be ok to take for help with sleep. No further questions at this time.

## 2024-05-15 ENCOUNTER — TELEPHONE (OUTPATIENT)
Dept: FAMILY MEDICINE CLINIC | Facility: CLINIC | Age: 64
End: 2024-05-15
Payer: MEDICARE

## 2024-05-15 DIAGNOSIS — F51.01 PRIMARY INSOMNIA: ICD-10-CM

## 2024-05-15 RX ORDER — TRAZODONE HYDROCHLORIDE 100 MG/1
200 TABLET ORAL NIGHTLY
Qty: 60 TABLET | Refills: 0 | Status: SHIPPED | OUTPATIENT
Start: 2024-05-15

## 2024-05-15 NOTE — TELEPHONE ENCOUNTER
Caller: Ifrah Robertson    Relationship: Self    Best call back number: [phone]     Requested Prescriptions:   Requested Prescriptions     Pending Prescriptions Disp Refills    traZODone (DESYREL) 100 MG tablet 10 tablet 0     Sig: Take 1 tablet by mouth Every Night.        Pharmacy where request should be sent: [facility], [employer]. [place], [place] - [address] [facility] - [phone]  - [phone]     Last office visit with prescribing clinician: 5/8/2024   Last telemedicine visit with prescribing clinician: Visit date not found   Next office visit with prescribing clinician: Visit date not found     Additional details provided by patient: PATIENT STATES WAS ADVISED TO TAKE 1.5 -2 TABLETS NIGHTLY.    Does the patient have less than a 3 day supply:  [x] Yes  [] No    Would you like a call back once the refill request has been completed: [] Yes [x] No    If the office needs to give you a call back, can they leave a voicemail: [] Yes [x] No    Saima Phan Rep   05/15/24 15:33 EDT

## 2024-05-15 NOTE — PROGRESS NOTES
"Chief Complaint  Insomnia (Worse recently. )    Subjective          Ifrah Robertson presents to [facility]  Insomnia        Patient having insomnia issues has not tried trazodone before review medications doing well with her diabetes and managing other conditions as appropriate     Objective   Vital Signs:   /75 (BP Location: Left arm, Patient Position: Sitting, Cuff Size: Adult)   Pulse 90   Temp 97.5 °F (36.4 °C)   Resp 16   Ht 172.7 cm (68\")   Wt 124 kg (272 lb 6.4 oz)   SpO2 97%   BMI 41.42 kg/m²            Physical Exam  Vitals reviewed.   Constitutional:       Appearance: Normal appearance. She is well-developed.   HENT:      Head: Normocephalic and atraumatic.      Right Ear: External ear normal.      Left Ear: External ear normal.      Nose: Nose normal.   Eyes:      Conjunctiva/sclera: Conjunctivae normal.      Pupils: Pupils are equal, round, and reactive to light.   Cardiovascular:      Rate and Rhythm: Normal rate.   Pulmonary:      Effort: Pulmonary effort is normal.      Breath sounds: Normal breath sounds.   Abdominal:      General: There is no distension.   Skin:     General: Skin is warm and dry.   Neurological:      Mental Status: She is alert and oriented to person, place, and time. Mental status is at baseline.   Psychiatric:         Mood and Affect: Mood and affect normal.         Behavior: Behavior normal.         Thought Content: Thought content normal.         Judgment: Judgment normal.          Result Review :   The following data was reviewed by: Rashid Acosta DO on 05/08/2024:  Common labs          12/7/2023    15:00 2/6/2024    16:15 5/8/2024    11:59   [facility]   BP   135/75   Weight 126 kg (278 lb)  124 kg (272 lb 6.4 oz)   Common Labs   Glucose  168     BUN  14     Creatinine  0.96     Sodium  138     Potassium  5.1     Chloride  100     Calcium  9.2     Total Cholesterol  239     Triglycerides  232     HDL Cholesterol  36   "   LDL Cholesterol   160     Hemoglobin A1C  7.20     Microalbumin, Urine  <1.2                     Assessment and Plan    Diagnoses and all orders for this visit:    1. Primary insomnia (Primary)  -     traZODone (DESYREL) 100 MG tablet; Take 1 tablet by mouth Every Night.  Dispense: 10 tablet; Refill: 0    2. Encounter for screening for lung cancer  -      CT Chest Low Dose Cancer Screening WO; Future    3. Colon cancer screening  -     Cologuard - Stool, Per Rectum; Future    4. Personal history of nicotine dependence  -      CT Chest Low Dose Cancer Screening WO; Future      Reviewed medications we will try trazodone for insomnia also ordered screening as above follow-up if no improvement see back at least every 3 to 6 months for chronic conditions      Follow Up   Return in about 3 months (around 8/8/2024), or if symptoms worsen or fail to improve, for Next scheduled follow up, Recheck.  Patient was given instructions and counseling regarding her condition or for health maintenance advice. Please see specific information pulled into the AVS if appropriate.     Transcribed from ambient dictation for Rashid Acosta DO by Rashid Acosta DO.  05/15/24   08:13 EDT

## 2024-05-30 DIAGNOSIS — E55.9 VITAMIN D DEFICIENCY: ICD-10-CM

## 2024-05-30 RX ORDER — ERGOCALCIFEROL 1.25 MG/1
50000 CAPSULE ORAL
Qty: 12 CAPSULE | Refills: 3 | Status: SHIPPED | OUTPATIENT
Start: 2024-05-30

## 2024-05-30 RX ORDER — PANTOPRAZOLE SODIUM 40 MG/1
40 TABLET, DELAYED RELEASE ORAL DAILY
Qty: 90 TABLET | Refills: 3 | Status: SHIPPED | OUTPATIENT
Start: 2024-05-30

## 2024-06-19 DIAGNOSIS — E11.65 TYPE 2 DIABETES MELLITUS WITH HYPERGLYCEMIA, WITH LONG-TERM CURRENT USE OF INSULIN: ICD-10-CM

## 2024-06-19 DIAGNOSIS — Z79.4 TYPE 2 DIABETES MELLITUS WITH HYPERGLYCEMIA, WITH LONG-TERM CURRENT USE OF INSULIN: ICD-10-CM

## 2024-06-20 RX ORDER — METFORMIN HYDROCHLORIDE 500 MG/1
TABLET, EXTENDED RELEASE ORAL
Qty: 360 TABLET | Refills: 5 | Status: SHIPPED | OUTPATIENT
Start: 2024-06-20

## 2024-07-23 RX ORDER — ESCITALOPRAM OXALATE 20 MG/1
20 TABLET ORAL DAILY
Qty: 90 TABLET | Refills: 3 | Status: SHIPPED | OUTPATIENT
Start: 2024-07-23

## 2024-09-27 DIAGNOSIS — I15.2 HYPERTENSION DUE TO ENDOCRINE DISORDER: ICD-10-CM

## 2024-09-27 RX ORDER — METOPROLOL SUCCINATE 50 MG/1
50 TABLET, EXTENDED RELEASE ORAL DAILY
Qty: 90 TABLET | Refills: 3 | Status: SHIPPED | OUTPATIENT
Start: 2024-09-27

## 2024-12-30 ENCOUNTER — TELEPHONE (OUTPATIENT)
Dept: FAMILY MEDICINE CLINIC | Facility: CLINIC | Age: 64
End: 2024-12-30

## 2025-01-07 RX ORDER — EZETIMIBE 10 MG/1
10 TABLET ORAL
Qty: 90 TABLET | Refills: 3 | Status: SHIPPED | OUTPATIENT
Start: 2025-01-07

## 2025-03-06 RX ORDER — IPRATROPIUM BROMIDE AND ALBUTEROL 20; 100 UG/1; UG/1
SPRAY, METERED RESPIRATORY (INHALATION)
Qty: 4 G | Refills: 12 | Status: SHIPPED | OUTPATIENT
Start: 2025-03-06

## 2025-05-21 ENCOUNTER — LAB (OUTPATIENT)
Dept: LAB | Facility: HOSPITAL | Age: 65
End: 2025-05-21
Payer: MEDICARE

## 2025-05-21 ENCOUNTER — OFFICE VISIT (OUTPATIENT)
Dept: FAMILY MEDICINE CLINIC | Facility: CLINIC | Age: 65
End: 2025-05-21
Payer: MEDICARE

## 2025-05-21 VITALS
RESPIRATION RATE: 16 BRPM | TEMPERATURE: 97.5 F | WEIGHT: 279 LBS | HEART RATE: 75 BPM | OXYGEN SATURATION: 97 % | BODY MASS INDEX: 42.28 KG/M2 | HEIGHT: 68 IN | SYSTOLIC BLOOD PRESSURE: 118 MMHG | DIASTOLIC BLOOD PRESSURE: 69 MMHG

## 2025-05-21 DIAGNOSIS — I15.2 HYPERTENSION DUE TO ENDOCRINE DISORDER: ICD-10-CM

## 2025-05-21 DIAGNOSIS — Z00.00 ENCOUNTER FOR SUBSEQUENT ANNUAL WELLNESS VISIT (AWV) IN MEDICARE PATIENT: ICD-10-CM

## 2025-05-21 DIAGNOSIS — E61.1 IRON DEFICIENCY: ICD-10-CM

## 2025-05-21 DIAGNOSIS — E11.65 TYPE 2 DIABETES MELLITUS WITH HYPERGLYCEMIA, WITH LONG-TERM CURRENT USE OF INSULIN: ICD-10-CM

## 2025-05-21 DIAGNOSIS — Z11.59 NEED FOR HEPATITIS C SCREENING TEST: ICD-10-CM

## 2025-05-21 DIAGNOSIS — Z11.59 NEED FOR HEPATITIS C SCREENING TEST: Primary | ICD-10-CM

## 2025-05-21 DIAGNOSIS — Z79.4 INSULIN-REQUIRING OR DEPENDENT TYPE II DIABETES MELLITUS: ICD-10-CM

## 2025-05-21 DIAGNOSIS — J30.9 ALLERGIC RHINITIS, UNSPECIFIED SEASONALITY, UNSPECIFIED TRIGGER: ICD-10-CM

## 2025-05-21 DIAGNOSIS — R11.0 NAUSEA: ICD-10-CM

## 2025-05-21 DIAGNOSIS — E55.9 VITAMIN D DEFICIENCY: ICD-10-CM

## 2025-05-21 DIAGNOSIS — E03.9 HYPOTHYROIDISM, UNSPECIFIED TYPE: ICD-10-CM

## 2025-05-21 DIAGNOSIS — Z79.4 TYPE 2 DIABETES MELLITUS WITH HYPERGLYCEMIA, WITH LONG-TERM CURRENT USE OF INSULIN: ICD-10-CM

## 2025-05-21 DIAGNOSIS — E11.9 INSULIN-REQUIRING OR DEPENDENT TYPE II DIABETES MELLITUS: ICD-10-CM

## 2025-05-21 DIAGNOSIS — K42.9 UMBILICAL HERNIA WITHOUT OBSTRUCTION AND WITHOUT GANGRENE: ICD-10-CM

## 2025-05-21 DIAGNOSIS — J42 CHRONIC BRONCHITIS, UNSPECIFIED CHRONIC BRONCHITIS TYPE: ICD-10-CM

## 2025-05-21 LAB
ALBUMIN SERPL-MCNC: 4 G/DL (ref 3.5–5.2)
ALBUMIN UR-MCNC: <1.2 MG/DL
ALBUMIN/GLOB SERPL: 1.3 G/DL
ALP SERPL-CCNC: 55 U/L (ref 39–117)
ALT SERPL W P-5'-P-CCNC: 10 U/L (ref 1–33)
ANION GAP SERPL CALCULATED.3IONS-SCNC: 10.6 MMOL/L (ref 5–15)
AST SERPL-CCNC: 15 U/L (ref 1–32)
BILIRUB SERPL-MCNC: 0.3 MG/DL (ref 0–1.2)
BUN SERPL-MCNC: 14 MG/DL (ref 8–23)
BUN/CREAT SERPL: 14.9 (ref 7–25)
CALCIUM SPEC-SCNC: 9.2 MG/DL (ref 8.6–10.5)
CHLORIDE SERPL-SCNC: 101 MMOL/L (ref 98–107)
CHOLEST SERPL-MCNC: 243 MG/DL (ref 0–200)
CO2 SERPL-SCNC: 26.4 MMOL/L (ref 22–29)
CREAT SERPL-MCNC: 0.94 MG/DL (ref 0.57–1)
CREAT UR-MCNC: 168.9 MG/DL
DEPRECATED RDW RBC AUTO: 40.9 FL (ref 37–54)
EGFRCR SERPLBLD CKD-EPI 2021: 67.9 ML/MIN/1.73
ERYTHROCYTE [DISTWIDTH] IN BLOOD BY AUTOMATED COUNT: 13.2 % (ref 12.3–15.4)
FOLATE SERPL-MCNC: 4.36 NG/ML (ref 4.78–24.2)
GLOBULIN UR ELPH-MCNC: 3 GM/DL
GLUCOSE SERPL-MCNC: 175 MG/DL (ref 65–99)
HBA1C MFR BLD: 6.6 % (ref 4.8–5.6)
HCT VFR BLD AUTO: 44.3 % (ref 34–46.6)
HCV AB SER QL: NORMAL
HDLC SERPL-MCNC: 38 MG/DL (ref 40–60)
HGB BLD-MCNC: 14.6 G/DL (ref 12–15.9)
IRON 24H UR-MRATE: 52 MCG/DL (ref 37–145)
IRON SATN MFR SERPL: 12 % (ref 20–50)
LDLC SERPL CALC-MCNC: 165 MG/DL (ref 0–100)
LDLC/HDLC SERPL: 4.25 {RATIO}
MCH RBC QN AUTO: 28 PG (ref 26.6–33)
MCHC RBC AUTO-ENTMCNC: 33 G/DL (ref 31.5–35.7)
MCV RBC AUTO: 85 FL (ref 79–97)
MICROALBUMIN/CREAT UR: NORMAL MG/G{CREAT}
PLATELET # BLD AUTO: 324 10*3/MM3 (ref 140–450)
PMV BLD AUTO: 10.7 FL (ref 6–12)
POTASSIUM SERPL-SCNC: 5 MMOL/L (ref 3.5–5.2)
PROT SERPL-MCNC: 7 G/DL (ref 6–8.5)
RBC # BLD AUTO: 5.21 10*6/MM3 (ref 3.77–5.28)
SODIUM SERPL-SCNC: 138 MMOL/L (ref 136–145)
T4 FREE SERPL-MCNC: 0.81 NG/DL (ref 0.92–1.68)
TIBC SERPL-MCNC: 417 MCG/DL (ref 298–536)
TRANSFERRIN SERPL-MCNC: 280 MG/DL (ref 200–360)
TRIGL SERPL-MCNC: 217 MG/DL (ref 0–150)
TSH SERPL DL<=0.05 MIU/L-ACNC: 15.3 UIU/ML (ref 0.27–4.2)
VIT B12 BLD-MCNC: 296 PG/ML (ref 211–946)
VLDLC SERPL-MCNC: 40 MG/DL (ref 5–40)
WBC NRBC COR # BLD AUTO: 11.53 10*3/MM3 (ref 3.4–10.8)

## 2025-05-21 PROCEDURE — 83036 HEMOGLOBIN GLYCOSYLATED A1C: CPT

## 2025-05-21 PROCEDURE — 82746 ASSAY OF FOLIC ACID SERUM: CPT

## 2025-05-21 PROCEDURE — 82607 VITAMIN B-12: CPT

## 2025-05-21 PROCEDURE — 36415 COLL VENOUS BLD VENIPUNCTURE: CPT

## 2025-05-21 PROCEDURE — 82570 ASSAY OF URINE CREATININE: CPT

## 2025-05-21 PROCEDURE — 84443 ASSAY THYROID STIM HORMONE: CPT

## 2025-05-21 PROCEDURE — 84439 ASSAY OF FREE THYROXINE: CPT

## 2025-05-21 PROCEDURE — 85027 COMPLETE CBC AUTOMATED: CPT

## 2025-05-21 PROCEDURE — 84466 ASSAY OF TRANSFERRIN: CPT

## 2025-05-21 PROCEDURE — 80061 LIPID PANEL: CPT

## 2025-05-21 PROCEDURE — 82043 UR ALBUMIN QUANTITATIVE: CPT

## 2025-05-21 PROCEDURE — 83540 ASSAY OF IRON: CPT

## 2025-05-21 PROCEDURE — 80053 COMPREHEN METABOLIC PANEL: CPT

## 2025-05-21 PROCEDURE — 86803 HEPATITIS C AB TEST: CPT

## 2025-05-21 RX ORDER — PROMETHAZINE HYDROCHLORIDE 25 MG/1
25 TABLET ORAL EVERY 8 HOURS PRN
Qty: 15 TABLET | Refills: 2 | Status: SHIPPED | OUTPATIENT
Start: 2025-05-21

## 2025-05-21 RX ORDER — ERGOCALCIFEROL 1.25 MG/1
50000 CAPSULE, LIQUID FILLED ORAL
Qty: 12 CAPSULE | Refills: 3 | Status: SHIPPED | OUTPATIENT
Start: 2025-05-21

## 2025-05-21 RX ORDER — ESCITALOPRAM OXALATE 20 MG/1
20 TABLET ORAL DAILY
Qty: 90 TABLET | Refills: 3 | Status: SHIPPED | OUTPATIENT
Start: 2025-05-21

## 2025-05-21 RX ORDER — EZETIMIBE 10 MG/1
10 TABLET ORAL
Qty: 90 TABLET | Refills: 3 | Status: SHIPPED | OUTPATIENT
Start: 2025-05-21

## 2025-05-21 RX ORDER — LEVOTHYROXINE SODIUM 200 MCG
200 TABLET ORAL DAILY
Qty: 90 TABLET | Refills: 3 | Status: SHIPPED | OUTPATIENT
Start: 2025-05-21 | End: 2025-05-23 | Stop reason: SDUPTHER

## 2025-05-21 RX ORDER — ALBUTEROL SULFATE 90 UG/1
2 INHALANT RESPIRATORY (INHALATION) EVERY 4 HOURS PRN
Qty: 8 G | Refills: 5 | Status: SHIPPED | OUTPATIENT
Start: 2025-05-21

## 2025-05-21 RX ORDER — METOPROLOL SUCCINATE 50 MG/1
50 TABLET, EXTENDED RELEASE ORAL DAILY
Qty: 90 TABLET | Refills: 3 | Status: SHIPPED | OUTPATIENT
Start: 2025-05-21

## 2025-05-21 RX ORDER — PANTOPRAZOLE SODIUM 40 MG/1
40 TABLET, DELAYED RELEASE ORAL DAILY
Qty: 90 TABLET | Refills: 3 | Status: SHIPPED | OUTPATIENT
Start: 2025-05-21

## 2025-05-21 NOTE — PROGRESS NOTES
Subjective   The ABCs of the Annual Wellness Visit  Medicare Wellness Visit      Ifrah Robertson is a 64 y.o. patient who presents for a Medicare Wellness Visit.    The following portions of the patient's history were reviewed and   updated as appropriate: allergies, current medications, past family history, past medical history, past social history, past surgical history, and problem list.    Compared to one year ago, the patient's physical   health is the same.  Compared to one year ago, the patient's mental   health is the same.    Recent Hospitalizations:  She was not admitted to the hospital during the last year.     Current Medical Providers:  Patient Care Team:  Rashid Acosta DO as PCP - General (Family Medicine)  Felipe Cameron Jr., DO (Internal Medicine)    Outpatient Medications Prior to Visit   Medication Sig Dispense Refill    Aspirin Low Dose 81 MG EC tablet TAKE ONE TABLET BY MOUTH ONCE DAILY (Patient taking differently: Take 1 tablet by mouth Daily.) 30 tablet 5    clobetasol (TEMOVATE) 0.05 % ointment Apply 1 application  topically to the appropriate area as directed 2 (Two) Times a Day. 45 g 1    Combivent Respimat  MCG/ACT inhaler INHALE 1 PUFF 4 (FOUR) TIMES A DAY AS NEEDED FOR WHEEZING. 4 g 12    diphenhydrAMINE (BENADRYL) 50 MG capsule Take 1 capsule by mouth At Night As Needed for Sleep.      EPINEPHrine (EPIPEN) 0.3 MG/0.3ML solution auto-injector injection Inject 0.3 mL into the appropriate muscle as directed by prescriber 1 (One) Time.      fluticasone (Flonase) 50 MCG/ACT nasal spray 2 sprays into the nostril(s) as directed by provider Daily. 11.1 g 11    metFORMIN ER (GLUCOPHAGE-XR) 500 MG 24 hr tablet TAKE 4 TABLETS EVERY MORNING 360 tablet 5    escitalopram (LEXAPRO) 20 MG tablet TAKE 1 TABLET BY MOUTH DAILY. 90 tablet 3    ezetimibe (ZETIA) 10 MG tablet TAKE ONE TABLET BY MOUTH AT BEDTIME 90 tablet 3    metoprolol succinate XL (TOPROL-XL) 50 MG 24 hr tablet TAKE 1 TABLET BY  MOUTH DAILY. 90 tablet 3    pantoprazole (PROTONIX) 40 MG EC tablet TAKE ONE TABLET BY MOUTH ONCE DAILY 90 tablet 3    Synthroid 200 MCG tablet Take 1 tablet by mouth Daily. 90 tablet 3    Tirzepatide (Mounjaro) 7.5 MG/0.5ML solution pen-injector Inject 0.5 mL under the skin into the appropriate area as directed 1 (One) Time Per Week. 2 mL 2    vitamin D (ERGOCALCIFEROL) 1.25 MG (45075 UT) capsule capsule TAKE ONE CAPSULE BY MOUTH ONE TIME PER WEEK 12 capsule 3    famotidine (PEPCID) 20 MG tablet  (Patient not taking: Reported on 5/21/2025)      montelukast (SINGULAIR) 10 MG tablet  (Patient not taking: Reported on 5/21/2025)      Ozempic, 2 MG/DOSE, 8 MG/3ML solution pen-injector  (Patient not taking: Reported on 5/21/2025)      traZODone (DESYREL) 100 MG tablet Take 2 tablets by mouth Every Night. (Patient not taking: Reported on 5/21/2025) 60 tablet 0     No facility-administered medications prior to visit.     No opioid medication identified on active medication list. I have reviewed chart for other potential  high risk medication/s and harmful drug interactions in the elderly.      Aspirin is on active medication list. Aspirin use is indicated based on review of current medical condition/s. Pros and cons of this therapy have been discussed today. Benefits of this medication outweigh potential harm.  Patient has been encouraged to continue taking this medication.  .      Patient Active Problem List   Diagnosis    Type 2 diabetes mellitus with diabetic neuropathy, with long-term current use of insulin    Esophageal reflux    High cholesterol    Hypothyroidism    Primary insomnia    Iron deficiency anemia secondary to inadequate dietary iron intake    Depression    Lichen sclerosus et atrophicus of the vulva    Nicotine addiction    HAILEY (obstructive sleep apnea)    Stroke    Generalized osteoarthritis of multiple sites    Hemorrhoids    Leukocytosis    Vitamin D deficiency disease    Hypocalcemia    Abnormal  "uterine bleeding    Encounter for subsequent annual wellness visit (AWV) in Medicare patient    EIN (endometrial intraepithelial neoplasia)    Endometrial intraepithelial neoplasia (EIN)    Allergic rhinitis    Bronchitis, mucopurulent recurrent    Class 3 severe obesity due to excess calories with serious comorbidity and body mass index (BMI) of 45.0 to 49.9 in adult    Hypertension due to endocrine disorder    Anaphylaxis, initial encounter    Tooth abscess    Biliary colic    Iron deficiency    Umbilical hernia without obstruction and without gangrene     Advance Care Planning Advance Directive is not on file.  ACP discussion was declined by the patient. Patient has an advance directive (not in EMR), copy requested.            Objective   Vitals:    05/21/25 1111   BP: 118/69   BP Location: Left arm   Patient Position: Sitting   Cuff Size: Adult   Pulse: 75   Resp: 16   Temp: 97.5 °F (36.4 °C)   SpO2: 97%   Weight: 127 kg (279 lb)   Height: 172.7 cm (68\")   PainSc: 0-No pain       Estimated body mass index is 42.42 kg/m² as calculated from the following:    Height as of this encounter: 172.7 cm (68\").    Weight as of this encounter: 127 kg (279 lb).    Class 3 Severe Obesity (BMI >=40). Obesity-related health conditions include the following: hypertension, diabetes mellitus, dyslipidemias, GERD, and osteoarthritis. Obesity is unchanged. BMI is is above average; no BMI management plan is appropriate. We discussed portion control and increasing exercise.           Does the patient have evidence of cognitive impairment? No  Lab Results   Component Value Date    TRIG 217 (H) 05/21/2025    HDL 38 (L) 05/21/2025     (H) 05/21/2025    VLDL 40 05/21/2025    HGBA1C 6.60 (H) 05/21/2025                                                                                                Health  Risk Assessment    Smoking Status:  Social History     Tobacco Use   Smoking Status Every Day    Current packs/day: 1.00    Average " packs/day: 1 pack/day for 45.4 years (45.4 ttl pk-yrs)    Types: Cigarettes    Start date:    Smokeless Tobacco Never     Alcohol Consumption:  Social History     Substance and Sexual Activity   Alcohol Use Never       Fall Risk Screen  JANIAADI Fall Risk Assessment was completed, and patient is at MODERATE risk for falls. Assessment completed on:2025    Depression Screening   Little interest or pleasure in doing things? Not at all   Feeling down, depressed, or hopeless? Not at all   PHQ-2 Total Score 0      Health Habits and Functional and Cognitive Screenin/21/2025    11:00 AM   Functional & Cognitive Status   Do you have difficulty preparing food and eating? No   Do you have difficulty bathing yourself, getting dressed or grooming yourself? No   Do you have difficulty using the toilet? No   Do you have difficulty moving around from place to place? No   Do you have trouble with steps or getting out of a bed or a chair? Yes   Current Diet Unhealthy Diet   Dental Exam Not up to date   Eye Exam Not up to date   Exercise (times per week) 0 times per week   Current Exercises Include No Regular Exercise   Do you need help using the phone?  No   Are you deaf or do you have serious difficulty hearing?  Yes   Do you need help to go to places out of walking distance? No   Do you need help shopping? No   Do you need help preparing meals?  No   Do you need help with housework?  No   Do you need help with laundry? No   Do you need help taking your medications? Yes   Do you need help managing money? No   Do you ever drive or ride in a car without wearing a seat belt? No   Have you felt unusual stress, anger or loneliness in the last month? No   Who do you live with? Sibling   If you need help, do you have trouble finding someone available to you? No   Have you been bothered in the last four weeks by sexual problems? No   Do you have difficulty concentrating, remembering or making decisions? Yes            Age-appropriate Screening Schedule:  Refer to the list below for future screening recommendations based on patient's age, sex and/or medical conditions. Orders for these recommended tests are listed in the plan section. The patient has been provided with a written plan.    Health Maintenance List  Health Maintenance   Topic Date Due    Pneumococcal Vaccine 50+ (1 of 2 - PCV) Never done    TDAP/TD VACCINES (1 - Tdap) Never done    COLORECTAL CANCER SCREENING  Never done    ZOSTER VACCINE (1 of 2) Never done    LUNG CANCER SCREENING  10/05/2018    DIABETIC FOOT EXAM  08/11/2023    DIABETIC EYE EXAM  05/15/2024    COVID-19 Vaccine (5 - 2024-25 season) 09/01/2024    PAP SMEAR  03/11/2025    INFLUENZA VACCINE  07/01/2025    MAMMOGRAM  10/03/2025    HEMOGLOBIN A1C  11/21/2025    ANNUAL WELLNESS VISIT  05/21/2026    LIPID PANEL  05/21/2026    URINE MICROALBUMIN-CREATININE RATIO (uACR)  05/21/2026    HEPATITIS C SCREENING  Completed                                                                                                                                                CMS Preventative Services Quick Reference  Risk Factors Identified During Encounter  reviewed    The above risks/problems have been discussed with the patient.  Pertinent information has been shared with the patient in the After Visit Summary.  An After Visit Summary and PPPS were made available to the patient.    Follow Up:   Next Medicare Wellness visit to be scheduled in 1 year.         Additional E&M Note during same encounter follows:  Patient has additional, significant, and separately identifiable condition(s)/problem(s) that require work above and beyond the Medicare Wellness Visit     Chief Complaint  Medicare Wellness-subsequent (Pt c/o of palpitations chronic. Pt feels that it may be due to her thyroid, she has not been taking her synthroid like she is supposed to. )    Subjective    HPI         The patient is a 64-year-old female who  "presents for a Medicare wellness visit and complaints of palpitations.    She reports experiencing palpitations, which she initially suspected to be atrial fibrillation or defibrillation. These episodes are more pronounced during the night, characterized by a sensation of skipped heartbeats and shortness of breath. She attributes these symptoms to her discontinuation of Synthroid, a medication she has been on for an extended period without perceiving any benefits. She has been prescribed 200 mcg of Synthroid but has stopped taking it due to dissatisfaction with its effectiveness.    Additionally, she suspects the presence of an umbilical hernia, describing a popping sensation in her navel area. However, this does not cause her any discomfort. She has been cautious about lifting heavy objects and engaging in strenuous activities. She expresses a preference for non-surgical interventions due to her extensive surgical history.    She has not undergone annual lung cancer screenings and expresses a desire to avoid them.          Objective   Vital Signs:  /69 (BP Location: Left arm, Patient Position: Sitting, Cuff Size: Adult)   Pulse 75   Temp 97.5 °F (36.4 °C)   Resp 16   Ht 172.7 cm (68\")   Wt 127 kg (279 lb)   SpO2 97%   BMI 42.42 kg/m²   Physical Exam  Vitals reviewed.   Constitutional:       Appearance: Normal appearance. She is well-developed.   HENT:      Head: Normocephalic and atraumatic.      Right Ear: External ear normal.      Left Ear: External ear normal.      Nose: Nose normal.   Eyes:      Conjunctiva/sclera: Conjunctivae normal.      Pupils: Pupils are equal, round, and reactive to light.   Cardiovascular:      Rate and Rhythm: Normal rate.   Pulmonary:      Effort: Pulmonary effort is normal.      Breath sounds: Normal breath sounds.   Abdominal:      General: There is no distension.   Skin:     General: Skin is warm and dry.   Neurological:      Mental Status: She is alert and oriented to " person, place, and time. Mental status is at baseline.   Psychiatric:         Mood and Affect: Mood and affect normal.         Behavior: Behavior normal.         Thought Content: Thought content normal.         Judgment: Judgment normal.           Cardiovascular: Regular rate and rhythm, no murmurs, rubs, or gallops    The following data was reviewed by: Rashid Acosta DO on 05/21/2025:    Common labs          5/21/2025    11:11 5/21/2025    11:53 5/21/2025    11:54   Patient Spotlight   /69     Weight 127 kg (279 lb)     Common Labs   Glucose  175     BUN  14     Creatinine  0.94     Sodium  138     Potassium  5.0     Chloride  101     Calcium  9.2     Albumin  4.0     Total Bilirubin  0.3     Alkaline Phosphatase  55     AST (SGOT)  15     ALT (SGPT)  10     WBC  11.53     Hemoglobin  14.6     Hematocrit  44.3     Platelets  324     Total Cholesterol  243     Triglycerides  217     HDL Cholesterol  38     LDL Cholesterol   165     Hemoglobin A1C  6.60     Microalbumin, Urine   <1.2        Results             Assessment and Plan        1. Palpitations.  - Reports experiencing palpitations, particularly at night, which she attributes to not taking her Synthroid.  - Previously prescribed a high dose of 200 mcg but stopped taking it due to perceived lack of efficacy.  - A thyroid function test will be conducted to assess current thyroid levels.  - Advised to resume Synthroid medication as prescribed.    2. Umbilical hernia.  - Reports a sensation of a hernia in the belly button area, described as popping in and out.  - Advised to monitor the hernia for any signs of obstruction or pain.  - If it becomes bothersome or painful, further evaluation for potential repair is recommended.  - Advised to avoid heavy lifting to prevent exacerbation.    3. Medicare wellness visit.  - Blood pressure and heart rate are within normal limits.  - Declines lung cancer screening.     Reviewed AWV recommendations and ordered as  appropriate, follow up annually for review, sooner if concerns    No depression, falls, dementia symptoms or other  Risk and home safety assessment good    Followed up on chronic conditions and ordered refills, appropriate monitoring labs additionally as needed every 6 months or sooner if indicated, controlled stable    Follow up at least every 3-6 months for chronic conditions and as scheduled with appropriate specialists as indicated otherwise      Follow Up   Return in about 3 months (around 8/21/2025), or if symptoms worsen or fail to improve, for Next scheduled follow up, Recheck.  Patient was given instructions and counseling regarding her condition or for health maintenance advice. Please see specific information pulled into the AVS if appropriate.  Patient or patient representative verbalized consent for the use of Ambient Listening during the visit with  Rashid Acosta DO for chart documentation. 5/29/2025  15:07 EDT

## 2025-05-23 DIAGNOSIS — E03.9 HYPOTHYROIDISM, UNSPECIFIED TYPE: ICD-10-CM

## 2025-05-23 NOTE — TELEPHONE ENCOUNTER
Rx Refill Note  Requested Prescriptions     Pending Prescriptions Disp Refills    Synthroid 200 MCG tablet 90 tablet 3     Sig: Take 1 tablet by mouth Daily.      Last office visit with prescribing clinician: 5/21/2025   Last telemedicine visit with prescribing clinician: Visit date not found   Next office visit with prescribing clinician: Visit date not found                         Would you like a call back once the refill request has been completed: [] Yes [] No    If the office needs to give you a call back, can they leave a voicemail: [] Yes [] No    Columba Casillas MA  05/23/25, 08:05 EDT

## 2025-05-27 RX ORDER — LEVOTHYROXINE SODIUM 200 MCG
200 TABLET ORAL DAILY
Qty: 90 TABLET | Refills: 3 | Status: SHIPPED | OUTPATIENT
Start: 2025-05-27 | End: 2025-06-02

## 2025-06-02 ENCOUNTER — TELEPHONE (OUTPATIENT)
Dept: FAMILY MEDICINE CLINIC | Facility: CLINIC | Age: 65
End: 2025-06-02

## 2025-06-02 RX ORDER — LEVOTHYROXINE SODIUM 100 UG/1
100 TABLET ORAL
Qty: 30 TABLET | Refills: 2 | Status: SHIPPED | OUTPATIENT
Start: 2025-06-02 | End: 2025-06-03 | Stop reason: SDUPTHER

## 2025-06-02 NOTE — TELEPHONE ENCOUNTER
Caller: Ifrah Robertson    Relationship: Self    Best call back number: [phone]     What is the best time to reach you: ANYTIME    Who are you requesting to speak with (clinical staff, provider,  specific staff member): CLINICAL    What was the call regarding: DOSAGE OF HER SYNTHROID MEDICATION Levothyroxine (Synthroid) 100 MCG tablet

## 2025-06-03 ENCOUNTER — TELEPHONE (OUTPATIENT)
Dept: FAMILY MEDICINE CLINIC | Facility: CLINIC | Age: 65
End: 2025-06-03
Payer: MEDICARE

## 2025-06-03 RX ORDER — LEVOTHYROXINE SODIUM 100 MCG
100 TABLET ORAL
Qty: 30 TABLET | Refills: 2 | Status: SHIPPED | OUTPATIENT
Start: 2025-06-03

## 2025-06-03 NOTE — TELEPHONE ENCOUNTER
Caller: RENO ELLINGTON    Relationship:     Best call back number: [phone]     What form or medical record are you requesting: LAST OFFICE NOTE    How would you like to receive the form or medical records (pick-up, mail, fax): FAX  If fax, what is the fax number: [phone]      Timeframe paperwork needed: AS SOON AS POSSIBLE    Additional notes: THEY NEED OFFICE NOTE IN ORDER TO SEND CGM SUPPLIES TO PATIENT      THEY HAVE FAXED REQUEST TODAY

## 2025-07-03 ENCOUNTER — TELEPHONE (OUTPATIENT)
Dept: FAMILY MEDICINE CLINIC | Facility: CLINIC | Age: 65
End: 2025-07-03
Payer: MEDICARE

## 2025-07-03 DIAGNOSIS — E11.65 TYPE 2 DIABETES MELLITUS WITH HYPERGLYCEMIA, WITH LONG-TERM CURRENT USE OF INSULIN: Primary | ICD-10-CM

## 2025-07-03 DIAGNOSIS — Z79.4 TYPE 2 DIABETES MELLITUS WITH HYPERGLYCEMIA, WITH LONG-TERM CURRENT USE OF INSULIN: Primary | ICD-10-CM

## 2025-07-03 NOTE — TELEPHONE ENCOUNTER
Caller: Ifrah Robertson    Relationship: Self    Best call back number: [phone]     What medication are you requesting: NEEDS NEW PRESCRIPTION FOR HER FREESTYLE GAVIN 2  - SHE HAS SENSORS, BUT HER  IS NOT WORKING      If a prescription is needed, what is your preferred pharmacy and phone number:  [facility] - [place], [place] - [address] - [phone]  - [phone] FX

## 2025-07-15 ENCOUNTER — TELEPHONE (OUTPATIENT)
Dept: FAMILY MEDICINE CLINIC | Facility: CLINIC | Age: 65
End: 2025-07-15

## 2025-07-29 ENCOUNTER — HOSPITAL ENCOUNTER (INPATIENT)
Facility: HOSPITAL | Age: 65
LOS: 2 days | Discharge: HOME | End: 2025-07-31
Attending: EMERGENCY MEDICINE | Admitting: FAMILY MEDICINE
Payer: MEDICARE

## 2025-07-29 ENCOUNTER — APPOINTMENT (OUTPATIENT)
Dept: CT IMAGING | Facility: HOSPITAL | Age: 65
End: 2025-07-29
Payer: MEDICARE

## 2025-07-29 ENCOUNTER — APPOINTMENT (OUTPATIENT)
Dept: GENERAL RADIOLOGY | Facility: HOSPITAL | Age: 65
End: 2025-07-29
Payer: MEDICARE

## 2025-07-29 DIAGNOSIS — I63.9 CEREBROVASCULAR ACCIDENT (CVA), UNSPECIFIED MECHANISM: Primary | ICD-10-CM

## 2025-07-29 DIAGNOSIS — R13.12 OROPHARYNGEAL DYSPHAGIA: ICD-10-CM

## 2025-07-29 DIAGNOSIS — Z78.9 DECREASED ACTIVITIES OF DAILY LIVING (ADL): ICD-10-CM

## 2025-07-29 DIAGNOSIS — R26.2 DIFFICULTY IN WALKING: ICD-10-CM

## 2025-07-29 LAB
ALBUMIN SERPL-MCNC: 4.4 G/DL (ref 3.5–5.2)
ALBUMIN/GLOB SERPL: 1.6 G/DL
ALP SERPL-CCNC: 58 U/L (ref 39–117)
ALT SERPL W P-5'-P-CCNC: 11 U/L (ref 1–33)
ANION GAP SERPL CALCULATED.3IONS-SCNC: 15.1 MMOL/L (ref 5–15)
APTT PPP: 22.8 SECONDS (ref 24.2–34.2)
AST SERPL-CCNC: 11 U/L (ref 1–32)
BASOPHILS # BLD AUTO: 0.13 10*3/MM3 (ref 0–0.2)
BASOPHILS NFR BLD AUTO: 1 % (ref 0–1.5)
BILIRUB SERPL-MCNC: 0.5 MG/DL (ref 0–1.2)
BUN SERPL-MCNC: 5.4 MG/DL (ref 8–23)
BUN/CREAT SERPL: 7.8 (ref 7–25)
CALCIUM SPEC-SCNC: 8.2 MG/DL (ref 8.6–10.5)
CHLORIDE SERPL-SCNC: 100 MMOL/L (ref 98–107)
CO2 SERPL-SCNC: 22.9 MMOL/L (ref 22–29)
CREAT SERPL-MCNC: 0.69 MG/DL (ref 0.57–1)
DEPRECATED RDW RBC AUTO: 44 FL (ref 37–54)
EGFRCR SERPLBLD CKD-EPI 2021: 97.1 ML/MIN/1.73
EOSINOPHIL # BLD AUTO: 0.26 10*3/MM3 (ref 0–0.4)
EOSINOPHIL NFR BLD AUTO: 1.9 % (ref 0.3–6.2)
ERYTHROCYTE [DISTWIDTH] IN BLOOD BY AUTOMATED COUNT: 13.9 % (ref 12.3–15.4)
GLOBULIN UR ELPH-MCNC: 2.7 GM/DL
GLUCOSE BLDC GLUCOMTR-MCNC: 132 MG/DL (ref 70–99)
GLUCOSE SERPL-MCNC: 148 MG/DL (ref 65–99)
HCT VFR BLD AUTO: 47.4 % (ref 34–46.6)
HGB BLD-MCNC: 15.5 G/DL (ref 12–15.9)
HOLD SPECIMEN: NORMAL
HOLD SPECIMEN: NORMAL
IMM GRANULOCYTES # BLD AUTO: 0.09 10*3/MM3 (ref 0–0.05)
IMM GRANULOCYTES NFR BLD AUTO: 0.7 % (ref 0–0.5)
INR PPP: 0.91 (ref 0.86–1.15)
LYMPHOCYTES # BLD AUTO: 2.57 10*3/MM3 (ref 0.7–3.1)
LYMPHOCYTES NFR BLD AUTO: 19.1 % (ref 19.6–45.3)
MCH RBC QN AUTO: 28.1 PG (ref 26.6–33)
MCHC RBC AUTO-ENTMCNC: 32.7 G/DL (ref 31.5–35.7)
MCV RBC AUTO: 86 FL (ref 79–97)
MONOCYTES # BLD AUTO: 0.71 10*3/MM3 (ref 0.1–0.9)
MONOCYTES NFR BLD AUTO: 5.3 % (ref 5–12)
NEUTROPHILS NFR BLD AUTO: 72 % (ref 42.7–76)
NEUTROPHILS NFR BLD AUTO: 9.7 10*3/MM3 (ref 1.7–7)
NRBC BLD AUTO-RTO: 0 /100 WBC (ref 0–0.2)
PLATELET # BLD AUTO: 315 10*3/MM3 (ref 140–450)
PMV BLD AUTO: 10.3 FL (ref 6–12)
POTASSIUM SERPL-SCNC: 4.3 MMOL/L (ref 3.5–5.2)
PROT SERPL-MCNC: 7.1 G/DL (ref 6–8.5)
PROTHROMBIN TIME: 12.6 SECONDS (ref 11.8–14.9)
RBC # BLD AUTO: 5.51 10*6/MM3 (ref 3.77–5.28)
SODIUM SERPL-SCNC: 138 MMOL/L (ref 136–145)
WBC NRBC COR # BLD AUTO: 13.46 10*3/MM3 (ref 3.4–10.8)
WHOLE BLOOD HOLD COAG: NORMAL
WHOLE BLOOD HOLD SPECIMEN: NORMAL

## 2025-07-29 PROCEDURE — 71045 X-RAY EXAM CHEST 1 VIEW: CPT

## 2025-07-29 PROCEDURE — 83036 HEMOGLOBIN GLYCOSYLATED A1C: CPT | Performed by: FAMILY MEDICINE

## 2025-07-29 PROCEDURE — 70450 CT HEAD/BRAIN W/O DYE: CPT

## 2025-07-29 PROCEDURE — 85730 THROMBOPLASTIN TIME PARTIAL: CPT | Performed by: EMERGENCY MEDICINE

## 2025-07-29 PROCEDURE — 85610 PROTHROMBIN TIME: CPT | Performed by: EMERGENCY MEDICINE

## 2025-07-29 PROCEDURE — 80053 COMPREHEN METABOLIC PANEL: CPT | Performed by: EMERGENCY MEDICINE

## 2025-07-29 PROCEDURE — 99291 CRITICAL CARE FIRST HOUR: CPT

## 2025-07-29 PROCEDURE — 80061 LIPID PANEL: CPT | Performed by: FAMILY MEDICINE

## 2025-07-29 PROCEDURE — 25510000001 IOPAMIDOL PER 1 ML: Performed by: EMERGENCY MEDICINE

## 2025-07-29 PROCEDURE — 70496 CT ANGIOGRAPHY HEAD: CPT

## 2025-07-29 PROCEDURE — 84443 ASSAY THYROID STIM HORMONE: CPT | Performed by: FAMILY MEDICINE

## 2025-07-29 PROCEDURE — 82948 REAGENT STRIP/BLOOD GLUCOSE: CPT

## 2025-07-29 PROCEDURE — 85025 COMPLETE CBC W/AUTO DIFF WBC: CPT | Performed by: EMERGENCY MEDICINE

## 2025-07-29 PROCEDURE — 0042T HC CT CEREBRAL PERFUSION W/WO CONTRAST: CPT

## 2025-07-29 PROCEDURE — 93005 ELECTROCARDIOGRAM TRACING: CPT | Performed by: EMERGENCY MEDICINE

## 2025-07-29 PROCEDURE — 84439 ASSAY OF FREE THYROXINE: CPT | Performed by: FAMILY MEDICINE

## 2025-07-29 PROCEDURE — 99222 1ST HOSP IP/OBS MODERATE 55: CPT | Performed by: PSYCHIATRY & NEUROLOGY

## 2025-07-29 PROCEDURE — 70498 CT ANGIOGRAPHY NECK: CPT

## 2025-07-29 PROCEDURE — 25010000002 ONDANSETRON PER 1 MG

## 2025-07-29 RX ORDER — IOPAMIDOL 755 MG/ML
100 INJECTION, SOLUTION INTRAVASCULAR
Status: DISCONTINUED | OUTPATIENT
Start: 2025-07-29 | End: 2025-07-31 | Stop reason: HOSPADM

## 2025-07-29 RX ORDER — ONDANSETRON 2 MG/ML
4 INJECTION INTRAMUSCULAR; INTRAVENOUS ONCE
Status: COMPLETED | OUTPATIENT
Start: 2025-07-29 | End: 2025-07-29

## 2025-07-29 RX ORDER — LEVOCETIRIZINE DIHYDROCHLORIDE 5 MG/1
5 TABLET, FILM COATED ORAL EVERY EVENING
COMMUNITY
Start: 2025-06-23

## 2025-07-29 RX ORDER — IOPAMIDOL 755 MG/ML
100 INJECTION, SOLUTION INTRAVASCULAR
Status: COMPLETED | OUTPATIENT
Start: 2025-07-29 | End: 2025-07-29

## 2025-07-29 RX ORDER — SODIUM CHLORIDE 0.9 % (FLUSH) 0.9 %
10 SYRINGE (ML) INJECTION AS NEEDED
Status: DISCONTINUED | OUTPATIENT
Start: 2025-07-29 | End: 2025-07-31 | Stop reason: HOSPADM

## 2025-07-29 RX ORDER — ONDANSETRON 2 MG/ML
INJECTION INTRAMUSCULAR; INTRAVENOUS
Status: COMPLETED
Start: 2025-07-29 | End: 2025-07-29

## 2025-07-29 RX ADMIN — IOPAMIDOL 40 ML: 755 INJECTION, SOLUTION INTRAVENOUS at 21:24

## 2025-07-29 RX ADMIN — ONDANSETRON 4 MG: 2 INJECTION INTRAMUSCULAR; INTRAVENOUS at 21:16

## 2025-07-29 RX ADMIN — ONDANSETRON 4 MG: 2 INJECTION, SOLUTION INTRAMUSCULAR; INTRAVENOUS at 21:16

## 2025-07-29 RX ADMIN — IOPAMIDOL 100 ML: 755 INJECTION, SOLUTION INTRAVENOUS at 21:26

## 2025-07-30 ENCOUNTER — APPOINTMENT (OUTPATIENT)
Dept: CARDIOLOGY | Facility: HOSPITAL | Age: 65
End: 2025-07-30
Payer: MEDICARE

## 2025-07-30 PROBLEM — I10 HTN (HYPERTENSION): Status: ACTIVE | Noted: 2025-07-30

## 2025-07-30 LAB
AORTIC DIMENSIONLESS INDEX: 0.72 (DI)
ASCENDING AORTA: 3.1 CM
AV MEAN PRESS GRAD SYS DOP V1V2: 3.6 MMHG
AV VMAX SYS DOP: 124.3 CM/SEC
BH CV ECHO MEAS - AO MAX PG: 6.2 MMHG
BH CV ECHO MEAS - AO ROOT DIAM: 3 CM
BH CV ECHO MEAS - AO V2 VTI: 27.1 CM
BH CV ECHO MEAS - AVA(I,D): 2.26 CM2
BH CV ECHO MEAS - EDV(MOD-SP2): 75.8 ML
BH CV ECHO MEAS - EDV(MOD-SP4): 52.2 ML
BH CV ECHO MEAS - EF(MOD-SP2): 64.4 %
BH CV ECHO MEAS - EF(MOD-SP4): 57.3 %
BH CV ECHO MEAS - ESV(MOD-SP2): 27 ML
BH CV ECHO MEAS - ESV(MOD-SP4): 22.3 ML
BH CV ECHO MEAS - IVS/LVPW: 0.92 CM
BH CV ECHO MEAS - IVSD: 1.2 CM
BH CV ECHO MEAS - LA DIMENSION: 2.9 CM
BH CV ECHO MEAS - LAT PEAK E' VEL: 8.2 CM/SEC
BH CV ECHO MEAS - LV DIASTOLIC VOL/BSA (35-75): 22.4 CM2
BH CV ECHO MEAS - LV MAX PG: 2.6 MMHG
BH CV ECHO MEAS - LV MEAN PG: 1.5 MMHG
BH CV ECHO MEAS - LV SYSTOLIC VOL/BSA (12-30): 9.6 CM2
BH CV ECHO MEAS - LV V1 MAX: 80 CM/SEC
BH CV ECHO MEAS - LV V1 VTI: 19.5 CM
BH CV ECHO MEAS - LVIDD: 4.6 CM
BH CV ECHO MEAS - LVIDS: 2.8 CM
BH CV ECHO MEAS - LVOT AREA: 3.1 CM2
BH CV ECHO MEAS - LVOT DIAM: 2 CM
BH CV ECHO MEAS - LVPWD: 1.3 CM
BH CV ECHO MEAS - MED PEAK E' VEL: 9.4 CM/SEC
BH CV ECHO MEAS - MV A MAX VEL: 80.9 CM/SEC
BH CV ECHO MEAS - MV DEC SLOPE: 292 CM/SEC2
BH CV ECHO MEAS - MV E MAX VEL: 73 CM/SEC
BH CV ECHO MEAS - MV E/A: 0.9
BH CV ECHO MEAS - MV MAX PG: 3.3 MMHG
BH CV ECHO MEAS - MV MEAN PG: 1.4 MMHG
BH CV ECHO MEAS - MV P1/2T: 72 MSEC
BH CV ECHO MEAS - MV V2 VTI: 25.4 CM
BH CV ECHO MEAS - MVA(P1/2T): 3 CM2
BH CV ECHO MEAS - MVA(VTI): 2.41 CM2
BH CV ECHO MEAS - RVDD: 2.5 CM
BH CV ECHO MEAS - SV(LVOT): 61.2 ML
BH CV ECHO MEAS - SV(MOD-SP2): 48.8 ML
BH CV ECHO MEAS - SV(MOD-SP4): 29.9 ML
BH CV ECHO MEAS - SVI(LVOT): 26.3 ML/M2
BH CV ECHO MEAS - SVI(MOD-SP2): 20.9 ML/M2
BH CV ECHO MEAS - SVI(MOD-SP4): 12.8 ML/M2
BH CV ECHO MEASUREMENTS AVERAGE E/E' RATIO: 8.3
BILIRUB UR QL STRIP: NEGATIVE
CHOLEST SERPL-MCNC: 298 MG/DL (ref 0–200)
CLARITY UR: CLEAR
COLOR UR: YELLOW
GLUCOSE BLDC GLUCOMTR-MCNC: 120 MG/DL (ref 70–99)
GLUCOSE BLDC GLUCOMTR-MCNC: 134 MG/DL (ref 70–99)
GLUCOSE BLDC GLUCOMTR-MCNC: 140 MG/DL (ref 70–99)
GLUCOSE BLDC GLUCOMTR-MCNC: 207 MG/DL (ref 70–99)
GLUCOSE UR STRIP-MCNC: NEGATIVE MG/DL
HBA1C MFR BLD: 6.6 % (ref 4.8–5.6)
HDLC SERPL-MCNC: 38 MG/DL (ref 40–60)
HGB UR QL STRIP.AUTO: NEGATIVE
KETONES UR QL STRIP: ABNORMAL
LDLC SERPL CALC-MCNC: 210 MG/DL (ref 0–100)
LDLC/HDLC SERPL: 5.54 {RATIO}
LEFT ATRIUM VOLUME INDEX: 23.5 ML/M2
LEUKOCYTE ESTERASE UR QL STRIP.AUTO: NEGATIVE
LV EF BIPLANE MOD: 61 %
NITRITE UR QL STRIP: NEGATIVE
PH UR STRIP.AUTO: 6.5 [PH] (ref 5–8)
PROT UR QL STRIP: NEGATIVE
SP GR UR STRIP: >1.03 (ref 1–1.03)
T4 FREE SERPL-MCNC: 0.94 NG/DL (ref 0.92–1.68)
TRIGL SERPL-MCNC: 248 MG/DL (ref 0–150)
TSH SERPL DL<=0.05 MIU/L-ACNC: 10.01 UIU/ML (ref 0.27–4.2)
UROBILINOGEN UR QL STRIP: ABNORMAL
VLDLC SERPL-MCNC: 50 MG/DL (ref 5–40)

## 2025-07-30 PROCEDURE — 25010000002 PROCHLORPERAZINE 10 MG/2ML SOLUTION: Performed by: FAMILY MEDICINE

## 2025-07-30 PROCEDURE — 94640 AIRWAY INHALATION TREATMENT: CPT

## 2025-07-30 PROCEDURE — 81003 URINALYSIS AUTO W/O SCOPE: CPT | Performed by: EMERGENCY MEDICINE

## 2025-07-30 PROCEDURE — 63710000001 INSULIN LISPRO (HUMAN) PER 5 UNITS: Performed by: FAMILY MEDICINE

## 2025-07-30 PROCEDURE — 93306 TTE W/DOPPLER COMPLETE: CPT | Performed by: INTERNAL MEDICINE

## 2025-07-30 PROCEDURE — 99232 SBSQ HOSP IP/OBS MODERATE 35: CPT | Performed by: PSYCHIATRY & NEUROLOGY

## 2025-07-30 PROCEDURE — 99223 1ST HOSP IP/OBS HIGH 75: CPT | Performed by: FAMILY MEDICINE

## 2025-07-30 PROCEDURE — 82948 REAGENT STRIP/BLOOD GLUCOSE: CPT

## 2025-07-30 PROCEDURE — 94799 UNLISTED PULMONARY SVC/PX: CPT

## 2025-07-30 PROCEDURE — 25010000002 DIPHENHYDRAMINE PER 50 MG: Performed by: FAMILY MEDICINE

## 2025-07-30 PROCEDURE — 94660 CPAP INITIATION&MGMT: CPT

## 2025-07-30 PROCEDURE — 92610 EVALUATE SWALLOWING FUNCTION: CPT

## 2025-07-30 PROCEDURE — 97166 OT EVAL MOD COMPLEX 45 MIN: CPT

## 2025-07-30 PROCEDURE — 25010000002 ONDANSETRON PER 1 MG: Performed by: FAMILY MEDICINE

## 2025-07-30 PROCEDURE — 93306 TTE W/DOPPLER COMPLETE: CPT

## 2025-07-30 PROCEDURE — 94761 N-INVAS EAR/PLS OXIMETRY MLT: CPT

## 2025-07-30 PROCEDURE — 82948 REAGENT STRIP/BLOOD GLUCOSE: CPT | Performed by: FAMILY MEDICINE

## 2025-07-30 PROCEDURE — 94664 DEMO&/EVAL PT USE INHALER: CPT

## 2025-07-30 RX ORDER — DEXTROSE MONOHYDRATE 25 G/50ML
25 INJECTION, SOLUTION INTRAVENOUS
Status: DISCONTINUED | OUTPATIENT
Start: 2025-07-30 | End: 2025-07-31 | Stop reason: HOSPADM

## 2025-07-30 RX ORDER — ALPRAZOLAM 0.25 MG
0.5 TABLET ORAL ONCE AS NEEDED
Status: DISCONTINUED | OUTPATIENT
Start: 2025-07-30 | End: 2025-07-31 | Stop reason: HOSPADM

## 2025-07-30 RX ORDER — IPRATROPIUM BROMIDE AND ALBUTEROL SULFATE 2.5; .5 MG/3ML; MG/3ML
3 SOLUTION RESPIRATORY (INHALATION) 2 TIMES DAILY
Status: DISCONTINUED | OUTPATIENT
Start: 2025-07-30 | End: 2025-07-31 | Stop reason: HOSPADM

## 2025-07-30 RX ORDER — PROCHLORPERAZINE EDISYLATE 5 MG/ML
5 INJECTION INTRAMUSCULAR; INTRAVENOUS EVERY 6 HOURS PRN
Status: DISCONTINUED | OUTPATIENT
Start: 2025-07-30 | End: 2025-07-30

## 2025-07-30 RX ORDER — IPRATROPIUM BROMIDE AND ALBUTEROL SULFATE 2.5; .5 MG/3ML; MG/3ML
3 SOLUTION RESPIRATORY (INHALATION) EVERY 4 HOURS PRN
Status: DISCONTINUED | OUTPATIENT
Start: 2025-07-30 | End: 2025-07-31 | Stop reason: HOSPADM

## 2025-07-30 RX ORDER — ONDANSETRON 2 MG/ML
4 INJECTION INTRAMUSCULAR; INTRAVENOUS EVERY 6 HOURS PRN
Status: DISCONTINUED | OUTPATIENT
Start: 2025-07-30 | End: 2025-07-31 | Stop reason: HOSPADM

## 2025-07-30 RX ORDER — ASPIRIN 81 MG/1
81 TABLET, CHEWABLE ORAL DAILY
Status: DISCONTINUED | OUTPATIENT
Start: 2025-07-30 | End: 2025-07-31 | Stop reason: HOSPADM

## 2025-07-30 RX ORDER — SODIUM CHLORIDE 9 MG/ML
40 INJECTION, SOLUTION INTRAVENOUS AS NEEDED
Status: DISCONTINUED | OUTPATIENT
Start: 2025-07-30 | End: 2025-07-31 | Stop reason: HOSPADM

## 2025-07-30 RX ORDER — SODIUM CHLORIDE 0.9 % (FLUSH) 0.9 %
10 SYRINGE (ML) INJECTION AS NEEDED
Status: DISCONTINUED | OUTPATIENT
Start: 2025-07-30 | End: 2025-07-31 | Stop reason: HOSPADM

## 2025-07-30 RX ORDER — IBUPROFEN 600 MG/1
1 TABLET ORAL
Status: DISCONTINUED | OUTPATIENT
Start: 2025-07-30 | End: 2025-07-31 | Stop reason: HOSPADM

## 2025-07-30 RX ORDER — ACETAMINOPHEN 325 MG/1
650 TABLET ORAL EVERY 6 HOURS PRN
Status: DISCONTINUED | OUTPATIENT
Start: 2025-07-30 | End: 2025-07-31 | Stop reason: HOSPADM

## 2025-07-30 RX ORDER — METOPROLOL SUCCINATE 50 MG/1
50 TABLET, EXTENDED RELEASE ORAL DAILY
Status: DISCONTINUED | OUTPATIENT
Start: 2025-07-30 | End: 2025-07-31 | Stop reason: HOSPADM

## 2025-07-30 RX ORDER — POLYETHYLENE GLYCOL 3350 17 G/17G
17 POWDER, FOR SOLUTION ORAL DAILY PRN
Status: DISCONTINUED | OUTPATIENT
Start: 2025-07-30 | End: 2025-07-31 | Stop reason: HOSPADM

## 2025-07-30 RX ORDER — BISACODYL 10 MG
10 SUPPOSITORY, RECTAL RECTAL DAILY PRN
Status: DISCONTINUED | OUTPATIENT
Start: 2025-07-30 | End: 2025-07-31 | Stop reason: HOSPADM

## 2025-07-30 RX ORDER — EPINEPHRINE 0.3 MG/.3ML
0.3 INJECTION SUBCUTANEOUS AS NEEDED
Status: DISCONTINUED | OUTPATIENT
Start: 2025-07-30 | End: 2025-07-31 | Stop reason: HOSPADM

## 2025-07-30 RX ORDER — DIPHENHYDRAMINE HYDROCHLORIDE 50 MG/ML
25 INJECTION, SOLUTION INTRAMUSCULAR; INTRAVENOUS EVERY 6 HOURS PRN
Status: DISCONTINUED | OUTPATIENT
Start: 2025-07-30 | End: 2025-07-31 | Stop reason: HOSPADM

## 2025-07-30 RX ORDER — ATORVASTATIN CALCIUM 40 MG/1
80 TABLET, FILM COATED ORAL NIGHTLY
Status: DISCONTINUED | OUTPATIENT
Start: 2025-07-30 | End: 2025-07-31 | Stop reason: HOSPADM

## 2025-07-30 RX ORDER — NICOTINE POLACRILEX 4 MG
15 LOZENGE BUCCAL
Status: DISCONTINUED | OUTPATIENT
Start: 2025-07-30 | End: 2025-07-31 | Stop reason: HOSPADM

## 2025-07-30 RX ORDER — BISACODYL 5 MG/1
5 TABLET, DELAYED RELEASE ORAL DAILY PRN
Status: DISCONTINUED | OUTPATIENT
Start: 2025-07-30 | End: 2025-07-31 | Stop reason: HOSPADM

## 2025-07-30 RX ORDER — AMOXICILLIN 250 MG
2 CAPSULE ORAL 2 TIMES DAILY PRN
Status: DISCONTINUED | OUTPATIENT
Start: 2025-07-30 | End: 2025-07-31 | Stop reason: HOSPADM

## 2025-07-30 RX ORDER — INSULIN LISPRO 100 [IU]/ML
2-9 INJECTION, SOLUTION INTRAVENOUS; SUBCUTANEOUS
Status: DISCONTINUED | OUTPATIENT
Start: 2025-07-30 | End: 2025-07-31 | Stop reason: HOSPADM

## 2025-07-30 RX ORDER — ESCITALOPRAM OXALATE 10 MG/1
20 TABLET ORAL DAILY
Status: DISCONTINUED | OUTPATIENT
Start: 2025-07-30 | End: 2025-07-31 | Stop reason: HOSPADM

## 2025-07-30 RX ORDER — SODIUM CHLORIDE 0.9 % (FLUSH) 0.9 %
10 SYRINGE (ML) INJECTION EVERY 12 HOURS SCHEDULED
Status: DISCONTINUED | OUTPATIENT
Start: 2025-07-30 | End: 2025-07-31 | Stop reason: HOSPADM

## 2025-07-30 RX ORDER — PANTOPRAZOLE SODIUM 40 MG/1
40 TABLET, DELAYED RELEASE ORAL DAILY
Status: DISCONTINUED | OUTPATIENT
Start: 2025-07-30 | End: 2025-07-31 | Stop reason: HOSPADM

## 2025-07-30 RX ORDER — LEVOTHYROXINE SODIUM 100 UG/1
100 TABLET ORAL
Status: DISCONTINUED | OUTPATIENT
Start: 2025-07-30 | End: 2025-07-31 | Stop reason: HOSPADM

## 2025-07-30 RX ORDER — ASPIRIN 300 MG/1
300 SUPPOSITORY RECTAL DAILY
Status: DISCONTINUED | OUTPATIENT
Start: 2025-07-30 | End: 2025-07-31 | Stop reason: HOSPADM

## 2025-07-30 RX ADMIN — LEVOTHYROXINE SODIUM 100 MCG: 0.1 TABLET ORAL at 05:24

## 2025-07-30 RX ADMIN — DIPHENHYDRAMINE HYDROCHLORIDE 25 MG: 50 INJECTION, SOLUTION INTRAMUSCULAR; INTRAVENOUS at 13:55

## 2025-07-30 RX ADMIN — Medication 10 ML: at 09:41

## 2025-07-30 RX ADMIN — PROCHLORPERAZINE EDISYLATE 5 MG: 5 INJECTION INTRAMUSCULAR; INTRAVENOUS at 11:10

## 2025-07-30 RX ADMIN — IPRATROPIUM BROMIDE AND ALBUTEROL SULFATE 3 ML: .5; 3 SOLUTION RESPIRATORY (INHALATION) at 21:30

## 2025-07-30 RX ADMIN — ACETAMINOPHEN 650 MG: 325 TABLET ORAL at 21:28

## 2025-07-30 RX ADMIN — ESCITALOPRAM OXALATE 20 MG: 10 TABLET ORAL at 11:10

## 2025-07-30 RX ADMIN — ONDANSETRON 4 MG: 2 INJECTION, SOLUTION INTRAMUSCULAR; INTRAVENOUS at 09:41

## 2025-07-30 RX ADMIN — ONDANSETRON 4 MG: 2 INJECTION, SOLUTION INTRAMUSCULAR; INTRAVENOUS at 03:23

## 2025-07-30 RX ADMIN — INSULIN LISPRO 4 UNITS: 100 INJECTION, SOLUTION INTRAVENOUS; SUBCUTANEOUS at 13:26

## 2025-07-30 RX ADMIN — PANTOPRAZOLE SODIUM 40 MG: 40 TABLET, DELAYED RELEASE ORAL at 09:41

## 2025-07-30 RX ADMIN — ASPIRIN 81 MG: 81 TABLET, CHEWABLE ORAL at 09:41

## 2025-07-30 RX ADMIN — IPRATROPIUM BROMIDE AND ALBUTEROL SULFATE 3 ML: .5; 3 SOLUTION RESPIRATORY (INHALATION) at 10:12

## 2025-07-30 RX ADMIN — DIPHENHYDRAMINE HYDROCHLORIDE 25 MG: 50 INJECTION, SOLUTION INTRAMUSCULAR; INTRAVENOUS at 23:47

## 2025-07-30 RX ADMIN — ACETAMINOPHEN 650 MG: 325 TABLET ORAL at 03:23

## 2025-07-30 RX ADMIN — Medication 10 ML: at 21:29

## 2025-07-30 NOTE — PLAN OF CARE
Goal Outcome Evaluation:  Plan of Care Reviewed With: patient, family        Progress: no change  Outcome Evaluation: NIH-0 this shift. Vision changes have improved. Given zofran and compazine for nausea. Pt developed hives and itching given benadryl. Hives have improved. VSS. Awaiting MRI, radiology verifying compatibility of shunt.

## 2025-07-30 NOTE — PLAN OF CARE
Goal Outcome Evaluation:      ASSESSMENT/ PLAN OF CARE:  Pt presents with limitations, noted below, that impede patient's ability to tolerate diet safely and independently. The skills of a therapist will be required to safely and effectively implement the following treatment plan to restore maximal level of function.    PROBLEMS:  1.  Risk of aspiration, swallow delay    TREATMENT: speech therapy for dysphagia,education of strategies and tolerance of least restrictive diet.       FREQUENCY/DURATION:  daily, 5 days a week    REHAB POTENTIAL:  Pt has good rehab potential.  The following limitations may influence improvement/ length of tx  medical status.    RECOMMENDATIONS:   1.   DIET: regular solids, thin liquids    2.  POSITION: fully upright for all po, 30 minutes following    3.  COMPENSATORY STRATEGIES: small bites and sips, controlled drink/single sips             Anticipated Discharge Disposition (SLP): anticipate therapy at next level of care

## 2025-07-30 NOTE — PROGRESS NOTES
[facility]  [facility]    Routine Consult Follow-Up    Patient Name:   Ifrah Robertson  YOB: 1960  Identification Number:   MRN - [id number]  Date of Service:   07/30/2025 16:58:56    Diagnosis        I63.89 - Cerebrovascular accident (CVA) due to other mechanism ([facility])    Impression  Patient is a 64-year-old right-handed female with prior history of stroke, diabetes, Arnold Chiari malformation, uterine cancer in remission, tobacco use. Concern for possible vascular insult. Patient pending MRI brain imaging. Continue current management. Will continue to follow.    Our recommendations are outlined below    Subjective  Family at bedside. Patient states she is tired. States vision has improved. States left eye no longer looks black, currently feels blurry. No longer has weakness. Left leg has improved. Dizziness has improved.    Hospital Course  Patient is a 64-year-old right-handed female with prior history of stroke, diabetes, Arnold Chiari malformation, uterine cancer in remission, tobacco use presenting with left leg weakness, left eye blurred vision, dizziness described as imbalance. On initial presentation Case was discussed with HARESH at [facility] who did not feel that there is clear indication for transfer or acute intervention. Patient not felt to be a thrombolytic candidate on admission. During admission did have some symptomatic improvement.    Imaging  Echo:  Interpretation Summary       Left ventricular ejection fraction appears to be 56 - 60%.   Left ventricular wall thickness is consistent with borderline concentric hypertrophy.   Left ventricular diastolic function is consistent with (grade I) impaired relaxation and age.   No significant valvular disease.    CTA head and neck: COMBINED IMPRESSION)    1. The study is ABNORMAL.    2. There is a possible emergent large vessel occlusion versus high-grade stenosis at the right MCA bifurcation  involving the origins of the superior and inferior division of the M2 segments of the right middle cerebral artery (MCA) territory. CT/CTA  suggests the possibility of an acute-to-subacute infarct within the right MCA distribution, including portions of the anterior right basal ganglia.    3. Mild-to-moderate narrowing involves the distal cavernous and supraclinoid right internal carotid artery (ICA).    4. There is mild-to-moderate luminal narrowing of the proximal left internal carotid artery, which is estimated at 49% by NASCET criteria; this finding is predominantly due to noncalcified atherosclerotic plaque. ..  5. Left vertebral artery is dominant.  6. No cerebral aneurysm.    Labs  Glucose 207, TSH 7.010, hemoglobin A1c 6.6, free T40.9       Examination  BP(147/80), Pulse(82), Temp(97.9), Resp(18),  1A: Level of Consciousness - Alert; keenly responsive + 0  1B: Ask Month and Age - Both Questions Right + 0  1C: Blink Eyes & Squeeze Hands - Performs Both Tasks + 0  2: Test Horizontal Extraocular Movements - Normal + 0  3: Test Visual Fields - No Visual Loss + 0  4: Test Facial Palsy (Use Grimace if Obtunded) - Normal symmetry + 0  5A: Test Left Arm Motor Drift - No Drift for 10 Seconds + 0  5B: Test Right Arm Motor Drift - No Drift for 10 Seconds + 0  6A: Test Left Leg Motor Drift - No Drift for 5 Seconds + 0  6B: Test Right Leg Motor Drift - No Drift for 5 Seconds + 0  7: Test Limb Ataxia (FNF/Heel-Shin) - No Ataxia + 0  8: Test Sensation - Normal; No sensory loss + 0  9: Test Language/Aphasia - Normal; No aphasia + 0  10: Test Dysarthria - Normal + 0  11: Test Extinction/Inattention - No abnormality + 0    NIHSS Score: 0          This consult was conducted in real time using interactive audio and video technology. Patient was informed of the technology being used for this visit and agreed to proceed. Patient located in hospital and provider located at home/office setting.    Telehealth Neurology  consultation was provided. I spent minutes providing telehealth care. This includes time spent for face to face visit via telemedicine, review of medical records, imaging studies and discussion of findings with providers, the patient and/or family.      Dr Jazmín Jones      [facility]  For Inpatient follow-up with [facility] physician please call [facility] at [phone]. As we are not an outpatient service for any post hospital discharge needs please contact the hospital for assistance.  If you have any questions for the [facility] physicians or need to reconsult for clinical or diagnostic changes please contact us via [facility] at [phone]

## 2025-07-30 NOTE — ED PROVIDER NOTES
Time: 9:32 PM EDT  Date of encounter:  2025  Independent Historian/Clinical History and Information was obtained by:   Patient and Family    History is limited by: N/A    Chief Complaint: Left-sided leg weakness and blurred vision and left eye      History of Present Illness:  Patient is a 64 y.o. year old female who presents to the emergency department for evaluation of left-sided leg weakness and blurred vision in the left eye which started at 7 AM today.  The patient states that there is a visual field cut in the lateral aspect of her left eye only.  She also complains of left lower extremity weakness and dizziness.  She has no other focal neurologic complaints      Patient Care Team  Primary Care Provider: Rashid Acosta DO    Past Medical History:     Allergies   Allergen Reactions    Tetracycline Hcl Unknown - High Severity     INFECTED BLOOD    Valacyclovir Hcl Hives    Latex Rash     Rash     Azithromycin Hives    Cefaclor Unknown - Low Severity    Gluten Meal Other (See Comments)    Hydrocodone-Acetaminophen Unknown - Low Severity    Indomethacin Unknown - Low Severity    Ketorolac Tromethamine Unknown - Low Severity    Statins Unknown - Low Severity    Trazodone Dizziness     Past Medical History:   Diagnosis Date    Anemia     Arnold-Chiari deformity     Diabetes mellitus     Dr. Cameron manages    Obesity     Smoker     Stroke 2018    no deficits, baby ASA only, hx of blood thinners 2018    Uterine cancer     Visual impairment      Past Surgical History:   Procedure Laterality Date    ANUS SURGERY      blood clot    BRAIN SURGERY      shunt x3, and decompression    CARPAL TUNNEL RELEASE Left      SECTION      FOOT SURGERY Right     THYROID SURGERY       Family History   Problem Relation Age of Onset    Stroke Father     Breast cancer Mother     Heart disease Mother     Cancer Mother     Uterine cancer Neg Hx     Colon cancer Neg Hx     Ovarian cancer Neg Hx        Home Medications:  Prior to  Admission medications    Medication Sig Start Date End Date Taking? Authorizing Provider   albuterol sulfate  (90 Base) MCG/ACT inhaler Inhale 2 puffs Every 4 (Four) Hours As Needed for Wheezing. 5/21/25   Rashid Acosta DO   Aspirin Low Dose 81 MG EC tablet TAKE ONE TABLET BY MOUTH ONCE DAILY  Patient taking differently: Take 1 tablet by mouth Daily. 6/15/22   Keerthi Herrera APRN   clobetasol (TEMOVATE) 0.05 % ointment Apply 1 application  topically to the appropriate area as directed 2 (Two) Times a Day. 9/18/23   Rashid Acosta DO   Combivent Respimat  MCG/ACT inhaler INHALE 1 PUFF 4 (FOUR) TIMES A DAY AS NEEDED FOR WHEEZING. 3/6/25   Rashid Acosta DO   Continuous Glucose  (FreeStyle Risa 2 [place]) device Use 1 each Daily. 7/3/25   Rashid Acosta DO   diphenhydrAMINE (BENADRYL) 50 MG capsule Take 1 capsule by mouth At Night As Needed for Sleep.    Provider, MD Chas   EPINEPHrine (EPIPEN) 0.3 MG/0.3ML solution auto-injector injection Inject 0.3 mL into the appropriate muscle as directed by prescriber 1 (One) Time. 12/12/22   ProviderChas MD   escitalopram (LEXAPRO) 20 MG tablet Take 1 tablet by mouth Daily. 5/21/25   Rashid Acosta DO   ezetimibe (ZETIA) 10 MG tablet Take 1 tablet by mouth every night at bedtime. 5/21/25   Rashid Acosta DO   fluticasone (Flonase) 50 MCG/ACT nasal spray 2 sprays into the nostril(s) as directed by provider Daily. 5/11/23   Rashid Acosta DO   metFORMIN ER (GLUCOPHAGE-XR) 500 MG 24 hr tablet TAKE 4 TABLETS EVERY MORNING 6/20/24   Rashid Acosta DO   metoprolol succinate XL (TOPROL-XL) 50 MG 24 hr tablet Take 1 tablet by mouth Daily. 5/21/25   Rashid Acosta DO   pantoprazole (PROTONIX) 40 MG EC tablet Take 1 tablet by mouth Daily. 5/21/25   Rashid Acosta DO   promethazine (PHENERGAN) 25 MG tablet Take 1 tablet by mouth Every 8 (Eight) Hours As Needed for Nausea or Vomiting. 5/21/25   Rashid Acosta,    Synthroid 100 MCG tablet Take  "1 tablet by mouth Every Morning. 6/3/25   Rashid Acosta DO   Tirzepatide (Mounjaro) 7.5 MG/0.5ML solution auto-injector Inject 0.5 mL under the skin into the appropriate area as directed 1 (One) Time Per Week. 5/21/25   Rashid Acosta DO   vitamin D (ERGOCALCIFEROL) 1.25 MG (77365 UT) capsule capsule Take 1 capsule by mouth Every 7 (Seven) Days. 5/21/25   Rashid Acosta DO        Social History:   Social History     Tobacco Use    Smoking status: Every Day     Current packs/day: 1.00     Average packs/day: 1 pack/day for 45.6 years (45.6 ttl pk-yrs)     Types: Cigarettes     Start date: 1980    Smokeless tobacco: Never   Vaping Use    Vaping status: Former    Substances: Nicotine, Flavoring   Substance Use Topics    Alcohol use: Never    Drug use: Never         Review of Systems:  Review of Systems   Constitutional:  Negative for chills and fever.   HENT:  Negative for congestion, ear pain and sore throat.    Eyes:  Positive for visual disturbance. Negative for pain.   Respiratory:  Negative for cough, chest tightness and shortness of breath.    Cardiovascular:  Negative for chest pain.   Gastrointestinal:  Negative for abdominal pain, diarrhea, nausea and vomiting.   Genitourinary:  Negative for flank pain and hematuria.   Musculoskeletal:  Negative for joint swelling.   Skin:  Negative for pallor.   Neurological:  Positive for weakness and numbness. Negative for seizures and headaches.   All other systems reviewed and are negative.       Physical Exam:  /64   Pulse 75   Temp 97.8 °F (36.6 °C) (Oral)   Resp 17   Ht 172.7 cm (68\")   Wt 125 kg (276 lb 3.8 oz)   SpO2 98%   BMI 42.00 kg/m²     Physical Exam  Vitals and nursing note reviewed.   Constitutional:       General: She is not in acute distress.     Appearance: Normal appearance. She is not toxic-appearing.   HENT:      Head: Normocephalic and atraumatic.      Mouth/Throat:      Mouth: Mucous membranes are moist.   Eyes:      General: No scleral " icterus.  Cardiovascular:      Rate and Rhythm: Normal rate and regular rhythm.      Pulses: Normal pulses.      Heart sounds: Normal heart sounds.   Pulmonary:      Effort: Pulmonary effort is normal. No respiratory distress.      Breath sounds: Normal breath sounds.   Abdominal:      General: Abdomen is flat.      Palpations: Abdomen is soft.      Tenderness: There is no abdominal tenderness.   Musculoskeletal:         General: Normal range of motion.      Cervical back: Normal range of motion and neck supple.   Skin:     General: Skin is warm and dry.      Capillary Refill: Capillary refill takes less than 2 seconds.   Neurological:      Mental Status: She is alert.                    Medical Decision Making:      Comorbidities that affect care:    Diabetes and previous stroke    External Notes reviewed:    Previous Clinic Note: PCP office visit.      The following orders were placed and all results were independently analyzed by me:  Orders Placed This Encounter   Procedures    CT Head Without Contrast Stroke Protocol    CT Angiogram Head w AI Analysis of LVO    CT Angiogram Neck    CT CEREBRAL PERFUSION WITH & WITHOUT CONTRAST    XR Chest 1 View    [place] Draw    Comprehensive Metabolic Panel    Protime-INR    aPTT    Urinalysis With Microscopic If Indicated (No Culture) - Urine, Clean Catch    CBC Auto Differential    NPO Diet NPO Type: Strict NPO    Initiate Department's Acute Stroke Process (Team D, Code 19, etc)    Perform NIH Stroke Scale    Measure Actual Weight    Head of Bed 30 Degrees or Less    Undress and Gown    Continuous Pulse Oximetry    Vital Signs    Neuro Checks    Notify Provider for SBP <80 or >200    Notify Provider for SBP >140 (For Hemorrhagic Stroke)    No Hypotonic Fluids    Nursing Dysphagia Screening (Complete Prior to Giving anything PO)    RN to Place Order SLP Consult (IF swallow screen failed) - Eval & Treat Choosing Reason of RN Dysphagia Screen Failed    Code Status and Medical  Interventions: CPR (Attempt to Resuscitate); Full Support    Hospitalist (on-call MD unless specified)    Oxygen Therapy- Nasal Cannula; Titrate 1-6 LPM Per SpO2; 90 - 95%    POC Glucose Once    POC Glucose Once    ECG 12 Lead ED Triage Standing Order; Acute Stroke (Onset <12 hrs)    Insert Large Bore Peripheral IV - Right AC Preferred    Inpatient Admission    CBC & Differential    Green Top (Gel)    Lavender Top    Gold Top - SST    Light Blue Top       Medications Given in the Emergency Department:  Medications   sodium chloride 0.9 % flush 10 mL (has no administration in time range)   iopamidol (ISOVUE-370) 76 % injection 100 mL (has no administration in time range)   ondansetron (ZOFRAN) injection 4 mg (4 mg Intravenous Given 7/29/25 2116)   iopamidol (ISOVUE-370) 76 % injection 100 mL (40 mL Intravenous Given 7/29/25 2124)   iopamidol (ISOVUE-370) 76 % injection 100 mL (100 mL Intravenous Given 7/29/25 2126)        ED Course:         EKG: Sinus rhythm rate of 74 bpm  Right bundle branch block  No acute ischemia.      Labs:    Lab Results (last 24 hours)       Procedure Component Value Units Date/Time    POC Glucose Once [[id number]]  (Abnormal) Collected: 07/29/25 2048    Specimen: Blood Updated: 07/29/25 2050     Glucose 132 mg/dL      Comment: Serial Number: [id number]:  [id number]       CBC & Differential [[id number]]  (Abnormal) Collected: 07/29/25 2100    Specimen: Blood Updated: 07/29/25 2115    Narrative:      The following orders were created for panel order CBC & Differential.  Procedure                               Abnormality         Status                     ---------                               -----------         ------                     CBC Auto Differential[[id number]]        Abnormal            Final result                 Please view results for these tests on the individual orders.    Comprehensive Metabolic Panel [[id number]]  (Abnormal) Collected: 07/29/25 2100    Specimen: Blood  Updated: 07/29/25 2159     Glucose 148 mg/dL      BUN 5.4 mg/dL      Creatinine 0.69 mg/dL      Sodium 138 mmol/L      Potassium 4.3 mmol/L      Chloride 100 mmol/L      CO2 22.9 mmol/L      Calcium 8.2 mg/dL      Total Protein 7.1 g/dL      Albumin 4.4 g/dL      ALT (SGPT) 11 U/L      AST (SGOT) 11 U/L      Alkaline Phosphatase 58 U/L      Total Bilirubin 0.5 mg/dL      Globulin 2.7 gm/dL      A/G Ratio 1.6 g/dL      BUN/Creatinine Ratio 7.8     Anion Gap 15.1 mmol/L      eGFR 97.1 mL/min/1.73     Narrative:      GFR Categories in Chronic Kidney Disease (CKD)              GFR Category          GFR (mL/min/1.73)    Interpretation  G1                    90 or greater        Normal or high (1)  G2                    60-89                Mild decrease (1)  G3a                   45-59                Mild to moderate decrease  G3b                   30-44                Moderate to severe decrease  G4                    15-29                Severe decrease  G5                    14 or less           Kidney failure    (1)In the absence of evidence of kidney disease, neither GFR category G1 or G2 fulfill the criteria for CKD.    eGFR calculation 2021 CKD-EPI creatinine equation, which does not include race as a factor    Protime-INR [[id number]]  (Normal) Collected: 07/29/25 2100    Specimen: Blood Updated: 07/29/25 2128     Protime 12.6 Seconds      INR 0.91    Narrative:      Suggested Therapeutic Ranges For Oral Anticoagulant Therapy:  Level of Therapy                      INR Target Range  Standard Dose                            2.0-3.0  High Dose                                2.5-3.5  Patients not receiving anticoagulant  Therapy Normal Range                     0.86-1.15    aPTT [[id number]]  (Abnormal) Collected: 07/29/25 2100    Specimen: Blood Updated: 07/29/25 2128     PTT 22.8 seconds     CBC Auto Differential [[id number]]  (Abnormal) Collected: 07/29/25 2100    Specimen: Blood Updated: 07/29/25 2115     WBC 13.46  10*3/mm3      RBC 5.51 10*6/mm3      Hemoglobin 15.5 g/dL      Hematocrit 47.4 %      MCV 86.0 fL      MCH 28.1 pg      MCHC 32.7 g/dL      RDW 13.9 %      RDW-SD 44.0 fl      MPV 10.3 fL      Platelets 315 10*3/mm3      Neutrophil % 72.0 %      Lymphocyte % 19.1 %      Monocyte % 5.3 %      Eosinophil % 1.9 %      Basophil % 1.0 %      Immature Grans % 0.7 %      Neutrophils, Absolute 9.70 10*3/mm3      Lymphocytes, Absolute 2.57 10*3/mm3      Monocytes, Absolute 0.71 10*3/mm3      Eosinophils, Absolute 0.26 10*3/mm3      Basophils, Absolute 0.13 10*3/mm3      Immature Grans, Absolute 0.09 10*3/mm3      nRBC 0.0 /100 WBC              Imaging:    CT Angiogram Head w AI Analysis of LVO  Result Date: 7/29/2025  CT ANGIOGRAM NECK, CT ANGIOGRAM HEAD W AI ANALYSIS OF LVO- (COMBINED REPORT) Date of exam: 7/29/2025 9:11 PM EDT. Comparisons: 7/29/2025. INDICATIONS: 64-year-old female w/ possible acute ischemic stroke (AIS); the patient p/w left leg weakness, blurred vision (involving the left eye); dizziness, & vestibular disturbance; prior h/o endometrial carcinoma & Chiari I malformation/deformity (treated surgically); the patient also has a history of a right-sided ventriculoperitoneal () shunt. RAPID: CTA imaging was analyzed using RAPID AI to enable computer assisted triage notification to rapidly detect a large vessel occlusion (LVO) and shorten notification time. TECHNIQUE: CTA exams of the head and neck were performed before and after the uneventful intravenous administration of an unspecified amount of Isovue-370 intravenous contrast agent (100 mL). Please see the electronic medical record (EMR) for the recorded administered intravenous contrast agent type/dose and the radiation dose. Reconstructed 2-D coronal and sagittal images were also obtained. In addition, a 3-D volume rendered image was created for interpretation. Automated exposure control and iterative reconstruction methods were used. Additionally, the  radiation dose reduction device was turned on for each scan per the ALARA (As Low as Reasonably Achievable) protocol. There is venous contamination of the study, especially obscuring portions of the right subclavian artery. The studies are limited by motion artifact. FINDINGS: CTA HEAD: There is asymmetry in the distal runoff of the right middle cerebral artery (MCA) territory relative to the left. There is also the suggestion of high-grade stenosis or possible acute (to subacute) occlusion at the expected M1 segment of the right middle  cerebral artery (MCA) bifurcation, involving the superior and, probably to a lesser extent, inferior divisions of the right M2 segment. Again, these findings may represent an acute-to-subacute occlusion. There is possible asymmetry within the insular cortex density on the right versus that seen contralaterally (that is, it is hypodense/hypoenhancing). Also, hypodensities are present within the anterior right gangliocapsular regions. Again, these findings support an acute-to-subacute infarct(s) within  the right middle cerebral artery (MCA) territory. Brain MRI examination follow-up may be helpful in further imaging assessment if clinically warranted. Additionally, these findings may be amenable to endovascular treatment if clinically indicated. Consider neurointerventional/vascular interventional neurosurgical consultation for further assessment, management, and treatment options. Also, there is also a long-segment luminal stenosis suggested within the distal right cavernous and right supraclinoid internal carotid artery (which is probably mild-to-moderate in degree). Otherwise, the vertebrobasilar arterial system is patent. The left vertebral artery is dominant. The right posterior communicating artery is patent. The left posterior communicating artery is not well seen. It may be hypoplastic or absent. The A1 segment on the right is not clearly identified. It may be hypoplastic or  absent or even occluded (which is least likely). The intracranial left carotid system is patent without hemodynamically significant stenosis or emergent large vessel occlusion. The dural venous sinuses are grossly patent. Please see the nonenhanced head CT exam report from earlier during the same day for further detail regarding intracranial findings. CTA NECK: There is mild-to-moderate luminal narrowing of the proximal left internal carotid artery, which is estimated at 49% by NASCET criteria; this finding is predominantly due to noncalcified atherosclerotic plaque and is seen on image 11 of series 2001 and adjacent images. Otherwise, no hemodynamically significant arterial stenoses are appreciated. The left vertebral artery is dominant. No acute arterial dissection is suggested. OTHER FINDINGS: Degenerative changes involve the cervical spine, especially at C5-6 and C6-7. Again, there is a right-sided ventriculoperitoneal () shunt. The proximal limb is in place from a right parietal approach. Its distal tip crosses midline and resides in the subcortical left frontal lobe (such as seen on image 90 of series 5 and adjacent images). The patient has undergone midline suboccipital craniectomy for treatment of Chiari I malformation/deformity with an associated prominent posterior extra-axial space, more so on the left than the right. This finding is thought less likely to represent a pseudomeningocele. Again, please see the nonenhanced head CT exam report from earlier during the same day for further detail regarding additional intracranial findings.     (COMBINED IMPRESSION) 1. The study is ABNORMAL. 2. There is a possible emergent large vessel occlusion versus high-grade stenosis at the right MCA bifurcation involving the origins of the superior and inferior division of the M2 segments of the right middle cerebral artery (MCA) territory. CT/CTA suggests the possibility of an acute-to-subacute infarct within the right  MCA distribution, including portions of the anterior right basal ganglia. 3. Mild-to-moderate narrowing involves the distal cavernous and supraclinoid right internal carotid artery (ICA). 4. There is mild-to-moderate luminal narrowing of the proximal left internal carotid artery, which is estimated at 49% by NASCET criteria; this finding is predominantly due to noncalcified atherosclerotic plaque. 5. Otherwise, no definite hemodynamically significant arterial stenoses are seen. 6. The left vertebral artery is dominant. 7. No cerebral aneurysm. 8. Please see above comments for further detail. CRITICAL RESULT: The critical finding(s), as under number 2 of the impression, was (were) discussed with Dr. Tee (ordering/attending [facility] ED Physician) by phone at approximately 10:33 p.m. on 7/29/2025. Portions of this note were completed with a voice recognition program. Electronically Signed: Balaji Wang MD  7/29/2025 10:49 PM EDT  Workstation ID: [id number]    CT Angiogram Neck  Result Date: 7/29/2025  CT ANGIOGRAM NECK, CT ANGIOGRAM HEAD W AI ANALYSIS OF LVO- (COMBINED REPORT) Date of exam: 7/29/2025 9:11 PM EDT. Comparisons: 7/29/2025. INDICATIONS: 64-year-old female w/ possible acute ischemic stroke (AIS); the patient p/w left leg weakness, blurred vision (involving the left eye); dizziness, & vestibular disturbance; prior h/o endometrial carcinoma & Chiari I malformation/deformity (treated surgically); the patient also has a history of a right-sided ventriculoperitoneal () shunt. RAPID: CTA imaging was analyzed using RAPID AI to enable computer assisted triage notification to rapidly detect a large vessel occlusion (LVO) and shorten notification time. TECHNIQUE: CTA exams of the head and neck were performed before and after the uneventful intravenous administration of an unspecified amount of Isovue-370 intravenous contrast agent (100 mL). Please see the electronic medical record (EMR) for the recorded  administered intravenous contrast agent type/dose and the radiation dose. Reconstructed 2-D coronal and sagittal images were also obtained. In addition, a 3-D volume rendered image was created for interpretation. Automated exposure control and iterative reconstruction methods were used. Additionally, the radiation dose reduction device was turned on for each scan per the ALARA (As Low as Reasonably Achievable) protocol. There is venous contamination of the study, especially obscuring portions of the right subclavian artery. The studies are limited by motion artifact. FINDINGS: CTA HEAD: There is asymmetry in the distal runoff of the right middle cerebral artery (MCA) territory relative to the left. There is also the suggestion of high-grade stenosis or possible acute (to subacute) occlusion at the expected M1 segment of the right middle  cerebral artery (MCA) bifurcation, involving the superior and, probably to a lesser extent, inferior divisions of the right M2 segment. Again, these findings may represent an acute-to-subacute occlusion. There is possible asymmetry within the insular cortex density on the right versus that seen contralaterally (that is, it is hypodense/hypoenhancing). Also, hypodensities are present within the anterior right gangliocapsular regions. Again, these findings support an acute-to-subacute infarct(s) within  the right middle cerebral artery (MCA) territory. Brain MRI examination follow-up may be helpful in further imaging assessment if clinically warranted. Additionally, these findings may be amenable to endovascular treatment if clinically indicated. Consider neurointerventional/vascular interventional neurosurgical consultation for further assessment, management, and treatment options. Also, there is also a long-segment luminal stenosis suggested within the distal right cavernous and right supraclinoid internal carotid artery (which is probably mild-to-moderate in degree). Otherwise, the  vertebrobasilar arterial system is patent. The left vertebral artery is dominant. The right posterior communicating artery is patent. The left posterior communicating artery is not well seen. It may be hypoplastic or absent. The A1 segment on the right is not clearly identified. It may be hypoplastic or absent or even occluded (which is least likely). The intracranial left carotid system is patent without hemodynamically significant stenosis or emergent large vessel occlusion. The dural venous sinuses are grossly patent. Please see the nonenhanced head CT exam report from earlier during the same day for further detail regarding intracranial findings. CTA NECK: There is mild-to-moderate luminal narrowing of the proximal left internal carotid artery, which is estimated at 49% by NASCET criteria; this finding is predominantly due to noncalcified atherosclerotic plaque and is seen on image 11 of series 2001 and adjacent images. Otherwise, no hemodynamically significant arterial stenoses are appreciated. The left vertebral artery is dominant. No acute arterial dissection is suggested. OTHER FINDINGS: Degenerative changes involve the cervical spine, especially at C5-6 and C6-7. Again, there is a right-sided ventriculoperitoneal () shunt. The proximal limb is in place from a right parietal approach. Its distal tip crosses midline and resides in the subcortical left frontal lobe (such as seen on image 90 of series 5 and adjacent images). The patient has undergone midline suboccipital craniectomy for treatment of Chiari I malformation/deformity with an associated prominent posterior extra-axial space, more so on the left than the right. This finding is thought less likely to represent a pseudomeningocele. Again, please see the nonenhanced head CT exam report from earlier during the same day for further detail regarding additional intracranial findings.     (COMBINED IMPRESSION) 1. The study is ABNORMAL. 2. There is a  possible emergent large vessel occlusion versus high-grade stenosis at the right MCA bifurcation involving the origins of the superior and inferior division of the M2 segments of the right middle cerebral artery (MCA) territory. CT/CTA suggests the possibility of an acute-to-subacute infarct within the right MCA distribution, including portions of the anterior right basal ganglia. 3. Mild-to-moderate narrowing involves the distal cavernous and supraclinoid right internal carotid artery (ICA). 4. There is mild-to-moderate luminal narrowing of the proximal left internal carotid artery, which is estimated at 49% by NASCET criteria; this finding is predominantly due to noncalcified atherosclerotic plaque. 5. Otherwise, no definite hemodynamically significant arterial stenoses are seen. 6. The left vertebral artery is dominant. 7. No cerebral aneurysm. 8. Please see above comments for further detail. CRITICAL RESULT: The critical finding(s), as under number 2 of the impression, was (were) discussed with Dr. Tee (ordering/attending [facility] ED Physician) by phone at approximately 10:33 p.m. on 7/29/2025. Portions of this note were completed with a voice recognition program. Electronically Signed: Balaji Wang MD  7/29/2025 10:49 PM EDT  Workstation ID: [id number]    XR Chest 1 View  Result Date: 7/29/2025  XR CHEST 1 VW Date of Exam: 7/29/2025 9:41 PM EDT Indication: Acute Stroke Protocol (Onset < 12 hrs) Comparison: 5/20/2023 Findings: As on prior studies, there appears to be a ventriculoperitoneal shunt catheter, crossing from the right neck across the midline, to the left upper quadrant. No shunt catheter interruption is apparent. The heart mediastinum and pulmonary vasculature appear within normal limits. Lungs appear well expanded and grossly clear. No effusion or pneumothorax is seen.     Impression: No evidence of active chest disease. Electronically Signed: Clifford Gallo MD  7/29/2025 10:04 PM EDT  Workstation ID:  [id number]    CT CEREBRAL PERFUSION WITH & WITHOUT CONTRAST  Result Date: 7/29/2025  CT CEREBRAL PERFUSION W WO CONTRAST Date of exam: 7/29/2025 9:08 PM EDT. Comparisons: 7/29/2025. INDICATIONS: 64-year-old female w/ possible acute ischemic stroke (AIS); h/o dizziness; blurred vision; visual disturbance; also, h/o endometrial carcinoma & Chiari 1 malformation/deformity (treated surgically); h/o right  shunt. TECHNIQUE: Axial CT images of the brain were obtained prior to and after the uneventful intravenous administration of iodinated contrast (with the amount recorded in the electronic medical record, EMR). Core blood volume, core blood flow, mean transit time, and Tmax images were obtained utilizing the Rapid software protocol. A limited CT angiogram of the head was also performed to measure the blood vessel density. The radiation dose reduction device was turned on for each scan per the ALARA (As Low as Reasonably Achievable) protocol. More specifically, CT perfusion (CTP) was performed, utilizing a total of (again) 40 mL of nonionic intravenous contrast agent with an injection rate of 5 mL/s. A total of 8 cm of brain coverage (that is, 12 to 15 images were acquired in a single 8 cm slab at 9 mm thickness) was used for the CTP study. The images were, again, processed using RapidQuick TV software (by [employer] [place], [place]). The CTP maps, including the cerebral blood volume (CBV), cerebral blood flow (CBF), mean transit time (MTT), time to maximum (Tmax), and time to peak (TTP), were reviewed as well as the summary map(s). The examination appears to be technically adequate. There arterial input function (AIF) and the venous output function (VOF) appear to be appropriately placed. However, motion artifact obscures detail on the exam. FINDINGS: TOTAL HYPOPERFUSION: Using a threshold of Tmax greater than 6 seconds, several scattered bilateral supratentorial and infratentorial areas of cerebral hypoperfusion are  suggested, which are thought most likely to be artifactual, considering the degree of  motion artifact. CORE INFARCT: Using the threshold of CBF less than 30 percent, there is no convincing CTP evidence for an acute ischemic core (infarct). PENUMBRA: No penumbra (or tissue at risk, TAR) is appreciated. OTHER: Please see the nonenhanced head CT exam report from earlier on the same day for further detail regarding additional intracranial findings.     No core infarct is suggested using established thresholds. The cerebral hypoperfusion portion of the study is thought to be nondiagnostic due to motion artifact. CBF < 30% (core infarct volume or vol.): 0 mL. Tmax > 6 sec (hypoperfusion vol.): 63 mL. Mismatch difference (or mismatch vol. or penumbra): 63 mL. Mismatch ratio (hypoperfusion/core infarct): Undefined. Portions of this note were completed with a voice recognition program. Electronically Signed: Balaji Wang MD  7/29/2025 9:51 PM EDT  Workstation ID: [id number]    CT Head Without Contrast Stroke Protocol  Result Date: 7/29/2025  CT HEAD WO CONTRAST STROKE PROTOCOL Date of Exam: 7/29/2025 8:55 PM EDT Indication: Neuro Deficit, acute, Stroke suspected Neuro deficit, acute, stroke suspected. Comparison: Most recent comparison study is a head CT scan from 7/24/2018 Technique: Axial CT images were obtained of the head without contrast administration.  Reconstructed coronal and sagittal images were also obtained. Automated exposure control and iterative construction methods were used. Scan Time: 2055 Study was reviewed dictated and signed at 9:05 p.m., 7/29/2025 Findings: The most superior images of the brain at the skull vertex show linear dense artifact from patient movement, most clearly visible on coronal images where artifact extends into the subcutaneous tissues and causes ghosting of the superior calvarium. Previous occipital craniectomy, and right-sided shunt are again noted, ventricular shunt catheter tip  passing just beyond the left frontal horn. Ventricles remain small, resembling slit ventricle syndrome, but stable over multiple prior exams back to 2018. New from 2018 is enlargement of the frontal CSF spaces, which may just represent progression of generalized cerebral atrophy and associated expansion of the CSF space. On both right and left, extra-axial fluid density appears slightly denser than typical  CSF. This could reflect subdural hygroma formation or possibly even residual of old subdural hemorrhage. There is no evidence of either acute or old blood products. There is no evidence of hemorrhage, edema, or acute infarct. Since 2018, 1 cm right basal ganglia infarct has developed, but now clearly old by CT appearance. Small old infarct of left central frontal white matter appears similar to prior exam.     Impression: 1. Left suboccipital craniectomy as on prior study. Right parietal approach ventricular shunt catheter, in stable position since 2018. Small diameter lateral ventricles unchanged. 2. 1 cm right basal ganglia infarct, new since 2018 but old by CT criteria. 3. Increased prominence of the frontal CSF spaces since 2018 which may represent expansion of the CSF due to interval brain atrophy. Differential would include small subdural hygromas, or even old subdural hemorrhages. No evidence, however, of acute or recent blood products here or elsewhere. No evidence of acute intracranial disease. Electronically Signed: Clifford Gallo MD  7/29/2025 9:16 PM EDT  Workstation ID: [id number]        Differential Diagnosis and Discussion:    Stroke: Differential diagnosis in this patient with signs of possible ischemic stroke include TIA or ischemic stroke, hemorrhagic stroke, hypoglycemic episode, toxic or metabolic encephalopathy, paresthesias.    PROCEDURES:    Labs were collected in the emergency department and all labs were reviewed and interpreted by me.  X-ray were performed in the emergency department and all  X-ray impressions were independently interpreted by me.  An EKG was performed and the EKG was interpreted by me.  CT scan was performed in the emergency department and the CT scan radiology impression was interpreted by me.    ECG 12 Lead ED Triage Standing Order; Acute Stroke (Onset <12 hrs)   Preliminary Result   HEART RATE=74  bpm   RR Surkcamc=041  ms   NC Fykqqnzm=290  ms   P Horizontal Axis=27  deg   P Front Axis=43  deg   QRSD Oxoktmhb=097  ms   QT Ohwecowg=025  ms   CXiM=884  ms   QRS Axis=-74  deg   T Wave Axis=22  deg   - ABNORMAL ECG -   Sinus rhythm   RBBB and LAFB   Inferior infarct, old   When compared with ECG of 11-Dec-2022 02:47:29,   Significant rate decrease   Date and Time of Study:2025-07-29 21:45:19          Procedures    MDM     Amount and/or Complexity of Data Reviewed  Clinical lab tests: reviewed  Tests in the radiology section of CPT®: reviewed  Tests in the medicine section of CPT®: reviewed             Total Critical Care time of 45 minutes. Total critical care time documented does not include time spent on separately billed procedures for services of nurses or physician assistants. I personally saw and examined the patient. I have reviewed all diagnostic interpretations and treatment plans as written. I was present for the key portions of any procedures performed and the inclusive time noted in any critical care statement. Critical care time includes patient management by me, time spent at the patients bedside,  time to review lab and imaging results, discussing patient care, documentation in the medical record, and time spent with family or caregiver.          Patient Care Considerations:    MRI: I considered ordering an MRI however this can be ordered as an inpatient      Consultants/Shared Management Plan:    Hospitalist: I have discussed the case with hospitalist who agrees to accept the patient for admission.  Consultant: I have discussed the case with the neurologist who states  the patient should be admitted for further evaluation and MRI    Social Determinants of Health:    Patient is unable to carry out activities of daily life. Escalation of care is necessary.       Disposition and Care Coordination:    Admit:   Through independent evaluation of the patient's history, physical, and imperical data, the patient meets criteria for inpatient admission to the hospital.        Final diagnoses:   Cerebrovascular accident (CVA), unspecified mechanism        ED Disposition       ED Disposition   Decision to Admit    Condition   --    Comment   Level of Care: Telemetry [5]   Diagnosis: CVA (cerebrovascular accident) [[id number]]   Admitting Physician: REESE KWONG [[id number]]   Attending Physician: REESE KWONG [[id number]]   Certification: I Certify That Inpatient Hospital Services Are Medically Necessary For Greater Than 2 Midnights                 This medical record created using voice recognition software.             Ge Tee, DO  07/30/25 0216

## 2025-07-30 NOTE — THERAPY EVALUATION
Patient Name: Ifrah Robertson  : 1960    MRN: [id number]                              Today's Date: 2025       Admit Date: 2025    Visit Dx:     ICD-10-CM ICD-9-CM   1. Cerebrovascular accident (CVA), unspecified mechanism  I63.9 434.91   2. Decreased activities of daily living (ADL)  Z78.9 V49.89     Patient Active Problem List   Diagnosis    Type 2 diabetes mellitus with diabetic neuropathy, with long-term current use of insulin    Esophageal reflux    High cholesterol    Hypothyroidism    Primary insomnia    Iron deficiency anemia secondary to inadequate dietary iron intake    Depression    Lichen sclerosus et atrophicus of the vulva    Nicotine addiction    HAILEY (obstructive sleep apnea)    Stroke    Generalized osteoarthritis of multiple sites    Hemorrhoids    Leukocytosis    Vitamin D deficiency disease    Hypocalcemia    Abnormal uterine bleeding    Encounter for subsequent annual wellness visit (AWV) in Medicare patient    EIN (endometrial intraepithelial neoplasia)    Endometrial intraepithelial neoplasia (EIN)    Allergic rhinitis    Bronchitis, mucopurulent recurrent    Class 3 severe obesity due to excess calories with serious comorbidity and body mass index (BMI) of 45.0 to 49.9 in adult    Hypertension due to endocrine disorder    Anaphylaxis, initial encounter    Tooth abscess    Biliary colic    Iron deficiency    Umbilical hernia without obstruction and without gangrene    CVA (cerebrovascular accident)    HTN (hypertension)     Past Medical History:   Diagnosis Date    Anemia     Arnold-Chiari deformity     Diabetes mellitus     Dr. Cameron manages    Obesity     Smoker     Stroke 2018    no deficits, baby ASA only, hx of blood thinners 2018    Uterine cancer     Visual impairment      Past Surgical History:   Procedure Laterality Date    ANUS SURGERY      blood clot    BRAIN SURGERY      shunt x3, and decompression    CARPAL TUNNEL RELEASE Left      SECTION      FOOT  SURGERY Right     THYROID SURGERY        General Information       Row Name 07/30/25 1017          OT Time and Intention    Subjective Information complains of;dizziness;nausea/vomiting  -EG     Document Type evaluation  -EG     Mode of Treatment individual therapy;occupational therapy  -EG     Symptoms Noted During/After Treatment dizziness;nausea  -EG       Row Name 07/30/25 1017          General Information    Patient Profile Reviewed yes  Patient reports ind. at home, no AD for mobility; tub shower with no seat; Able to walk in grocery store; sister drives her; no home O2  -EG     Prior Level of Function independent:;ADL's;all household mobility;gait;transfer  -EG     Existing Precautions/Restrictions fall  -EG     Barriers to Rehab none identified  -EG       Row Name 07/30/25 1017          Occupational Profile    Reason for Services/Referral (Occupational Profile) Patient is a 64-year-old female admitted to [facility].  OT was consulted due to possible CVA.  OT to assess for new onset of deficits and limitations in ADL/transfer performance.  No previous OT services for current condition.  -EG       Row Name 07/30/25 1017          Living Environment    Current Living Arrangements apartment  -EG     People in Home sibling(s)  -EG       Row Name 07/30/25 1017          Home Main Entrance    Number of Stairs, Main Entrance one  -EG     Stair Railings, Main Entrance none  -EG       Row Name 07/30/25 1017          Stairs Within Home, Primary    Number of Stairs, Within Home, Primary none  -EG       Row Name 07/30/25 1017          Cognition    Orientation Status (Cognition) oriented x 3  -EG       Row Name 07/30/25 1017          Safety Issues/Impairments Affecting Functional Mobility    Safety Issues Affecting Function (Mobility) insight into deficits/self-awareness  -EG     Impairments Affecting Function (Mobility) balance;shortness of breath;strength;endurance/activity tolerance  -EG                User Key  (r) = Recorded By, (t) = Taken By, (c) = Cosigned By      Initials Name Provider Type    EG Farrah Garcia, OT Occupational Therapist                     Mobility/ADL's       Row Name 07/30/25 1021          Bed Mobility    Bed Mobility bed mobility (all) activities  -EG     All Activities, [place] (Bed Mobility) standby assist  -EG     Assistive Device (Bed Mobility) bed rails  -EG       Row Name 07/30/25 1021          Transfers    Transfers bed-chair transfer;sit-stand transfer;stand-sit transfer  -EG     Comment, (Transfers) does not use an AD at home; reports she would like one and that it would make her feel more comfortable  -EG       Row Name 07/30/25 1021          Bed-Chair Transfer    Bed-Chair [place] (Transfers) contact guard;verbal cues  -EG       Row Name 07/30/25 1021          Sit-Stand Transfer    Sit-Stand [place] (Transfers) contact guard;verbal cues  -EG       Row Name 07/30/25 1021          Stand-Sit Transfer    Stand-Sit [place] (Transfers) contact guard;verbal cues  -EG       Row Name 07/30/25 1021          Functional Mobility    Functional Mobility- Ind. Level contact guard assist;minimum assist (75% patient effort);verbal cues required  -EG     Functional Mobility- Comment Patient reports increased dizziness and need for hand held assist to perform mobility safely to doorway and back before needing a break due to fatigue and nausea  -EG       Row Name 07/30/25 1021          Activities of Daily Living    BADL Assessment/Intervention bathing;upper body dressing;lower body dressing;grooming;feeding;toileting  -EG       [facility] Name 07/30/25 1021          Bathing Assessment/Intervention    [place] Level (Bathing) bathing skills;upper body;lower body;minimum assist (75% patient effort);contact guard assist  -EG       Row Name 07/30/25 1021          Upper Body Dressing Assessment/Training    [place] Level (Upper Body Dressing) upper body dressing skills;set  up  -EG       Row Name 07/30/25 1021          Lower Body Dressing Assessment/Training    [place] Level (Lower Body Dressing) lower body dressing skills;contact guard assist;don;socks  -EG     Position (Lower Body Dressing) edge of bed sitting  -EG     Comment, (Lower Body Dressing) good use of bed to compensate and support to don socks  -EG       Row Name 07/30/25 1021          Grooming Assessment/Training    [place] Level (Grooming) grooming skills;set up  -EG       Row Name 07/30/25 1021          Self-Feeding Assessment/Training    [place] Level (Feeding) feeding skills;set up  -EG       Row Name 07/30/25 1021          Toileting Assessment/Training    [place] Level (Toileting) toileting skills;perform perineal hygiene;adjust/manage clothing;contact guard assist  -EG               User Key  (r) = Recorded By, (t) = Taken By, (c) = Cosigned By      Initials Name Provider Type    EG Farrah Garcia OT Occupational Therapist                   Obj/Interventions       Row Name 07/30/25 1025          Sensory Assessment (Somatosensory)    Sensory Assessment (Somatosensory) UE sensation intact  -EG       Row Name 07/30/25 1025          Vision Assessment/Intervention    Visual Impairment/Limitations blurry vision  L eye; upon assessment able to properly visually track etc.  -EG       Row Name 07/30/25 1025          Range of Motion Comprehensive    General Range of Motion bilateral upper extremity ROM WNL  -EG       Row Name 07/30/25 1025          Strength Comprehensive (MMT)    Comment, General Manual Muscle Testing (MMT) Assessment 4-/5 grossly all major joints  -EG       Row Name 07/30/25 1025          Motor Skills    Motor Skills coordination;functional endurance  -EG     Coordination WFL  -EG     Functional Endurance fair- on room air  -EG       Row Name 07/30/25 1025          Balance    Balance Assessment sit to stand dynamic balance;standing static balance  -EG     Sit to Stand Dynamic Balance  contact guard  -EG     Static Standing Balance contact guard  -EG               User Key  (r) = Recorded By, (t) = Taken By, (c) = Cosigned By      Initials Name Provider Type    EG Farrah Garcia, OT Occupational Therapist                   Goals/Plan       Row Name 07/30/25 1027          Bed Mobility Goal 1 (OT)    Activity/Assistive Device (Bed Mobility Goal 1, OT) bed mobility activities, all  -EG     [place] Level/Cues Needed (Bed Mobility Goal 1, OT) modified independence  -EG     Time Frame (Bed Mobility Goal 1, OT) long term goal (LTG);10 days  -EG       Row Name 07/30/25 1027          Transfer Goal 1 (OT)    Activity/Assistive Device (Transfer Goal 1, OT) transfers, all  -EG     [place] Level/Cues Needed (Transfer Goal 1, OT) modified independence  -EG     Time Frame (Transfer Goal 1, OT) long term goal (LTG);10 days  -EG       Row Name 07/30/25 1027          Bathing Goal 1 (OT)    Activity/Device (Bathing Goal 1, OT) bathing skills, all  -EG     [place] Level/Cues Needed (Bathing Goal 1, OT) modified independence  -EG     Time Frame (Bathing Goal 1, OT) long term goal (LTG);10 days  -EG       Row Name 07/30/25 1027          Dressing Goal 1 (OT)    Activity/Device (Dressing Goal 1, OT) dressing skills, all  -EG     [place]/Cues Needed (Dressing Goal 1, OT) modified independence  -EG     Time Frame (Dressing Goal 1, OT) long term goal (LTG);10 days  -EG       Row Name 07/30/25 1027          Toileting Goal 1 (OT)    Activity/Device (Toileting Goal 1, OT) toileting skills, all  -EG     [place] Level/Cues Needed (Toileting Goal 1, OT) modified independence  -EG     Time Frame (Toileting Goal 1, OT) long term goal (LTG);10 days  -EG       Row Name 07/30/25 1027          Strength Goal 1 (OT)    Strength Goal 1 (OT) Patient will improve BUE strength grossly to 4/5 to promote ADL ind. and safety  -EG     Time Frame (Strength Goal 1, OT) long term goal (LTG);10 days  -EG       Row Name  07/30/25 1027          Problem Specific Goal 1 (OT)    Problem Specific Goal 1 (OT) Patient will improve OOB activity tolerance to fair to return home at [facility].  -EG     Time Frame (Problem Specific Goal 1, OT) long term goal (LTG);10 days  -EG       Row Name 07/30/25 1027          Therapy Assessment/Plan (OT)    Planned Therapy Interventions (OT) activity tolerance training;functional balance retraining;occupation/activity based interventions;adaptive equipment training;BADL retraining;neuromuscular control/coordination retraining;patient/caregiver education/training;transfer/mobility retraining;strengthening exercise  -EG               User Key  (r) = Recorded By, (t) = Taken By, (c) = Cosigned By      Initials Name Provider Type    EG Farrah Garcia, OT Occupational Therapist                   Clinical Impression       Row Name 07/30/25 1026          Pain Assessment    Pretreatment Pain Rating 0/10 - no pain  -EG     Posttreatment Pain Rating 0/10 - no pain  -EG       Row Name 07/30/25 1026          Plan of Care Review    Plan of Care Reviewed With patient  -EG     Progress no change  -EG     Outcome Evaluation Patient presents with limitations of decreased functional strength, balance, endurance and limited safety/insight ongoing deficits with reports of left-sided blurry vision requiring need for skilled OT services to facilitate return to prior level of function with ADLs.  -EG       Row Name 07/30/25 1026          Therapy Assessment/Plan (OT)    Rehab Potential (OT) good  -EG     Criteria for Skilled Therapeutic Interventions Met (OT) yes;meets criteria;skilled treatment is necessary  -EG     Therapy Frequency (OT) 5 times/wk  -EG       Row Name 07/30/25 1026          Therapy Plan Review/Discharge Plan (OT)    Equipment Needs Upon Discharge (OT) --  Rolling walker; could benefit from shower seat/bench  -EG     Anticipated Discharge Disposition (OT) skilled nursing facility  -EG       Row Name 07/30/25 1026           Positioning and Restraints    Pre-Treatment Position in bed  -EG     Post Treatment Position bed  -EG     In Bed call light within reach;supine;encouraged to call for assist;exit alarm on  -EG               User Key  (r) = Recorded By, (t) = Taken By, (c) = Cosigned By      Initials Name Provider Type    Farrah Rogers OT Occupational Therapist                   Outcome Measures       Row Name 07/30/25 1028          How much help from another is currently needed...    Putting on and taking off regular lower body clothing? 3  -EG     Bathing (including washing, rinsing, and drying) 3  -EG     Toileting (which includes using toilet bed pan or urinal) 3  -EG     Putting on and taking off regular upper body clothing 4  -EG     Taking care of personal grooming (such as brushing teeth) 4  -EG     Eating meals 4  -EG     AM-PAC 6 Clicks Score (OT) 21  -EG       Row Name 07/30/25 0258          How much help from another person do you currently need...    Turning from your back to your side while in flat bed without using bedrails? 4  -DR     Moving from lying on back to sitting on the side of a flat bed without bedrails? 4  -DR     Moving to and from a bed to a chair (including a wheelchair)? 4  -DR     Standing up from a chair using your arms (e.g., wheelchair, bedside chair)? 4  -DR     Climbing 3-5 steps with a railing? 3  -DR     To walk in hospital room? 4  -DR     AM-PAC 6 Clicks Score (PT) 23  -DR       Row Name 07/30/25 1028          Functional Assessment    Outcome Measure Options AM-PAC 6 Clicks Daily Activity (OT);Optimal Instrument  -EG       Row Name 07/30/25 1028          Optimal Instrument    Optimal Instrument Optimal - 3  -EG     Bending/Stooping 2  -EG     Standing 2  -EG     Reaching 1  -EG     From the list, choose the 3 activities you would most like to be able to do without any difficulty Standing;Reaching;Bending/stooping  -EG     Total Score Optimal - 3 5  -EG               User Key   (r) = Recorded By, (t) = Taken By, (c) = Cosigned By      Initials Name Provider Type    EG Farrah Garcia OT Occupational Therapist    Jackelin Yanes, RN Registered Nurse                    Occupational Therapy Education       Title: PT OT SLP Therapies (Done)       Topic: Occupational Therapy (Done)       Point: ADL training (Done)       Learning Progress Summary            Patient CONOR Simeon VU by EG at 7/30/2025 1031    Comment: education on compensatory techniques for ADL's  Education on OT services and benefits  education on general safety and fall risk prevention                      Point: Home exercise program (Done)       Learning Progress Summary            Patient CONOR Simeon VU by EG at 7/30/2025 1031    Comment: education on compensatory techniques for ADL's  Education on OT services and benefits  education on general safety and fall risk prevention                      Point: Precautions (Done)       Learning Progress Summary            Patient CONOR Simeon VU by EG at 7/30/2025 1031    Comment: education on compensatory techniques for ADL's  Education on OT services and benefits  education on general safety and fall risk prevention                      Point: Body mechanics (Done)       Learning Progress Summary            Patient CONOR Simeon VU by EG at 7/30/2025 1031    Comment: education on compensatory techniques for ADL's  Education on OT services and benefits  education on general safety and fall risk prevention                                      User Key       Initials Effective Dates Name Provider Type Discipline    EG 09/14/22 -  Farrah Garcia OT Occupational Therapist OT                  OT Recommendation and Plan  Planned Therapy Interventions (OT): activity tolerance training, functional balance retraining, occupation/activity based interventions, adaptive equipment training, BADL retraining, neuromuscular control/coordination retraining, patient/caregiver education/training,  transfer/mobility retraining, strengthening exercise  Therapy Frequency (OT): 5 times/wk  Plan of Care Review  Plan of Care Reviewed With: patient  Progress: no change  Outcome Evaluation: Patient presents with limitations of decreased functional strength, balance, endurance and limited safety/insight ongoing deficits with reports of left-sided blurry vision requiring need for skilled OT services to facilitate return to prior level of function with ADLs.     Time Calculation:   Evaluation Complexity (OT)  Review Occupational Profile/Medical/Therapy History Complexity: expanded/moderate complexity  Assessment, Occupational Performance/Identification of Deficit Complexity: 3-5 performance deficits  Clinical Decision Making Complexity (OT): detailed assessment/moderate complexity  Overall Complexity of Evaluation (OT): moderate complexity     Time Calculation- OT       Row Name 07/30/25 1033             Time Calculation- OT    OT Received On 07/30/25  -EG      OT Goal Re-Cert Due Date 08/08/25  -EG         Untimed Charges    OT Eval/Re-eval Minutes 35  -EG         Total Minutes    Untimed Charges Total Minutes 35  -EG       Total Minutes 35  -EG                User Key  (r) = Recorded By, (t) = Taken By, (c) = Cosigned By      Initials Name Provider Type    EG Farrah Garcia OT Occupational Therapist                  Therapy Charges for Today       Code Description Service Date Service Provider Modifiers Qty    [id number] HC OT EVAL MOD COMPLEXITY 3 7/30/2025 Farrah Garcia OT GO 1                 Farrah Garcia OT  7/30/2025

## 2025-07-30 NOTE — CONSULTS
Consult received per Stroke Protocol. Patient with a noted DM diagnosis, with a current HbA1c of 6.6%, and an estimated average glucose of 143 mg/dl. Patient's home regimen consists of Metformin ER 1500 mg every evening and Mounjaro 7.5 mg weekly.

## 2025-07-30 NOTE — THERAPY EVALUATION
RT EQUIPMENT DEVICE RELATED - SKIN ASSESSMENT    RT Medical Equipment/Device:     NIV Mask:  Full-face    size: m    Skin Assessment:      Cheek:  Intact  Chin:  Intact  Nares:  Intact  Neck:  Intact  Mouth:  Intact    Device Skin Pressure Protection:  Pressure points protected    Nurse Notification:  Riddhi Engel, RRT

## 2025-07-30 NOTE — THERAPY EVALUATION
Acute Care - Speech Language Pathology   Swallow Initial Evaluation OSITO Solo     Patient Name: Ifrah Robertson  : 1960  MRN: [id number]  Today's Date: 2025               Admit Date: 2025    Visit Dx:     ICD-10-CM ICD-9-CM   1. Cerebrovascular accident (CVA), unspecified mechanism  I63.9 434.91   2. Decreased activities of daily living (ADL)  Z78.9 V49.89   3. Oropharyngeal dysphagia  R13.12 787.22     Patient Active Problem List   Diagnosis    Type 2 diabetes mellitus with diabetic neuropathy, with long-term current use of insulin    Esophageal reflux    High cholesterol    Hypothyroidism    Primary insomnia    Iron deficiency anemia secondary to inadequate dietary iron intake    Depression    Lichen sclerosus et atrophicus of the vulva    Nicotine addiction    HAILEY (obstructive sleep apnea)    Stroke    Generalized osteoarthritis of multiple sites    Hemorrhoids    Leukocytosis    Vitamin D deficiency disease    Hypocalcemia    Abnormal uterine bleeding    Encounter for subsequent annual wellness visit (AWV) in Medicare patient    EIN (endometrial intraepithelial neoplasia)    Endometrial intraepithelial neoplasia (EIN)    Allergic rhinitis    Bronchitis, mucopurulent recurrent    Class 3 severe obesity due to excess calories with serious comorbidity and body mass index (BMI) of 45.0 to 49.9 in adult    Hypertension due to endocrine disorder    Anaphylaxis, initial encounter    Tooth abscess    Biliary colic    Iron deficiency    Umbilical hernia without obstruction and without gangrene    CVA (cerebrovascular accident)    HTN (hypertension)     Past Medical History:   Diagnosis Date    Anemia     Arnold-Chiari deformity     Diabetes mellitus     Dr. Cameron manages    Obesity     Smoker     Stroke 2018    no deficits, baby ASA only, hx of blood thinners 2018    Uterine cancer     Visual impairment      Past Surgical History:   Procedure Laterality Date    ANUS SURGERY      blood clot    BRAIN  SURGERY      shunt x3, and decompression    CARPAL TUNNEL RELEASE Left      SECTION      FOOT SURGERY Right     THYROID SURGERY         SLP Recommendation and Plan          Inpatient Speech Pathology Dysphagia Evaluation        PAIN SCALE: none indicated    PRECAUTIONS/CONTRAINDICATIONS:  standard    SUSPECTED ABUSE/NEGLECT/EXPLOITATION:  none noted    SOCIAL/PSYCHOLOGICAL NEEDS/BARRIERS:  none noted    PAST SOCIAL HISTORY:  64 year old female, lives at home with assist    PRIOR FUNCTION:  on a regular diet per report    PATIENT GOALS/EXPECTATIONS:  to continue eating orally    HISTORY:  64 year old female with history of CVA, GERD with new onset left sided vision loss and upper extremity weakness. CT head showed old craniotomy and left basal ganglia infarct not seen in 2019.     CURRENT DIET LEVEL:  heart healthy    OBJECTIVE:    TEST ADMINISTERED:  clinical dysphagia evaluation    COGNITION/SAFETY AWARENESS:  appears appropriate for environment however not thoroughly evaluated    BEHAVIORAL OBSERVATIONS:  awake, cooperative    ORAL MOTOR EXAM:  mild right side lingual deviation    VOICE QUALITY:  clear    REFLEX EXAM:  deferred    POSTURE:  sitting upright in bed    FEEDING/SWALLOWING FUNCTION:  assessed with thin liquids, nectar thick liquids, pureed solids, regular solids.     CLINICAL OBSERVATIONS:  Nectar thick and thin liquids by cup and straw. Swallows completed with mild delay. Sequential straw drink of thin liquids. Swallows completed with throat clearing. Repeat single controlled sip without overt s/s of aspiration. Pureed by spoon. Swallow completed with minimal delay. Regular solids. Chewing with swallow completed. Right sided residue however clears with min cues. Laryngeal elevation noted to palpation. Subtle s/s of aspiration observed with sequential straw drink of thin liquids.Mild swallow delays with all consistencies. Silent aspiration cannot be ruled out.     DYSPHAGIA CRITERIA:  risk of  aspiration    FUNCTIONAL ASSESSMENT INSTRUMENT: Patient currently scored a level 6 of 7 on Functional Communication Measures for swallowing indicating a 1-19% limitation in function.    ASSESSMENT/ PLAN OF CARE:  Pt presents with limitations, noted below, that impede patient's ability to tolerate diet safely and independently. The skills of a therapist will be required to safely and effectively implement the following treatment plan to restore maximal level of function.    PROBLEMS:  1.  Risk of aspiration, swallow delay   LTG 1: 30 days. Patient will increase functional communication measures for swallowing to level 7 of 7 indicating a 0% limitation in function.    STG 1a: 14 days. Patient will tolerate regular solids, thin liquids with min to no s/s of aspiration.    STG 1b: 14 days. Patient will utilize compensatory strategies with min assist.    STG 1c: 14 days. Patient/family education.    TREATMENT: speech therapy for dysphagia,education of strategies and tolerance of least restrictive diet.       FREQUENCY/DURATION:  daily, 5 days a week    REHAB POTENTIAL:  Pt has good rehab potential.  The following limitations may influence improvement/ length of tx  medical status.    RECOMMENDATIONS:   1.   DIET: regular solids, thin liquids    2.  POSITION: fully upright for all po, 30 minutes following    3.  COMPENSATORY STRATEGIES: small bites and sips, controlled drink/single sips    Pt/responsible party agrees with plan of care and has been informed of all alternatives, risks and benefits.                    Anticipated Discharge Disposition (SLP): anticipate therapy at next level of care (07/30/25 8222)                                                               EDUCATION  The patient has been educated in the following areas:   Dysphagia (Swallowing Impairment).                Time Calculation:                BIANCA Delaney/SLP, [facility]  7/30/2025

## 2025-07-30 NOTE — PROGRESS NOTES
Patient seen and evaluated this afternoon.  Lying in bed appears to be resting comfortably with family at bedside.  Patient still endorsing some blurriness in the left lateral vision field of the left eye though improved from presentation.  No lateralizing sensorimotor deficits appreciated bilateral upper or lower extremities.  No facial droop appreciated.  Afebrile overnight.  Sinus rhythm 60s to 100 on telemetry review.  Blood pressure within normal limits.  Satting well on room air at rest.  CPAP with naps.  Blood sugars trending up this afternoon.  Echocardiogram demonstrated grade 1 diastolic dysfunction otherwise within normal limits.  Radiology working obtaining documentation of shunt specifications prior to MRI brain.  Continue on aspirin statin therapy.  Further neurology recs pending.  PT eval pending.  OT recommending inpatient rehab.  Discharge planning to follow-up.

## 2025-07-30 NOTE — H&P
[facility]   HISTORY AND PHYSICAL    Patient Name: Ifrah Robertson  : 1960  MRN: [id number]  Primary Care Physician:  Rashid Acosta DO  Date of admission: 2025    Subjective   Subjective     Chief Complaint: Left-sided hemianopsia, left lower extremity weakness    HPI:    Ifrha Robertson is a 64 y.o. female with past medical history of diabetes, hypertension, hyperlipidemia Budd-Chiari malformation  shunt, tobacco use, prior CVA in 2018, and GERD presented to the ED with left-sided vision loss and left upper extremity weakness.  Patient states that around 7 PM while outside her home she suddenly lost peripheral vision on her left side and was somewhat unsteady on her feet.  Patient does have history of prior stroke so due to concerns she was brought to the ED for further evaluation.  In the ED patient's vitals were all within normal limits on arrival.  Labs showed some leukocytosis but were relatively unremarkable given her chronic conditions including a negative UA.  CT of the head showed showed old craniotomy and left basal ganglia infarct which was not seen in 2019 but not acute.  Perfusion study showed no core infarct but CTA of the head and neck did show possible emergent large vessel occlusion with high-grade stenosis of the right MCA bifurcation.  EKG showed sinus rhythm with RBBB and old infarcts but no significant ST change.  When seen patient said that her vision has slightly improved but still not at baseline and her left lower extremity weakness had resolved.  When asked she denied any recent fevers, chills, headaches, chest pain, palpitation, shortness of breath, cough, abdominal pain, nausea, vomiting, diarrhea, constipation, dysuria, hematuria, hematochezia, melena, or anxiety.  Patient admitted for further evaluation and treatment.    Review of Systems   All systems were reviewed and negative except for: As per HPI    Personal History     Past Medical History:   Diagnosis  Date   • Anemia    • Arnold-Chiari deformity    • Diabetes mellitus     Dr. Cameron manages   • Obesity    • Smoker    • Stroke 2018    no deficits, baby ASA only, hx of blood thinners 2018   • Uterine cancer    • Visual impairment        Past Surgical History:   Procedure Laterality Date   • ANUS SURGERY      blood clot   • BRAIN SURGERY      shunt x3, and decompression   • CARPAL TUNNEL RELEASE Left    •  SECTION     • FOOT SURGERY Right    • THYROID SURGERY         Family History: family history includes Breast cancer in her mother; Cancer in her mother; Heart disease in her mother; Stroke in her father. Otherwise pertinent FHx was reviewed and not pertinent to current issue.    Social History:  reports that she has been smoking cigarettes. She started smoking about 45 years ago. She has a 45.6 pack-year smoking history. She has never used smokeless tobacco. She reports that she does not drink alcohol and does not use drugs.    Home Medications:  EPINEPHrine, Tirzepatide, albuterol sulfate HFA, aspirin, escitalopram, fluticasone, levocetirizine, levothyroxine, metFORMIN ER, metoprolol succinate XL, pantoprazole, promethazine, and vitamin D      Allergies:  Allergies   Allergen Reactions   • Tetracycline Hcl Unknown - High Severity     INFECTED BLOOD   • Valacyclovir Hcl Hives   • Latex Rash     Rash    • Azithromycin Hives   • Cefaclor Unknown - Low Severity   • Gluten Meal Other (See Comments)   • Hydrocodone-Acetaminophen Unknown - Low Severity   • Indomethacin Unknown - Low Severity   • Ketorolac Tromethamine Unknown - Low Severity   • Statins Unknown - Low Severity   • Trazodone Dizziness       Objective   Objective     Vitals:   Temp:  [97.8 °F (36.6 °C)] 97.8 °F (36.6 °C)  Heart Rate:  [70-82] 75  Resp:  [17-18] 17  BP: (130-165)/(64-82) 132/64  Physical Exam    Constitutional: Awake, alert   Eyes: PERRLA, sclerae anicteric, no conjunctival injection   HENT: NCAT, mucous membranes moist   Neck:  Supple, no thyromegaly, no lymphadenopathy, trachea midline   Respiratory: Clear to auscultation bilaterally, nonlabored respirations    Cardiovascular: RRR, no murmurs, rubs, or gallops, palpable pedal pulses bilaterally   Gastrointestinal: Positive bowel sounds, soft, nontender, nondistended   Musculoskeletal: No bilateral ankle edema, no clubbing or cyanosis to extremities   Psychiatric: Appropriate affect, cooperative   Neurologic: Oriented x 3, strength symmetric in all extremities, Cranial Nerves grossly intact to confrontation, speech clear   Skin: No rashes     Result Review    Result Review:  I have personally reviewed the results from the time of this admission to 7/30/2025 00:14 EDT and agree with these findings:  [x]  Laboratory list / accordion  []  Microbiology  [x]  Radiology  []  EKG/Telemetry   [x]  Cardiology/Vascular   []  Pathology  []  Old records  []  Other:  Most notable findings include:  Labs showed some leukocytosis but were relatively unremarkable given her chronic conditions including a negative UA.  CT of the head showed showed old craniotomy and left basal ganglia infarct which was not seen in 2019 but not acute.  Perfusion study showed no core infarct but CTA of the head and neck did show possible emergent large vessel occlusion with high-grade stenosis of the right MCA bifurcation.  EKG showed sinus rhythm with RBBB and old infarcts but no significant ST change.        Assessment & Plan   Assessment / Plan     Brief Patient Summary:  Ifrah Robertson is a 64 y.o. female with past medical history of diabetes, hypertension, hyperlipidemia Budd-Chiari malformation  shunt, tobacco use, prior CVA in 2018, and GERD presented to the ED with left-sided vision loss and left upper extremity weakness.      Active Hospital Problems:  Active Hospital Problems    Diagnosis    • **CVA (cerebrovascular accident)    • HTN (hypertension)    • Type 2 diabetes mellitus with diabetic neuropathy, with  long-term current use of insulin    • Hypothyroidism    • HAILEY (obstructive sleep apnea)    • Nicotine addiction      Plan:     CVA  -Admit to telemetry   - Patient still left-sided hemianopsia, left lower extremity weakness resolved  -CT of the head negative for any acute findings  -CTA of the head and neck negative did show possible emergent large vessel occlusion with high-grade stenosis of the right MCA bifurcation  -MRI ordered and pending  -Statin, aspirin  -A1c, lipid panel, TSH  -PT OT  -Neurochecks  -Permissive hypertension  -Bedside swallow, diet if passed  -Echo with bubble study  -Neurology consulted  -Supportive care    Diabetes  -Insulin sliding scale  -Levemir at bedtime  -Titrate as needed    HTN  -Permissive for now  -Currently well controlled  -PRN BP meds  -Resume home meds when available  -Titrate if needed    HAILEY  - CPAP      Budd-Chiari malformation  Tobacco use    GI ppx  DVT ppx      VTE Prophylaxis:  Mechanical VTE prophylaxis orders are signed & held.          CODE STATUS:    Code Status (Patient has no pulse and is not breathing): CPR (Attempt to Resuscitate)  Medical Interventions (Patient has pulse or is breathing): Full Support  Level Of Support Discussed With: Patient    Admission Status:  I believe this patient meets inpatient status.      Electronically signed by Yusuf Nova MD, 07/30/25, 12:14 AM EDT.

## 2025-07-30 NOTE — CASE MANAGEMENT/SOCIAL WORK
Discharge Planning Assessment   [facility]     Patient Name: Ifrah Robertson  MRN: [id number]  Today's Date: 7/30/2025    Admit Date: 7/29/2025    Plan: Pt lives at home with sister Pat Crowder. Pt states sister is main caregiver and a big support. Pt does not drive, Pt denies financial stressors or concerns with transportation. PCP: DONATO Acosta. Pt has CPAP at home, Pt states she gets CPAP supplies from [employer]. Pt is open to InPt rehab or HHC if needed. PT/OT pending at this time. SW has confirmed home address and telephone. SW/CM will continue to follow for needs.   Discharge Needs Assessment       Row Name 07/30/25 1114       Living Environment    People in Home sibling(s)    Unique Family Situation Pt lives with her sister.    Current Living Arrangements apartment    Potentially Unsafe Housing Conditions none    In the past 12 months has the electric, gas, oil, or water company threatened to shut off services in your home? No    Primary Care Provided by self    Provides Primary Care For no one    Family Caregiver if Needed sibling(s)    Family Caregiver Names Pat Crowder    Quality of Family Relationships helpful;involved;supportive    Able to Return to Prior Arrangements yes       Resource/Environmental Concerns    Resource/Environmental Concerns none    Transportation Concerns none       Transportation Needs    In the past 12 months, has lack of transportation kept you from medical appointments or from getting medications? no    In the past 12 months, has lack of transportation kept you from meetings, work, or from getting things needed for daily living? No       Food Insecurity    Within the past 12 months, you worried that your food would run out before you got the money to buy more. Never true    Within the past 12 months, the food you bought just didn't last and you didn't have money to get more. Never true       Transition Planning    Patient/Family Anticipates Transition to home with family;home with  help/services    Patient/Family Anticipated Services at Transition home health care;skilled nursing    Transportation Anticipated family or friend will provide       Discharge Needs Assessment    Readmission Within the Last 30 Days no previous admission in last 30 days    Equipment Currently Used at Home cpap;glucometer    Concerns to be Addressed discharge planning    Do you want help finding or keeping work or a job? I do not need or want help    Do you want help with school or training? For example, starting or completing job training or getting a high school diploma, GED or equivalent No    Anticipated Changes Related to Illness none    Equipment Needed After Discharge none    Discharge Coordination/Progress Pt lives at home with sister Pat Crowder. Pt states sister is main caregiver and a big support. Pt does not drive, Pt denies financial stressors or concerns with transportation. PCP: DONATO Acosta. Pt has CPAP at home, Pt states she gets CPAP supplies from [employer]. Pt is open to InPt rehab or HHC if needed. PT/OT pending at this time. SW has confirmed home address and telephone. SW/CM will continue to follow for needs.                   Discharge Plan       Row Name 07/30/25 1112       Plan    Plan Pt lives at home with sister Pat Crowder. Pt states sister is main caregiver and a big support. Pt does not drive, Pt denies financial stressors or concerns with transportation. PCP: DONATO Acosta. Pt has CPAP at home, Pt states she gets CPAP supplies from [employer]. Pt is open to InPt rehab or HHC if needed. PT/OT pending at this time. SW has confirmed home address and telephone. SW/CM will continue to follow for needs.                  Continued Care and Services - Admitted Since 7/29/2025    No active coordination exists.          Demographic Summary       Row Name 07/30/25 1113       General Information    Admission Type inpatient    Arrived From emergency department    Referral Source admission list    Reason for  Consult discharge planning    Preferred Language English       Contact Information    Permission Granted to Share Info With permission denied                   Functional Status       Row Name 07/30/25 1113       Functional Status    Usual Activity Tolerance moderate    Current Activity Tolerance moderate       Physical Activity    On average, how many days per week do you engage in moderate to strenuous exercise (like a brisk walk)? 0 days    On average, how many minutes do you engage in exercise at this level? 0 min    Number of minutes of exercise per week 0       Assessment of Health Literacy    How often do you have someone help you read hospital materials? Never    How often do you have problems learning about your medical condition because of difficulty understanding written information? Never    How often do you have a problem understanding what is told to you about your medical condition? Never    How confident are you filling out medical forms by yourself? Quite a bit    Health Literacy Good       Functional Status, IADL    Medications independent;assistive person    Meal Preparation independent;assistive person    Housekeeping assistive person;independent    Laundry assistive person;independent    Shopping assistive person;independent    If for any reason you need help with day-to-day activities such as bathing, preparing meals, shopping, managing finances, etc., do you get the help you need? I get all the help I need    IADL Comments Pt lives with sister, sister is main caregiver for Pt.       Mental Status    General Appearance WDL WDL       Mental Status Summary    Recent Changes in Mental Status/Cognitive Functioning no changes       Employment/    Employment Status disabled                   Psychosocial    No documentation.                  Abuse/Neglect    No documentation.                  Legal       Row Name 07/30/25 1114       Financial Resource Strain    How hard is it for you to pay  for the very basics like food, housing, medical care, and heating? Not hard       Financial/Legal    Source of Income disability    Financial/Environmental Concerns none    Application for Public Assistance not applied       Legal    Criminal Activity/Legal Involvement none                   Substance Abuse    No documentation.                  Patient Forms    No documentation.                     Stephy Lopez

## 2025-07-30 NOTE — ED NOTES
Nursing report ED to floor  Ifrah Robertson  64 y.o.  female    HPI :  HPI  Stated Reason for Visit: left sided leg weakness, blurred vision in left eye LKN 0700 today, dizzy  History Obtained From: patient    Chief Complaint  Chief Complaint   Patient presents with    Dizziness    Blurred Vision       Admitting doctor:   Yusuf Nova MD    Admitting diagnosis:   The encounter diagnosis was Cerebrovascular accident (CVA), unspecified mechanism.    Code status:   Current Code Status       Date Active Code Status Order ID Comments User Context       7/30/2025 0010 CPR (Attempt to Resuscitate) [id number]  Yusuf Nova MD ED        Question Answer    Code Status (Patient has no pulse and is not breathing) CPR (Attempt to Resuscitate)    Medical Interventions (Patient has pulse or is breathing) Full Support    Level Of Support Discussed With Patient                    Allergies:   Tetracycline hcl, Valacyclovir hcl, Latex, Azithromycin, Cefaclor, Gluten meal, Hydrocodone-acetaminophen, Indomethacin, Ketorolac tromethamine, Statins, and Trazodone    Isolation:   No active isolations    Intake and Output  No intake or output data in the 24 hours ending 07/30/25 0056    Weight:       07/29/25  2141   Weight: 125 kg (276 lb 3.8 oz)       Most recent vitals:   Vitals:    07/29/25 2320 07/29/25 2330 07/30/25 0000 07/30/25 0030   BP:  130/82 132/64    Patient Position:       Pulse: 82 77 75 87   Resp:       Temp:       TempSrc:       SpO2: 95% 99% 97% 96%   Weight:       Height:           Active LDAs/IV Access:   Lines, Drains & Airways       Active LDAs       Name Placement date Placement time Site Days    Peripheral IV 07/29/25 2105 20 G Right Antecubital 07/29/25 2105  Antecubital  less than 1                    Labs (abnormal labs have a star):   Labs Reviewed   COMPREHENSIVE METABOLIC PANEL - Abnormal; Notable for the following components:       Result Value    Glucose 148 (*)     BUN 5.4 (*)     Calcium 8.2 (*)      Anion Gap 15.1 (*)     All other components within normal limits    Narrative:     GFR Categories in Chronic Kidney Disease (CKD)              GFR Category          GFR (mL/min/1.73)    Interpretation  G1                    90 or greater        Normal or high (1)  G2                    60-89                Mild decrease (1)  G3a                   45-59                Mild to moderate decrease  G3b                   30-44                Moderate to severe decrease  G4                    15-29                Severe decrease  G5                    14 or less           Kidney failure    (1)In the absence of evidence of kidney disease, neither GFR category G1 or G2 fulfill the criteria for CKD.    eGFR calculation 2021 CKD-EPI creatinine equation, which does not include race as a factor   APTT - Abnormal; Notable for the following components:    PTT 22.8 (*)     All other components within normal limits   CBC WITH AUTO DIFFERENTIAL - Abnormal; Notable for the following components:    WBC 13.46 (*)     RBC 5.51 (*)     Hematocrit 47.4 (*)     Lymphocyte % 19.1 (*)     Immature Grans % 0.7 (*)     Neutrophils, Absolute 9.70 (*)     Immature Grans, Absolute 0.09 (*)     All other components within normal limits   POCT GLUCOSE FINGERSTICK - Abnormal; Notable for the following components:    Glucose 132 (*)     All other components within normal limits   PROTIME-INR - Normal    Narrative:     Suggested Therapeutic Ranges For Oral Anticoagulant Therapy:  Level of Therapy                      INR Target Range  Standard Dose                            2.0-3.0  High Dose                                2.5-3.5  Patients not receiving anticoagulant  Therapy Normal Range                     0.86-1.15   RAINBOW DRAW    Narrative:     The following orders were created for panel order [place] Draw.  Procedure                               Abnormality         Status                     ---------                                -----------         ------                     Green Top (Gel)[[id number]]                                  Final result               Lavender Top[[id number]]                                     Final result               Gold Top - SST[[id number]]                                   Final result               Light Blue Top[[id number]]                                   Final result                 Please view results for these tests on the individual orders.   URINALYSIS W/ MICROSCOPIC IF INDICATED (NO CULTURE)   POCT GLUCOSE FINGERSTICK   CBC AND DIFFERENTIAL    Narrative:     The following orders were created for panel order CBC & Differential.  Procedure                               Abnormality         Status                     ---------                               -----------         ------                     CBC Auto Differential[[id number]]        Abnormal            Final result                 Please view results for these tests on the individual orders.   GREEN TOP   LAVENDER TOP   GOLD TOP - SST   LIGHT BLUE TOP       EKG:   ECG 12 Lead ED Triage Standing Order; Acute Stroke (Onset <12 hrs)   Preliminary Result   HEART RATE=74  bpm   RR Rlvqhdkg=112  ms   OR Nilwrxwy=689  ms   P Horizontal Axis=27  deg   P Front Axis=43  deg   QRSD Rxjacvhu=448  ms   QT Hygztnog=577  ms   MGrQ=159  ms   QRS Axis=-74  deg   T Wave Axis=22  deg   - ABNORMAL ECG -   Sinus rhythm   RBBB and LAFB   Inferior infarct, old   When compared with ECG of 11-Dec-2022 02:47:29,   Significant rate decrease   Date and Time of Study:2025-07-29 21:45:19          Meds given in ED:   Medications   sodium chloride 0.9 % flush 10 mL (has no administration in time range)   iopamidol (ISOVUE-370) 76 % injection 100 mL (has no administration in time range)   ondansetron (ZOFRAN) injection 4 mg (4 mg Intravenous Given 7/29/25 2116)   iopamidol (ISOVUE-370) 76 % injection 100 mL (40 mL Intravenous Given 7/29/25 2124)   iopamidol (ISOVUE-370) 76 %  injection 100 mL (100 mL Intravenous Given 7/29/25 2126)       Imaging results:  CT Angiogram Head w AI Analysis of LVO  Result Date: 7/29/2025  (COMBINED IMPRESSION) 1. The study is ABNORMAL. 2. There is a possible emergent large vessel occlusion versus high-grade stenosis at the right MCA bifurcation involving the origins of the superior and inferior division of the M2 segments of the right middle cerebral artery (MCA) territory. CT/CTA suggests the possibility of an acute-to-subacute infarct within the right MCA distribution, including portions of the anterior right basal ganglia. 3. Mild-to-moderate narrowing involves the distal cavernous and supraclinoid right internal carotid artery (ICA). 4. There is mild-to-moderate luminal narrowing of the proximal left internal carotid artery, which is estimated at 49% by NASCET criteria; this finding is predominantly due to noncalcified atherosclerotic plaque. 5. Otherwise, no definite hemodynamically significant arterial stenoses are seen. 6. The left vertebral artery is dominant. 7. No cerebral aneurysm. 8. Please see above comments for further detail. CRITICAL RESULT: The critical finding(s), as under number 2 of the impression, was (were) discussed with Dr. Tee (ordering/attending [facility] ED Physician) by phone at approximately 10:33 p.m. on 7/29/2025. Portions of this note were completed with a voice recognition program. Electronically Signed: Balaji Wang MD  7/29/2025 10:49 PM EDT  Workstation ID: [id number]    CT Angiogram Neck  Result Date: 7/29/2025  (COMBINED IMPRESSION) 1. The study is ABNORMAL. 2. There is a possible emergent large vessel occlusion versus high-grade stenosis at the right MCA bifurcation involving the origins of the superior and inferior division of the M2 segments of the right middle cerebral artery (MCA) territory. CT/CTA suggests the possibility of an acute-to-subacute infarct within the right MCA distribution, including portions of the  anterior right basal ganglia. 3. Mild-to-moderate narrowing involves the distal cavernous and supraclinoid right internal carotid artery (ICA). 4. There is mild-to-moderate luminal narrowing of the proximal left internal carotid artery, which is estimated at 49% by NASCET criteria; this finding is predominantly due to noncalcified atherosclerotic plaque. 5. Otherwise, no definite hemodynamically significant arterial stenoses are seen. 6. The left vertebral artery is dominant. 7. No cerebral aneurysm. 8. Please see above comments for further detail. CRITICAL RESULT: The critical finding(s), as under number 2 of the impression, was (were) discussed with Dr. Tee (ordering/attending [facility] ED Physician) by phone at approximately 10:33 p.m. on 7/29/2025. Portions of this note were completed with a voice recognition program. Electronically Signed: Balaji Wang MD  7/29/2025 10:49 PM EDT  Workstation ID: [id number]    XR Chest 1 View  Result Date: 7/29/2025  Impression: No evidence of active chest disease. Electronically Signed: Clifford Gallo MD  7/29/2025 10:04 PM EDT  Workstation ID: [id number]    CT CEREBRAL PERFUSION WITH & WITHOUT CONTRAST  Result Date: 7/29/2025  No core infarct is suggested using established thresholds. The cerebral hypoperfusion portion of the study is thought to be nondiagnostic due to motion artifact. CBF < 30% (core infarct volume or vol.): 0 mL. Tmax > 6 sec (hypoperfusion vol.): 63 mL. Mismatch difference (or mismatch vol. or penumbra): 63 mL. Mismatch ratio (hypoperfusion/core infarct): Undefined. Portions of this note were completed with a voice recognition program. Electronically Signed: Balaji Wang MD  7/29/2025 9:51 PM EDT  Workstation ID: [id number]    CT Head Without Contrast Stroke Protocol  Result Date: 7/29/2025  Impression: 1. Left suboccipital craniectomy as on prior study. Right parietal approach ventricular shunt catheter, in stable position since 2018. Small diameter lateral  ventricles unchanged. 2. 1 cm right basal ganglia infarct, new since 2018 but old by CT criteria. 3. Increased prominence of the frontal CSF spaces since 2018 which may represent expansion of the CSF due to interval brain atrophy. Differential would include small subdural hygromas, or even old subdural hemorrhages. No evidence, however, of acute or recent blood products here or elsewhere. No evidence of acute intracranial disease. Electronically Signed: Clifford Gallo MD  7/29/2025 9:16 PM EDT  Workstation ID: [id number]      Ambulatory status:   - STANDY-BY    Social issues:   Social History     Socioeconomic History    Marital status:    Tobacco Use    Smoking status: Every Day     Current packs/day: 1.00     Average packs/day: 1 pack/day for 45.6 years (45.6 ttl pk-yrs)     Types: Cigarettes     Start date: 1980    Smokeless tobacco: Never   Vaping Use    Vaping status: Former    Substances: Nicotine, Flavoring   Substance and Sexual Activity    Alcohol use: Never    Drug use: Never    Sexual activity: Defer       Peripheral Neurovascular  Peripheral Neurovascular (Adult)  Peripheral Neurovascular WDL: WDL  LUE Neurovascular Assessment  Temperature LUE: warm  Color LUE: no discoloration  Sensation LUE: no numbness, no tenderness, no tingling  LLE Neurovascular Assessment  Temperature LLE: warm  Color LLE: no discoloration  Sensation LLE: no numbness, no tenderness, no tingling    Neuro Cognitive  Neuro Cognitive (Adult)  Cognitive/Neuro/Behavioral WDL: WDL  Seb Coma Scale  Best Eye Response: 4-->(E4) spontaneous  Best Motor Response: 6-->(M6) obeys commands  Best Verbal Response: 5-->(V5) oriented  Seb Coma Scale Score: 15  NIH Stroke Scale  Interval: baseline  1a. Level of Consciousness: 0-->Alert, keenly responsive  1b. LOC Questions: 0-->Answers both questions correctly  1c. LOC Commands: 0-->Performs both tasks correctly  2. Best Gaze: 0-->Normal  3. Visual: 1-->Partial hemianopia  4. Facial Palsy:  0-->Normal symmetrical movements  5a. Motor Arm, Left: 0-->No drift, limb holds 90 (or 45) degrees for full 10 secs  5b. Motor Arm, Right: 0-->No drift, limb holds 90 (or 45) degrees for full 10 secs  6a. Motor Leg, Left: 1-->Drift, leg falls by the end of the 5-sec period but does not hit bed  6b. Motor Leg, Right: 0-->No drift, leg holds 30 degree position for full 5 secs  7. Limb Ataxia: 1-->Present in one limb  8. Sensory: 0-->Normal, no sensory loss  9. Best Language: 0-->No aphasia, normal  10. Dysarthria: 0-->Normal  11. Extinction and Inattention (formerly Neglect): 0-->No abnormality  Total (NIH Stroke Scale): 3    Learning  Learning Assessment  Learning Readiness and Ability: no barriers identified  Education Provided  Person Taught: patient  Teaching Method: verbal instruction  Teaching Focus: symptom/problem overview, diagnostic test, medical device/equipment use  Education Outcome Evaluation: eager to learn, verbalizes understanding    Respiratory  Respiratory WDL  Respiratory WDL: WDL    Abdominal Pain       Pain Assessments  Pain (Adult)  (0-10) Pain Rating: Rest: 0  (0-10) Pain Rating: Activity: 0    NIH Stroke Scale  NIH Stroke Scale  Interval: baseline  1a. Level of Consciousness: 0-->Alert, keenly responsive  1b. LOC Questions: 0-->Answers both questions correctly  1c. LOC Commands: 0-->Performs both tasks correctly  2. Best Gaze: 0-->Normal  3. Visual: 1-->Partial hemianopia  4. Facial Palsy: 0-->Normal symmetrical movements  5a. Motor Arm, Left: 0-->No drift, limb holds 90 (or 45) degrees for full 10 secs  5b. Motor Arm, Right: 0-->No drift, limb holds 90 (or 45) degrees for full 10 secs  6a. Motor Leg, Left: 1-->Drift, leg falls by the end of the 5-sec period but does not hit bed  6b. Motor Leg, Right: 0-->No drift, leg holds 30 degree position for full 5 secs  7. Limb Ataxia: 1-->Present in one limb  8. Sensory: 0-->Normal, no sensory loss  9. Best Language: 0-->No aphasia, normal  10.  Dysarthria: 0-->Normal  11. Extinction and Inattention (formerly Neglect): 0-->No abnormality  Total (NIH Stroke Scale): 3    Leighann Jaimes RN  07/30/25 00:56 EDT

## 2025-07-30 NOTE — PLAN OF CARE
Goal Outcome Evaluation:  Plan of Care Reviewed With: patient        Progress: no change  Outcome Evaluation: VSS. MRI and ECHO ordered to be completed today. No acute changes this shift. Zofran given x1 for nausea. Cpap ordered at night.

## 2025-07-30 NOTE — PLAN OF CARE
Goal Outcome Evaluation:  Plan of Care Reviewed With: patient        Progress: no change  Outcome Evaluation: Patient presents with limitations of decreased functional strength, balance, endurance and limited safety/insight ongoing deficits with reports of left-sided blurry vision requiring need for skilled OT services to facilitate return to prior level of function with ADLs.    Anticipated Discharge Disposition (OT): skilled nursing facility

## 2025-07-30 NOTE — PLAN OF CARE
Goal Outcome Evaluation:  Plan of Care Reviewed With: patient           Outcome Evaluation: Patient wearing cpap settings: 10/r12/21%. Patient on room air when off cpap.

## 2025-07-30 NOTE — NURSING NOTE
Patient Name: Ifrah Robertson  MRN: [id number]  Admission Date and Time: 7/29/2025  8:54 PM   Today's Date and Time: 07/30/25 04:05 EDT      A 4-eyes skin assessment completed with a second nurse Leonard Almanzar as part of the admission assessment.     Per policy, the following skin interventions were put in place as a result of the skin assessment below: None - David is greater than 18 and no skin issues noted    Skin Assessments (most recent)      Flowsheet Row Most Recent Value   Skin WDL WDL   Skin Integrity intact   Sensory Perception 4-->no impairment   Moisture 4-->rarely moist   Activity 3-->walks occasionally   Mobility 3-->slightly limited   Nutrition 3-->adequate   Friction and Shear 3-->no apparent problem   David Score 20   Device Skin Pressure Protection pressure points protected   Pressure Reduction Devices pressure-redistributing mattress utilized   Pressure Reduction Techniques frequent weight shift encouraged   Interval baseline            Wounds to the following locations were noted on assessment: None          Jackelin Branch RN  07/30/25 4:05 AM EDT

## 2025-07-30 NOTE — SIGNIFICANT NOTE
07/30/25 1500   Physical Therapy Time and Intention   Session Not Performed patient unavailable for evaluation   Comment, Session Not Performed nsg staff with patient;

## 2025-07-30 NOTE — CONSULTS
ADDENDUM:  Advanced Imaging:    CTA Head and Neck Completed.    CTP Completed.    LVO:Yes    Discussed with HARESH :Yes    Initial Call Time To HARESH : 07/29/2025 23:32:30    HARESH Accept/Decision Time : 07/29/2025 23:34:54    Discussed with HARESH Time : 07/29/2025 23:32:30    Discussed with HARESH Text : Called stroke on call at [facility]. Images pushed over. No clear LVO seen by either me or him. Low stroke scale. Symptoms don't clearly correlate. Will keep patient here for stroke workup. Re-discussed with ED Dr. Tee as well.    Neurointerventionalist Accepted Case : Patient is not a candidate for intervention.        ----        [facility]  [facility]      Patient Name:   Ifrah Robertson  YOB: 1960  Identification Number:   MRN - [id number]  Date of Service:   07/29/2025 20:51:18    Diagnosis:        H53.453 - Other localized visual field defect, bilateral.    Impression:       63 yo RH F with h/o prior stroke, diabetes, Arnold-Chiari malformation, uterine cancer in remission never had mets, tobacco use, presenting with L leg weakness, L eye blurred vs missing vision, dizziness that feels like imbalance. On exam, I am not getting a clear field cut on limited video exam, but she definitely does have more difficulty seeing the left side of pictures, possibly some left neglect, as well as L leg weakness. This has happened before and has been attributed to migraine but she reports no MRI was done. Given focality of her symptoms with a history of prior stroke and diabetes, I do think she needs admission for further neuroimaging. I would not consider this a migraine despite prior history unless other central issues were ruled out, especially as we have no prior neuroimaging or neurology notes. She is not a thrombolytic candidate due to being out of the window. Advanced imaging is pending. Case discussed with ED Dr. Tee by phone.    Our recommendations are  outlined below.    Recommendations:          Stroke/Telemetry Floor        Neuro Checks (Q2)        Bedside Swallow Eval        DVT Prophylaxis        IV Fluids, Normal Saline        Head of Bed 30 Degrees        Euglycemia and Avoid Hyperthermia (PRN Acetaminophen)        Antihypertensives PRN if Blood pressure is greater than 220/120 or there is a concern for End organ damage/contraindications for permissive HTN. If blood pressure is greater than 220/120 give labetalol PO or IV or Vasotec IV with a goal of 15% reduction in BP during the first 24 hours.         for today, then back to home 81 mg daily, may change depending on results of workup    Sign Out:        Discussed with Emergency Department Provider        ------------------------------------------------------------------------------    Advanced Imaging:  Advanced imaging has been ordered. Results pending.      Metrics:  Last Known Well: 07/29/2025 07:00:00  Dispatch Time: 07/29/2025 20:50:32  Arrival Time: 07/29/2025 20:42:00  Initial Response Time: 07/29/2025 20:56:22  Symptoms: L leg weakness, L eye blurred vision, dizziness.  Initial patient interaction: 07/29/2025 20:58:41  NIHSS Assessment Completed: 07/29/2025 21:04:30  Patient is not a candidate for Thrombolytic.  Thrombolytic Medical Decision: 07/29/2025 21:04:30  Patient was not deemed candidate for Thrombolytic because of following reasons:  LKW outside 4.5 hr window. .    CT Head:  CT head unremarkable for acute infarction or hemorrhage per Radiology: L suboccipital craniectomy, R parietal approach ventricular shunt, prior R BG infarct  I personally reviewed all the CT images that were available to me and it showed: No obvious new stroke or bleed. Prior suboccipital crani, ventricular shunt, R BG lacunar stroke    Primary Provider Notified of Diagnostic Impression and Management Plan on: 07/29/2025  21:32:39        ------------------------------------------------------------------------------    History of Present Illness:  Patient is a 64 year old Female.    Patient was brought by private transportation with symptoms of L leg weakness, L eye blurred vision, dizziness.  65 yo RH F with h/o prior stroke, diabetes, Arnold-Chiari malformation, uterine cancer in remission never had mets, tobacco use, presenting with L leg weakness, L eye blurred vision, dizziness.    LKN 0700 this morning. She was up already because she couldn't sleep. She sat on the couch and turned on the TV and noticed the L half of it was black. She was also having trouble walking, she felt because she couldn't see well. She was dizzy and off balance. She was holding on to walls. No falls. she started getting a headache on the R frontal area so took 2 Tylenols.    This has happened 2-3 times before. She was told it was an ocular migraine without head pain. She figured that was what was happening again. She reports no MRI was done.    WBC 13.46. She's been having diarrhea recently, but reports she does have leukocytosis all the time.    She had a prior stroke in 2018. Symptoms were R face/arm weakness and R arm numbness. She's not sure of any other details. She's been taking ASA since.      Past Medical History:       Diabetes Mellitus       Stroke  Other PMH:  Arnold-Chiari malformation, uterine cancer in remission never had mets    Medications:    No Anticoagulant use   Antiplatelet use: Yes ASA 81  Reviewed EMR for current medications    Allergies:   Reviewed    Social History:  Smoking: Yes  Alcohol Use: No  Drug Use: No    Family History:    There is no family history of premature cerebrovascular disease pertinent to this consultation    ROS :  14 Points Review of Systems was performed and was negative except mentioned in HPI.    Past Surgical History:  There Is No Surgical History Contributory To Today’s  Visit        Examination:  BP(165/82), Pulse(83), Blood Glucose(132)  1A: Level of Consciousness - Alert; keenly responsive + 0  1B: Ask Month and Age - Both Questions Right + 0  1C: Blink Eyes & Squeeze Hands - Performs Both Tasks + 0  2: Test Horizontal Extraocular Movements - Normal + 0  3: Test Visual Fields - No Visual Loss + 0  4: Test Facial Palsy (Use Grimace if Obtunded) - Normal symmetry + 0  5A: Test Left Arm Motor Drift - No Drift for 10 Seconds + 0  5B: Test Right Arm Motor Drift - No Drift for 10 Seconds + 0  6A: Test Left Leg Motor Drift - Drift, but doesn't hit bed + 1  6B: Test Right Leg Motor Drift - No Drift for 5 Seconds + 0  7: Test Limb Ataxia (FNF/Heel-Shin) - No Ataxia + 0  8: Test Sensation - Mild-Moderate Loss: Less Sharp/More Dull + 1  9: Test Language/Aphasia - Normal; No aphasia + 0  10: Test Dysarthria - Normal + 0  11: Test Extinction/Inattention - Visual/tactile/auditory/spatial/personal inattention + 1    NIHSS Score: 3    NIHSS Free Text :   No nystagmus   Sensation decreased L leg (unclear if baseline)   Harder to see L side of cookie theft picture (initially only saw woman and boy and not girl)    Pre-Morbid Modified Micanopy Scale:  0 Points = No symptoms at all    Spoke with : Dr. Tee  I reviewed the available imaging via [facility] and initiated discussion with the primary provider    This consult was conducted in real time using interactive audio and video technology. Patient was informed of the technology being used for this visit and agreed to proceed. Patient located in hospital and provider located at home/office setting.      Patient is being evaluated for possible acute neurologic impairment and high probability of imminent or life-threatening deterioration. I spent total of 50 minutes providing care to this patient, including time for face to face visit via telemedicine, review of medical records, imaging studies and discussion of findings with providers, the patient  and/or family.      Dr Shonda Govea      [facility]  For Inpatient follow-up with [facility] physician please call [facility] at [phone]. As we are not an outpatient service for any post hospital discharge needs please contact the hospital for assistance.  If you have any questions for the [facility] physicians or need to reconsult for clinical or diagnostic changes please contact us via [facility] at [phone].

## 2025-07-31 ENCOUNTER — READMISSION MANAGEMENT (OUTPATIENT)
Dept: CALL CENTER | Facility: HOSPITAL | Age: 65
End: 2025-07-31
Payer: MEDICARE

## 2025-07-31 VITALS
HEIGHT: 68 IN | DIASTOLIC BLOOD PRESSURE: 67 MMHG | TEMPERATURE: 97.7 F | SYSTOLIC BLOOD PRESSURE: 110 MMHG | OXYGEN SATURATION: 95 % | WEIGHT: 272.27 LBS | BODY MASS INDEX: 41.26 KG/M2 | HEART RATE: 71 BPM | RESPIRATION RATE: 18 BRPM

## 2025-07-31 LAB
ALBUMIN SERPL-MCNC: 3.6 G/DL (ref 3.5–5.2)
ANION GAP SERPL CALCULATED.3IONS-SCNC: 10.2 MMOL/L (ref 5–15)
BUN SERPL-MCNC: 6.5 MG/DL (ref 8–23)
BUN/CREAT SERPL: 7.7 (ref 7–25)
CALCIUM SPEC-SCNC: 8.9 MG/DL (ref 8.6–10.5)
CHLORIDE SERPL-SCNC: 102 MMOL/L (ref 98–107)
CO2 SERPL-SCNC: 25.8 MMOL/L (ref 22–29)
CREAT SERPL-MCNC: 0.84 MG/DL (ref 0.57–1)
DEPRECATED RDW RBC AUTO: 45.5 FL (ref 37–54)
EGFRCR SERPLBLD CKD-EPI 2021: 77.7 ML/MIN/1.73
ERYTHROCYTE [DISTWIDTH] IN BLOOD BY AUTOMATED COUNT: 14.3 % (ref 12.3–15.4)
GLUCOSE BLDC GLUCOMTR-MCNC: 121 MG/DL (ref 70–99)
GLUCOSE BLDC GLUCOMTR-MCNC: 154 MG/DL (ref 70–99)
GLUCOSE SERPL-MCNC: 123 MG/DL (ref 65–99)
HCT VFR BLD AUTO: 42.8 % (ref 34–46.6)
HGB BLD-MCNC: 13.6 G/DL (ref 12–15.9)
MCH RBC QN AUTO: 27.7 PG (ref 26.6–33)
MCHC RBC AUTO-ENTMCNC: 31.8 G/DL (ref 31.5–35.7)
MCV RBC AUTO: 87.2 FL (ref 79–97)
PHOSPHATE SERPL-MCNC: 4.4 MG/DL (ref 2.5–4.5)
PLATELET # BLD AUTO: 258 10*3/MM3 (ref 140–450)
PMV BLD AUTO: 10.6 FL (ref 6–12)
POTASSIUM SERPL-SCNC: 4.5 MMOL/L (ref 3.5–5.2)
QT INTERVAL: 433 MS
QTC INTERVAL: 481 MS
RBC # BLD AUTO: 4.91 10*6/MM3 (ref 3.77–5.28)
SODIUM SERPL-SCNC: 138 MMOL/L (ref 136–145)
WBC NRBC COR # BLD AUTO: 12.93 10*3/MM3 (ref 3.4–10.8)

## 2025-07-31 PROCEDURE — 94664 DEMO&/EVAL PT USE INHALER: CPT

## 2025-07-31 PROCEDURE — 97161 PT EVAL LOW COMPLEX 20 MIN: CPT

## 2025-07-31 PROCEDURE — 94799 UNLISTED PULMONARY SVC/PX: CPT

## 2025-07-31 PROCEDURE — 63710000001 INSULIN LISPRO (HUMAN) PER 5 UNITS: Performed by: FAMILY MEDICINE

## 2025-07-31 PROCEDURE — 99239 HOSP IP/OBS DSCHRG MGMT >30: CPT | Performed by: FAMILY MEDICINE

## 2025-07-31 PROCEDURE — 99232 SBSQ HOSP IP/OBS MODERATE 35: CPT | Performed by: PSYCHIATRY & NEUROLOGY

## 2025-07-31 PROCEDURE — 94761 N-INVAS EAR/PLS OXIMETRY MLT: CPT

## 2025-07-31 PROCEDURE — 85027 COMPLETE CBC AUTOMATED: CPT | Performed by: FAMILY MEDICINE

## 2025-07-31 PROCEDURE — 25010000002 DIPHENHYDRAMINE PER 50 MG: Performed by: FAMILY MEDICINE

## 2025-07-31 PROCEDURE — 82948 REAGENT STRIP/BLOOD GLUCOSE: CPT | Performed by: FAMILY MEDICINE

## 2025-07-31 PROCEDURE — 82948 REAGENT STRIP/BLOOD GLUCOSE: CPT

## 2025-07-31 PROCEDURE — 80069 RENAL FUNCTION PANEL: CPT | Performed by: FAMILY MEDICINE

## 2025-07-31 PROCEDURE — 92526 ORAL FUNCTION THERAPY: CPT

## 2025-07-31 RX ORDER — ATORVASTATIN CALCIUM 80 MG/1
80 TABLET, FILM COATED ORAL NIGHTLY
Qty: 30 TABLET | Refills: 0 | Status: SHIPPED | OUTPATIENT
Start: 2025-07-31 | End: 2025-08-30

## 2025-07-31 RX ADMIN — ASPIRIN 81 MG: 81 TABLET, CHEWABLE ORAL at 09:27

## 2025-07-31 RX ADMIN — INSULIN LISPRO 2 UNITS: 100 INJECTION, SOLUTION INTRAVENOUS; SUBCUTANEOUS at 12:41

## 2025-07-31 RX ADMIN — DIPHENHYDRAMINE HYDROCHLORIDE 25 MG: 50 INJECTION, SOLUTION INTRAMUSCULAR; INTRAVENOUS at 09:31

## 2025-07-31 RX ADMIN — Medication 10 ML: at 09:27

## 2025-07-31 RX ADMIN — PANTOPRAZOLE SODIUM 40 MG: 40 TABLET, DELAYED RELEASE ORAL at 09:27

## 2025-07-31 RX ADMIN — ESCITALOPRAM OXALATE 20 MG: 10 TABLET ORAL at 09:27

## 2025-07-31 RX ADMIN — IPRATROPIUM BROMIDE AND ALBUTEROL SULFATE 3 ML: .5; 3 SOLUTION RESPIRATORY (INHALATION) at 08:59

## 2025-07-31 RX ADMIN — LEVOTHYROXINE SODIUM 100 MCG: 0.1 TABLET ORAL at 05:23

## 2025-07-31 NOTE — THERAPY TREATMENT NOTE
Acute Care - Speech Language Pathology   Swallow Treatment Note OSITO Solo     Patient Name: Ifrah Robertson  : 1960  MRN: [id number]  Today's Date: 2025               Admit Date: 2025    Visit Dx:     ICD-10-CM ICD-9-CM   1. Cerebrovascular accident (CVA), unspecified mechanism  I63.9 434.91   2. Decreased activities of daily living (ADL)  Z78.9 V49.89   3. Oropharyngeal dysphagia  R13.12 787.22   4. Difficulty in walking  R26.2 719.7     Patient Active Problem List   Diagnosis    Type 2 diabetes mellitus with diabetic neuropathy, with long-term current use of insulin    Esophageal reflux    High cholesterol    Hypothyroidism    Primary insomnia    Iron deficiency anemia secondary to inadequate dietary iron intake    Depression    Lichen sclerosus et atrophicus of the vulva    Nicotine addiction    HAILEY (obstructive sleep apnea)    Stroke    Generalized osteoarthritis of multiple sites    Hemorrhoids    Leukocytosis    Vitamin D deficiency disease    Hypocalcemia    Abnormal uterine bleeding    Encounter for subsequent annual wellness visit (AWV) in Medicare patient    EIN (endometrial intraepithelial neoplasia)    Endometrial intraepithelial neoplasia (EIN)    Allergic rhinitis    Bronchitis, mucopurulent recurrent    Class 3 severe obesity due to excess calories with serious comorbidity and body mass index (BMI) of 45.0 to 49.9 in adult    Hypertension due to endocrine disorder    Anaphylaxis, initial encounter    Tooth abscess    Biliary colic    Iron deficiency    Umbilical hernia without obstruction and without gangrene    CVA (cerebrovascular accident)    HTN (hypertension)     Past Medical History:   Diagnosis Date    Anemia     Arnold-Chiari deformity     Diabetes mellitus     Dr. Cameron manages    Obesity     Smoker     Stroke 2018    no deficits, baby ASA only, hx of blood thinners 2018    Uterine cancer     Visual impairment      Past Surgical History:   Procedure Laterality Date     ANUS SURGERY      blood clot    BRAIN SURGERY      shunt x3, and decompression    CARPAL TUNNEL RELEASE Left      SECTION      FOOT SURGERY Right     THYROID SURGERY         SLP Recommendation and Plan         SPEECH PATHOLOGY DYSPHAGIA TREATMENT    Subjective/Behavioral Observations: awake, cooperative, sitting on side of bed. Patient reporting continued left eye blurriness.         Day/time of Treatment:25        Current Diet:regular, thin liquids        Current Strategies:small bites and sips, controlled drink        Treatment received:focused on dysphagia goals        Results of treatment:Patient feeding self independently. Single sips of liquids by cup and straw. Swallows completed with minimal delay. No overt clinical s/s of aspiration observed. Regular solids with adequate chewing/oral manipulation. Consumed 100% of meal.         Progress toward goals:goals met        Barriers to Achieving goals: n/a        Plan of care:/changes in plan: no further speech therapy services recommended. Goals met. Patient tolerated regular solids, thin liquids without overt s/s of aspiration.                              Anticipated Discharge Disposition (SLP): anticipate therapy at next level of care (25 1339)                                                               EDUCATION  The patient has been educated in the following areas:   Dysphagia (Swallowing Impairment).                Time Calculation:    Time Calculation- SLP       Row Name 25 1047             Time Calculation- SLP    SLP Stop Time 0800  -SN      SLP Received On 25  -SN         Untimed Charges    42476-KT Treatment Swallow Minutes 45  -SN         Total Minutes    Untimed Charges Total Minutes 45  -SN       Total Minutes 45  -SN                User Key  (r) = Recorded By, (t) = Taken By, (c) = Cosigned By      Initials Name Provider Type    Denise Malhotra MS-CCC/SLP, CNT Speech and Language Pathologist                     Therapy Charges for Today       Code Description Service Date Service Provider Modifiers Qty    06351471496  ST EVAL ORAL PHARYNG SWALLOW 4 7/30/2025 Denise High MS-CCC/SLP, CNT GN 1    [id number]  ST TREATMENT SWALLOW 3 7/31/2025 Denise High MS-CCC/SLP, CNT GN 1                 BIANCA Delaney/BALTAZAR, MIKI  7/31/2025

## 2025-07-31 NOTE — PLAN OF CARE
Goal Outcome Evaluation:  Plan of Care Reviewed With: patient        Progress: no change  Outcome Evaluation: NIH 0 this shift. VSS. Itching continues benadryl given once. Awaiting verification for MRI to be completed.

## 2025-07-31 NOTE — PLAN OF CARE
Goal Outcome Evaluation:  Plan of Care Reviewed With: (P) patient           Outcome Evaluation: (P) Patient presents with decreased balance and endurance limiting safe ambulation. Skilled rehab services are required to address deficits. Recommend quad tip cane for ambulation.    Anticipated Discharge Disposition (PT): (P) sub acute care setting

## 2025-07-31 NOTE — THERAPY EVALUATION
Acute Care - Physical Therapy Initial Evaluation   [facility]     Patient Name: Ifrah Robertson  : 1960  MRN: [id number]  Today's Date: 2025      Visit Dx:     ICD-10-CM ICD-9-CM   1. Cerebrovascular accident (CVA), unspecified mechanism  I63.9 434.91   2. Decreased activities of daily living (ADL)  Z78.9 V49.89   3. Oropharyngeal dysphagia  R13.12 787.22   4. Difficulty in walking  R26.2 719.7     Patient Active Problem List   Diagnosis    Type 2 diabetes mellitus with diabetic neuropathy, with long-term current use of insulin    Esophageal reflux    High cholesterol    Hypothyroidism    Primary insomnia    Iron deficiency anemia secondary to inadequate dietary iron intake    Depression    Lichen sclerosus et atrophicus of the vulva    Nicotine addiction    HAILEY (obstructive sleep apnea)    Stroke    Generalized osteoarthritis of multiple sites    Hemorrhoids    Leukocytosis    Vitamin D deficiency disease    Hypocalcemia    Abnormal uterine bleeding    Encounter for subsequent annual wellness visit (AWV) in Medicare patient    EIN (endometrial intraepithelial neoplasia)    Endometrial intraepithelial neoplasia (EIN)    Allergic rhinitis    Bronchitis, mucopurulent recurrent    Class 3 severe obesity due to excess calories with serious comorbidity and body mass index (BMI) of 45.0 to 49.9 in adult    Hypertension due to endocrine disorder    Anaphylaxis, initial encounter    Tooth abscess    Biliary colic    Iron deficiency    Umbilical hernia without obstruction and without gangrene    CVA (cerebrovascular accident)    HTN (hypertension)     Past Medical History:   Diagnosis Date    Anemia     Arnold-Chiari deformity     Diabetes mellitus     Dr. Cameron manages    Obesity     Smoker     Stroke 2018    no deficits, baby ASA only, hx of blood thinners 2018    Uterine cancer     Visual impairment      Past Surgical History:   Procedure Laterality Date    ANUS SURGERY      blood clot    BRAIN SURGERY       shunt x3, and decompression    CARPAL TUNNEL RELEASE Left      SECTION      FOOT SURGERY Right     THYROID SURGERY       PT Assessment (Last 12 Hours)       PT Evaluation and Treatment       Row Name 25 09          Physical Therapy Time and Intention    Subjective Information no complaints (P)   -EB     Document Type evaluation (P)   -EB     Mode of Treatment individual therapy;physical therapy (P)   -EB     Patient Effort good (P)   -EB     Symptoms Noted During/After Treatment none (P)   -       Row Name 25 09          General Information    Patient Profile Reviewed yes (P)   -EB     Patient Observations alert;cooperative;agree to therapy (P)   -EB     Prior Level of Function independent:;all household mobility;community mobility (P)   -EB     Equipment Currently Used at Home cane, straight (P)   Pt reports she has a cane but has not been using like she should be  -EB     Existing Precautions/Restrictions fall (P)   -EB     Barriers to Rehab none identified (P)   -EB       Row Name 25 09          Living Environment    Current Living Arrangements apartment (P)   -EB     Home Accessibility stairs to enter home (P)   -EB     People in Home sibling(s) (P)   -       Row Name 25 09          Home Main Entrance    Number of Stairs, Main Entrance four (P)   -EB       Row Name 25          Range of Motion (ROM)    Range of Motion ROM is WFL;bilateral lower extremities (P)   -       Row Name 25 09          Strength (Manual Muscle Testing)    Strength (Manual Muscle Testing) bilateral lower extremities;strength is WFL (P)   -       Row Name 25 09          Bed Mobility    Bed Mobility supine-sit;sit-supine (P)   -EB     All Activities, [place] (Bed Mobility) standby assist (P)   -EB     Supine-Sit [place] (Bed Mobility) standby assist (P)   -EB     Sit-Supine [place] (Bed Mobility) standby assist (P)   -EB     Assistive Device (Bed  Mobility) bed rails (P)   -EB       Row Name 07/31/25 0900          Transfers    Transfers stand-sit transfer;sit-stand transfer (P)   -EB     Maintains Weight-bearing Status (Transfers) able to maintain (P)   -EB       Row Name 07/31/25 0900          Sit-Stand Transfer    Sit-Stand [place] (Transfers) contact guard (P)   -EB       Row Name 07/31/25 0900          Stand-Sit Transfer    Stand-Sit [place] (Transfers) contact guard (P)   -EB       Row Name 07/31/25 0900          Gait/Stairs (Locomotion)    Gait/Stairs Locomotion gait/ambulation independence (P)   -EB     [place] Level (Gait) contact guard (P)   -EB     Patient was able to Ambulate yes (P)   -EB     Distance in Feet (Gait) 150 (P)   -EB     Pattern (Gait) step-through (P)   -EB     Deviations/Abnormal Patterns (Gait) gait speed decreased;stride length decreased (P)   -EB     Bilateral Gait Deviations heel strike decreased;lateral trunk flexion (P)   -EB     Comment, (Gait/Stairs) Observed pt seeking wall for balance  during ambulation. (P)   -EB       Row Name 07/31/25 0900          Safety Issues/Impairments Affecting Functional Mobility    Safety Issues Affecting Function (Mobility) ability to follow commands (P)   -EB     Impairments Affecting Function (Mobility) balance;endurance/activity tolerance (P)   -EB       Row Name 07/31/25 0900          Balance    Balance Assessment standing dynamic balance (P)   -EB     Sit to Stand Dynamic Balance contact guard (P)   -EB     Static Standing Balance contact guard (P)   -EB     Dynamic Standing Balance contact guard (P)   -EB       Row Name 07/31/25 0900          Plan of Care Review    Plan of Care Reviewed With patient (P)   -EB     Outcome Evaluation Patient presents with decreased balance and endurance limiting safe ambulation. Skilled rehab services are required to address deficits. Recommend quad tip cane for ambulation. (P)   -EB       Row Name 07/31/25 0900          Positioning and  Restraints    Pre-Treatment Position in bed (P)   -EB     Post Treatment Position bed (P)   -EB       Row Name 07/31/25 0900          Therapy Assessment/Plan (PT)    Rehab Potential (PT) good (P)   -EB     Criteria for Skilled Interventions Met (PT) yes (P)   -EB     Therapy Frequency (PT) daily (P)   -EB     Predicted Duration of Therapy Intervention (PT) 10 (P)   -EB     Problem List (PT) problems related to;balance;mobility;strength (P)   -EB     Activity Limitations Related to Problem List (PT) unable to ambulate safely (P)   -EB       Row Name 07/31/25 0900          PT Evaluation Complexity    History, PT Evaluation Complexity no personal factors and/or comorbidities (P)   -EB     Examination of Body Systems (PT Eval Complexity) total of 4 or more elements (P)   -EB     Clinical Presentation (PT Evaluation Complexity) stable (P)   -EB     Clinical Decision Making (PT Evaluation Complexity) low complexity (P)   -EB     Overall Complexity (PT Evaluation Complexity) low complexity (P)   -EB       Row Name 07/31/25 0900          Therapy Plan Review/Discharge Plan (PT)    Therapy Plan Review (PT) patient (P)   -EB       Row Name 07/31/25 0900          Physical Therapy Goals    Gait Training Goal Selection (PT) gait training, PT goal 1 (P)   -EB     Balance Goal Selection (PT) balance, PT goal 1 (P)   -EB       Row Name 07/31/25 0900          Gait Training Goal 1 (PT)    Activity/Assistive Device (Gait Training Goal 1, PT) gait (walking locomotion);assistive device use (P)   -EB     Riley Level (Gait Training Goal 1, PT) modified independence (P)   -EB     Distance (Gait Training Goal 1, PT) 300 (P)   -EB     Time Frame (Gait Training Goal 1, PT) long term goal (LTG);10 days (P)   -EB       Row Name 07/31/25 0900          Balance Goal 1 (PT)    Activity/Assistive Device (Balance Goal) standing dynamic balance;unsupported (P)   -EB     [place]iley Level/Cues Needed (Balance Goal 1, PT) independent (P)   -EB      Time Frame (Balance Goal 1, PT) long-term goal (LTG);1 week (P)   -               User Key  (r) = Recorded By, (t) = Taken By, (c) = Cosigned By      Initials Name Provider Type    Farrah Carranza, PT Student PT Student                    Physical Therapy Education       Title: PT OT SLP Therapies (Done)       Topic: Physical Therapy (Done)       Point: Mobility training (Done)       Learning Progress Summary            Patient Acceptance, E,TB, VU by  at 7/31/2025 0947                                      User Key       Initials Effective Dates Name Provider Type Discipline     05/27/25 -  Farrah Fine, PT Student PT Student PT                  PT Recommendation and Plan  Anticipated Discharge Disposition (PT): (P) sub acute care setting  Planned Therapy Interventions (PT): (P) bed mobility training, balance training, gait training, home exercise program, ROM (range of motion), strengthening, transfer training  Therapy Frequency (PT): (P) daily  Plan of Care Reviewed With: (P) patient  Outcome Evaluation: (P) Patient presents with decreased balance and endurance limiting safe ambulation. Skilled rehab services are required to address deficits. Recommend quad tip cane for ambulation.   Outcome Measures       Row Name 07/31/25 0900             How much help from another person do you currently need...    Turning from your back to your side while in flat bed without using bedrails? 4 (P)   -EB      Moving from lying on back to sitting on the side of a flat bed without bedrails? 4 (P)   -EB      Moving to and from a bed to a chair (including a wheelchair)? 4 (P)   -EB      Standing up from a chair using your arms (e.g., wheelchair, bedside chair)? 4 (P)   -EB      Climbing 3-5 steps with a railing? 3 (P)   -EB      To walk in hospital room? 3 (P)   -EB      AM-PAC 6 Clicks Score (PT) 22 (P)   -EB         Functional Assessment    Outcome Measure Options AM-PAC 6 Clicks Basic Mobility (PT) (P)   -EB                 User Key  (r) = Recorded By, (t) = Taken By, (c) = Cosigned By      Initials Name Provider Type    Farrah Carranza, PT Student PT Student                     Time Calculation:    PT Charges       Row Name 07/31/25 0939             Time Calculation    PT Received On 07/31/25 (P)   -EB      PT Goal Re-Cert Due Date 08/09/25 (P)   -EB         Untimed Charges    PT Eval/Re-eval Minutes 20 (P)   -EB         Total Minutes    Untimed Charges Total Minutes 20 (P)   -EB       Total Minutes 20 (P)   -EB                User Key  (r) = Recorded By, (t) = Taken By, (c) = Cosigned By      Initials Name Provider Type    Farrah Carrnaza, PT Student PT Student                  Therapy Charges for Today       Code Description Service Date Service Provider Modifiers Qty    33296763233 HC PT EVAL LOW COMPLEXITY 3 7/31/2025 Farrah Fine, PT Student GP 1            PT G-Codes  Outcome Measure Options: (P) AM-PAC 6 Clicks Basic Mobility (PT)  AM-PAC 6 Clicks Score (PT): (P) 22  AM-PAC 6 Clicks Score (OT): 21    Farrah Fine PT Student  7/31/2025

## 2025-07-31 NOTE — THERAPY EVALUATION
RT EQUIPMENT DEVICE RELATED - SKIN ASSESSMENT    RT Medical Equipment/Device:     NIV Mask:  Under-the-nose   size:       Skin Assessment:      Head/neck/face:  Intact    Device Skin Pressure Protection:  Pressure points protected    Nurse Notification:  Riddhi Dawson, RRT

## 2025-07-31 NOTE — DISCHARGE SUMMARY
[facility]         HOSPITALIST  DISCHARGE SUMMARY    Patient Name: Ifrah Robertson  : 1960  MRN: [id number]    Date of Admission: 2025  Date of Discharge: 2025  Primary Care Physician: Rashid Acosta DO    Consults       Date and Time Order Name Status Description    2025  2:49 AM Inpatient Neurology Consult Stroke      2025 11:15 PM Hospitalist (on-call MD unless specified)              Active and Resolved Hospital Problems:  TIA versus complicated migraine  Left-sided hemianopsia, left lower extremity weakness  Diabetes A1c of 6.6  Hyperlipidemia  Class III obesity BMI 41  Hypertension  Obstructive sleep apnea  Budd-Chiari malformation    Active Hospital Problems    Diagnosis POA   • **CVA (cerebrovascular accident) [I63.9] Yes   • HTN (hypertension) [I10] Unknown   • Type 2 diabetes mellitus with diabetic neuropathy, with long-term current use of insulin [E11.40, Z79.4] Not Applicable   • Hypothyroidism [E03.9] Yes   • HAILYE (obstructive sleep apnea) [G47.33] Yes   • Nicotine addiction [F17.200] Yes      Resolved Hospital Problems   No resolved problems to display.       Hospital Course     Hospital Course:  Ifrah Robertson is a 64 y.o. female with past medical history of diabetes, hypertension, hyperlipidemia Budd-Chiari malformation  shunt, tobacco use, prior CVA in 2018, and GERD presented to the ED with left-sided vision loss and left upper extremity weakness. Patient states that around 7 PM while outside her home she suddenly lost peripheral vision on her left side and was somewhat unsteady on her feet. Patient does have history of prior stroke so due to concerns she was brought to the ED for further evaluation. In the ED patient's vitals were all within normal limits on arrival. Labs showed some leukocytosis but were relatively unremarkable given her chronic conditions including a negative UA. CT of the head showed showed old craniotomy and left basal  ganglia infarct which was not seen in 2019 but not acute. Perfusion study showed no core infarct but CTA of the head and neck did show possible emergent large vessel occlusion with high-grade stenosis of the right MCA bifurcation. EKG showed sinus rhythm with RBBB and old infarcts but no significant ST change. When seen patient said that her vision has slightly improved but still not at baseline and her left lower extremity weakness had resolved.  Interventional neurology contacted at [facility]. Images pushed over. No clear LVO appreciated. Low stroke scale. Symptoms don't clearly correlate.  Decision made to not transfer and patient admitted for further evaluation and treatment.  Neurology recommended continuing home aspirin and starting statin therapy.  MRI brain not be able to be obtained as documentation of the patient's shunt not able to be located.  No arrhythmias appreciated on telemetry.  Patient continued to improve.  Worked well with physical therapy occupational therapy.  Walking over 150 feet.  Patient wishing to return home with outpatient physical therapy occupational therapy.  Neurology recommending continue aspirin and statin therapy and following up with neurology in 1 to 3 weeks.  Patient seen and evaluated on date of discharge and thought stable for discharge home with outpatient therapy and follow-up with her primary care provider, neurology and optometry as an outpatient.        DISCHARGE Follow Up Recommendations for labs and diagnostics: As above      Day of Discharge     Vital Signs:  Temp:  [97.7 °F (36.5 °C)-98.6 °F (37 °C)] 97.7 °F (36.5 °C)  Heart Rate:  [70-77] 71  Resp:  [18] 18  BP: (109-121)/(60-67) 110/67  Physical Exam:   Gen. well-developed appearing stated age obese BMI 41 in no acute distress  HEENT: Normocephalic atraumatic moist membranes pupils equal round reactive light, no scleral icterus no conjunctival injection  Cardiovascular: regular rate and rhythm no murmurs  rubs or gallops S1-S2, no lower extremity edema appreciated  Pulmonary: Clear to auscultation bilaterally no wheezes rales or rhonchi symmetric chest expansion, unlabored, no conversational dyspnea appreciated  Gastrointestinal: Soft nontender nondistended positive bowel sounds all 4 quadrants no rebound or guarding  Musculoskeletal: No clubbing cyanosis, warm and well-perfused, calves soft symmetric nontender bilaterally  Skin: Clean dry without rashs  Neuro: Slight blurriness in the left lateral vision field of the left eye, no lateralizing sensorimotor deficits appreciated bilateral upper and lower extremities  Psych: Patient is calm cooperative and appropriate with exam not responding to internal stimuli  : No Branch catheter no bladder distention no suprapubic tenderness      Discharge Details        Discharge Medications        New Medications        Instructions Start Date   atorvastatin 80 MG tablet  Commonly known as: LIPITOR   80 mg, Oral, Nightly             Changes to Medications        Instructions Start Date   Aspirin Low Dose 81 MG EC tablet  Generic drug: aspirin  What changed: how much to take   TAKE ONE TABLET BY MOUTH ONCE DAILY      metFORMIN  MG 24 hr tablet  Commonly known as: GLUCOPHAGE-XR  What changed: See the new instructions.   TAKE 4 TABLETS EVERY MORNING             Continue These Medications        Instructions Start Date   albuterol sulfate  (90 Base) MCG/ACT inhaler  Commonly known as: PROVENTIL HFA;VENTOLIN HFA;PROAIR HFA   2 puffs, Inhalation, Every 4 Hours PRN      EPINEPHrine 0.3 MG/0.3ML solution auto-injector injection  Commonly known as: EPIPEN   0.3 mg, Once      escitalopram 20 MG tablet  Commonly known as: LEXAPRO   20 mg, Oral, Daily      fluticasone 50 MCG/ACT nasal spray  Commonly known as: Flonase   2 sprays, Nasal, Daily      levocetirizine 5 MG tablet  Commonly known as: XYZAL   5 mg, Every Evening      metoprolol succinate XL 50 MG 24 hr tablet  Commonly  known as: TOPROL-XL   50 mg, Oral, Daily      pantoprazole 40 MG EC tablet  Commonly known as: PROTONIX   40 mg, Oral, Daily      promethazine 25 MG tablet  Commonly known as: PHENERGAN   25 mg, Oral, Every 8 Hours PRN      Synthroid 100 MCG tablet  Generic drug: levothyroxine   100 mcg, Oral, Every Early Morning      Tirzepatide 7.5 MG/0.5ML solution auto-injector  Commonly known as: Mounjaro   0.5 mL, Subcutaneous, Weekly      vitamin D 1.25 MG (57798 UT) capsule capsule  Commonly known as: ERGOCALCIFEROL   50,000 Units, Oral, Every 7 Days               Allergies   Allergen Reactions   • Tetracycline Hcl Unknown - High Severity     INFECTED BLOOD   • Valacyclovir Hcl Hives   • Compazine [Prochlorperazine Edisylate] Hives and Itching   • Latex Rash     Rash    • Azithromycin Hives   • Cefaclor Unknown - Low Severity   • Gluten Meal Other (See Comments)   • Hydrocodone-Acetaminophen Unknown - Low Severity   • Indomethacin Unknown - Low Severity   • Ketorolac Tromethamine Unknown - Low Severity   • Statins Unknown - Low Severity   • Trazodone Dizziness       Discharge Disposition:  Home or Self Care    Diet:  Hospital:  Diet Order   Procedures   • Diet: Cardiac, Diabetic; Healthy Heart (2-3 Na+); Consistent Carbohydrate; Fluid Consistency: Thin (IDDSI 0)       Discharge Activity:   Activity Instructions       Gradually Increase Activity Until at Pre-Hospitalization Level              CODE STATUS:  Code Status and Medical Interventions: CPR (Attempt to Resuscitate); Full Support   Ordered at: 07/30/25 0010     Code Status (Patient has no pulse and is not breathing):    CPR (Attempt to Resuscitate)     Medical Interventions (Patient has pulse or is breathing):    Full Support     Level Of Support Discussed With:    Patient         No future appointments.    Additional Instructions for the Follow-ups that You Need to Schedule       Ambulatory Referral to Neurology   As directed      Hospital discharge follow up 1-3  weeks per teleneuro    Hospital discharge follow up 1-3 weeks per teleneuro    Order Comments: Hospital discharge follow up 1-3 weeks per teleneuro         Ambulatory Referral to Occupational Therapy   As directed      Specialty needed: Evaluate and treat   Follow-up needed: Yes        Ambulatory Referral to Physical Therapy   As directed      Specialty needed: Evaluate and treat   Follow-up needed: Yes        Discharge Follow-up with PCP   As directed       Currently Documented PCP:    Rashid Acosta DO    PCP Phone Number:    [phone]     Follow Up Details: Hospital discharge follow up 1-2 weeks        Discharge Follow-up with Specialty: Optometry   As directed      Specialty: Optometry   Follow Up Details: Patient established already with a Dr. Hood?                Pertinent  and/or Most Recent Results     PROCEDURES:   None    LAB RESULTS:      Lab 07/31/25  0605 07/29/25  2100   WBC 12.93* 13.46*   HEMOGLOBIN 13.6 15.5   HEMATOCRIT 42.8 47.4*   PLATELETS 258 315   NEUTROS ABS  --  9.70*   IMMATURE GRANS (ABS)  --  0.09*   LYMPHS ABS  --  2.57   MONOS ABS  --  0.71   EOS ABS  --  0.26   MCV 87.2 86.0   PROTIME  --  12.6   APTT  --  22.8*         Lab 07/31/25  0605 07/29/25  2100   SODIUM 138 138   POTASSIUM 4.5 4.3   CHLORIDE 102 100   CO2 25.8 22.9   ANION GAP 10.2 15.1*   BUN 6.5* 5.4*   CREATININE 0.84 0.69   EGFR 77.7 97.1   GLUCOSE 123* 148*   CALCIUM 8.9 8.2*   PHOSPHORUS 4.4  --    HEMOGLOBIN A1C  --  6.60*   TSH  --  10.010*         Lab 07/31/25  0605 07/29/25  2100   TOTAL PROTEIN  --  7.1   ALBUMIN 3.6 4.4   GLOBULIN  --  2.7   ALT (SGPT)  --  11   AST (SGOT)  --  11   BILIRUBIN  --  0.5   ALK PHOS  --  58         Lab 07/29/25  2100   PROTIME 12.6   INR 0.91         Lab 07/29/25  2100   CHOLESTEROL 298*   LDL CHOL 210*   HDL CHOL 38*   TRIGLYCERIDES 248*             Brief Urine Lab Results  (Last result in the past 365 days)        Color   Clarity   Blood   Leuk Est   Nitrite   Protein   CREAT    Urine HCG        07/30/25 0231 Yellow   Clear   Negative   Negative   Negative   Negative                 Microbiology Results (last 10 days)       ** No results found for the last 240 hours. **            CT Angiogram Head w AI Analysis of LVO  Result Date: 7/29/2025  Impression: (COMBINED IMPRESSION) 1. The study is ABNORMAL. 2. There is a possible emergent large vessel occlusion versus high-grade stenosis at the right MCA bifurcation involving the origins of the superior and inferior division of the M2 segments of the right middle cerebral artery (MCA) territory. CT/CTA suggests the possibility of an acute-to-subacute infarct within the right MCA distribution, including portions of the anterior right basal ganglia. 3. Mild-to-moderate narrowing involves the distal cavernous and supraclinoid right internal carotid artery (ICA). 4. There is mild-to-moderate luminal narrowing of the proximal left internal carotid artery, which is estimated at 49% by NASCET criteria; this finding is predominantly due to noncalcified atherosclerotic plaque. 5. Otherwise, no definite hemodynamically significant arterial stenoses are seen. 6. The left vertebral artery is dominant. 7. No cerebral aneurysm. 8. Please see above comments for further detail. CRITICAL RESULT: The critical finding(s), as under number 2 of the impression, was (were) discussed with Dr. Tee (ordering/attending [facility] ED Physician) by phone at approximately 10:33 p.m. on 7/29/2025. Portions of this note were completed with a voice recognition program. Electronically Signed: Balaji Wang MD  7/29/2025 10:49 PM EDT  Workstation ID: [id number]    CT Angiogram Neck  Result Date: 7/29/2025  Impression: (COMBINED IMPRESSION) 1. The study is ABNORMAL. 2. There is a possible emergent large vessel occlusion versus high-grade stenosis at the right MCA bifurcation involving the origins of the superior and inferior division of the M2 segments of the right middle cerebral  artery (MCA) territory. CT/CTA suggests the possibility of an acute-to-subacute infarct within the right MCA distribution, including portions of the anterior right basal ganglia. 3. Mild-to-moderate narrowing involves the distal cavernous and supraclinoid right internal carotid artery (ICA). 4. There is mild-to-moderate luminal narrowing of the proximal left internal carotid artery, which is estimated at 49% by NASCET criteria; this finding is predominantly due to noncalcified atherosclerotic plaque. 5. Otherwise, no definite hemodynamically significant arterial stenoses are seen. 6. The left vertebral artery is dominant. 7. No cerebral aneurysm. 8. Please see above comments for further detail. CRITICAL RESULT: The critical finding(s), as under number 2 of the impression, was (were) discussed with Dr. Tee (ordering/attending [facility] ED Physician) by phone at approximately 10:33 p.m. on 7/29/2025. Portions of this note were completed with a voice recognition program. Electronically Signed: Balaji Wang MD  7/29/2025 10:49 PM EDT  Workstation ID: [id number]    XR Chest 1 View  Result Date: 7/29/2025  Impression: Impression: No evidence of active chest disease. Electronically Signed: Clifford Gallo MD  7/29/2025 10:04 PM EDT  Workstation ID: [id number]    CT CEREBRAL PERFUSION WITH & WITHOUT CONTRAST  Result Date: 7/29/2025  Impression: No core infarct is suggested using established thresholds. The cerebral hypoperfusion portion of the study is thought to be nondiagnostic due to motion artifact. CBF < 30% (core infarct volume or vol.): 0 mL. Tmax > 6 sec (hypoperfusion vol.): 63 mL. Mismatch difference (or mismatch vol. or penumbra): 63 mL. Mismatch ratio (hypoperfusion/core infarct): Undefined. Portions of this note were completed with a voice recognition program. Electronically Signed: Balaji Wang MD  7/29/2025 9:51 PM EDT  Workstation ID: [id number]    CT Head Without Contrast Stroke Protocol  Result Date:  7/29/2025  Impression: Impression: 1. Left suboccipital craniectomy as on prior study. Right parietal approach ventricular shunt catheter, in stable position since 2018. Small diameter lateral ventricles unchanged. 2. 1 cm right basal ganglia infarct, new since 2018 but old by CT criteria. 3. Increased prominence of the frontal CSF spaces since 2018 which may represent expansion of the CSF due to interval brain atrophy. Differential would include small subdural hygromas, or even old subdural hemorrhages. No evidence, however, of acute or recent blood products here or elsewhere. No evidence of acute intracranial disease. Electronically Signed: Clifford Gallo MD  7/29/2025 9:16 PM EDT  Workstation ID: [id number]              Results for orders placed during the hospital encounter of 07/29/25    Adult Transthoracic Echo Complete W/ Cont if Necessary Per Protocol 07/30/2025 12:39 PM    Interpretation Summary  •  Left ventricular ejection fraction appears to be 56 - 60%.  •  Left ventricular wall thickness is consistent with borderline concentric hypertrophy.  •  Left ventricular diastolic function is consistent with (grade I) impaired relaxation and age.  •  No significant valvular disease.      Labs Pending at Discharge:        Time spent on Discharge including face to face service: 38 minutes    Electronically signed by Ananth Alvarez MD, 07/31/25, 3:34 PM EDT.

## 2025-07-31 NOTE — OUTREACH NOTE
Prep Survey      Flowsheet Row Responses   [facility] patient discharged from? [facility]   Is LACE score < 7 ? No   Eligibility [facility]   Date of Admission 07/29/25   Date of Discharge 07/31/25   Discharge Disposition Home or Self Care   Discharge diagnosis CVA (cerebrovascular accident)   Does the patient have one of the following disease processes/diagnoses(primary or secondary)? Stroke   Does the patient have Home health ordered? No   Is there a DME ordered? No   Prep survey completed? Yes            NATA BERRY - Registered Nurse

## 2025-07-31 NOTE — THERAPY EVALUATION
RT EQUIPMENT DEVICE RELATED - SKIN ASSESSMENT    RT Medical Equipment/Device:     NIV Mask:  Full-face    size:      Skin Assessment:      Cheek:  Intact  Nose:  Intact  Mouth:  Intact    Device Skin Pressure Protection:  Positioning supports utilized    Nurse Notification:  Riddhi Doty, RRT

## 2025-07-31 NOTE — PROGRESS NOTES
[facility]  [facility] TeleNeurology Consult Services    Routine Consult Follow-Up    Patient Name:   Ifrah Robertson  YOB: 1960  Identification Number:   MRN - [id number]  Date of Service:   07/31/2025 15:23:09    Diagnosis        G45.9 - Transient cerebral ischemic attack, unspecified    Impression  Patient is a 64-year-old right-handed female with prior history of stroke, diabetes, Arnold Chiari malformation, uterine cancer in remission, tobacco use. Patient unable to have MRI brain imaging due to shunt placement. Patient today states she has history of retinal detachment and would like to follow-up with her ophthalmologist as well. Currently asymptomatic. Patient eager for discharge. At this time possible TIA, possible complicated migraine. Recommend to continue aspirin, atorvastatin. Patient to follow-up with outpatient neurology. Please call as needed.    Our recommendations are outlined below    DVT Prophylaxis :  Choice of Primary Team    Disposition :  No further recommendationsOutpatient Neurology follow up in 1-3 weeks    Subjective  Nursing staff reports patient unable to have MRI brain imaging due to shunt. Patient states she feels better. No longer has vision concerns. She feels that she needs to see her eye doctor because she has a history of retinal detachment. No complaints of weakness or numbness.    Hospital Course  Patient is a 64-year-old right-handed female with prior history of stroke, diabetes, Arnold Chiari malformation, uterine cancer in remission, tobacco use presenting with left leg weakness, left eye blurred vision, dizziness described as imbalance. On initial presentation Case was discussed with HARESH at [facility] who did not feel that there is clear indication for transfer or acute intervention. Patient not felt to be a thrombolytic candidate on admission. During admission did have some symptomatic improvement.  7/30/25: Family at bedside. Patient states she  is tired. States vision has improved. States left eye no longer looks black, currently feels blurry. No longer has weakness. Left leg has improved. Dizziness has improved.    Imaging  Echo:  Interpretation Summary       Left ventricular ejection fraction appears to be 56 - 60%.   Left ventricular wall thickness is consistent with borderline concentric hypertrophy.   Left ventricular diastolic function is consistent with (grade I) impaired relaxation and age.   No significant valvular disease.    CTA head and neck: COMBINED IMPRESSION)    1. The study is ABNORMAL.    2. There is a possible emergent large vessel occlusion versus high-grade stenosis at the right MCA bifurcation involving the origins of the superior and inferior division of the M2 segments of the right middle cerebral artery (MCA) territory. CT/CTA  suggests the possibility of an acute-to-subacute infarct within the right MCA distribution, including portions of the anterior right basal ganglia.    3. Mild-to-moderate narrowing involves the distal cavernous and supraclinoid right internal carotid artery (ICA).    4. There is mild-to-moderate luminal narrowing of the proximal left internal carotid artery, which is estimated at 49% by NASCET criteria; this finding is predominantly due to noncalcified atherosclerotic plaque. ..  5. Left vertebral artery is dominant.  6. No cerebral aneurysm.    Labs  TSH 7.010, hemoglobin A1c 6.6, free T40.9 , sodium 138, potassium 4.5, GFR 77.7, glucose 150,, WBC 12.93, platelets 258      Examination  BP(113/62), Pulse(76), Temp(98.2), Resp(18),  1A: Level of Consciousness - Alert; keenly responsive + 0  1B: Ask Month and Age - Both Questions Right + 0  1C: Blink Eyes & Squeeze Hands - Performs Both Tasks + 0  2: Test Horizontal Extraocular Movements - Normal + 0  3: Test Visual Fields - No Visual Loss + 0  4: Test Facial Palsy (Use Grimace if Obtunded) - Normal symmetry + 0  5A: Test Left Arm Motor Drift - No Drift  for 10 Seconds + 0  5B: Test Right Arm Motor Drift - No Drift for 10 Seconds + 0  6A: Test Left Leg Motor Drift - No Drift for 5 Seconds + 0  6B: Test Right Leg Motor Drift - No Drift for 5 Seconds + 0  7: Test Limb Ataxia (FNF/Heel-Shin) - No Ataxia + 0  8: Test Sensation - Normal; No sensory loss + 0  9: Test Language/Aphasia - Normal; No aphasia + 0  10: Test Dysarthria - Normal + 0  11: Test Extinction/Inattention - No abnormality + 0    NIHSS Score: 0          This consult was conducted in real time using interactive audio and video technology. Patient was informed of the technology being used for this visit and agreed to proceed. Patient located in hospital and provider located at home/office setting.    Telehealth Neurology consultation was provided. I spent minutes providing telehealth care. This includes time spent for face to face visit via telemedicine, review of medical records, imaging studies and discussion of findings with providers, the patient and/or family.      Dr Jazmín Jones      [facility]  For Inpatient follow-up with [facility] physician please call [facility] at [phone]. As we are not an outpatient service for any post hospital discharge needs please contact the hospital for assistance.  If you have any questions for the [facility] physicians or need to reconsult for clinical or diagnostic changes please contact us via [facility] at [phone]          Signature : Jazmín Jones

## 2025-07-31 NOTE — PLAN OF CARE
Goal Outcome Evaluation:  Plan of Care Reviewed With: patient, family        Progress: improving  Outcome Evaluation: No acute changes this shift. Pt to discharge home via family. IV and telemetry removed.

## 2025-07-31 NOTE — PLAN OF CARE
Goal Outcome Evaluation:         Placed patient on cpap around 930pm. When I came around at 1130 she was no longer on cpap. Currently sleeping.

## 2025-07-31 NOTE — SIGNIFICANT NOTE
07/31/25 1541   Plan   Plan Pt has Appt on 8/12/25 @ 9:00 am Pt will need to bring Ins and ID card.   Final Discharge Disposition Code 01 - home or self-care   Final Note Pt will discharge home today with OP therapy, AMANDA Gutierrez.

## 2025-07-31 NOTE — PLAN OF CARE
Goal Outcome Evaluation:      Patient off bipap this morning. She is on room air with a saturation in the mid 90s.

## 2025-08-01 ENCOUNTER — TRANSITIONAL CARE MANAGEMENT TELEPHONE ENCOUNTER (OUTPATIENT)
Dept: CALL CENTER | Facility: HOSPITAL | Age: 65
End: 2025-08-01
Payer: MEDICARE

## 2025-08-01 NOTE — OUTREACH NOTE
Call Center TCM Note      Flowsheet Row Responses   [facility] patient discharged from? [facility]   Does the patient have one of the following disease processes/diagnoses(primary or secondary)? Stroke   TCM attempt successful? No  [verbal release for Pat Crowder]   Unsuccessful attempts Attempt 1  [attempted pt and sister]   Call Status Left message            SATISH Arguello Registered Nurse    8/1/2025, 09:40 EDT

## 2025-08-04 ENCOUNTER — TRANSITIONAL CARE MANAGEMENT TELEPHONE ENCOUNTER (OUTPATIENT)
Dept: CALL CENTER | Facility: HOSPITAL | Age: 65
End: 2025-08-04
Payer: MEDICARE

## 2025-08-28 ENCOUNTER — OFFICE VISIT (OUTPATIENT)
Dept: FAMILY MEDICINE CLINIC | Facility: CLINIC | Age: 65
End: 2025-08-28
Payer: MEDICARE

## 2025-08-28 VITALS
WEIGHT: 279 LBS | DIASTOLIC BLOOD PRESSURE: 71 MMHG | SYSTOLIC BLOOD PRESSURE: 135 MMHG | BODY MASS INDEX: 42.28 KG/M2 | RESPIRATION RATE: 16 BRPM | OXYGEN SATURATION: 99 % | HEIGHT: 68 IN | TEMPERATURE: 97.8 F | HEART RATE: 70 BPM

## 2025-08-28 DIAGNOSIS — E78.00 HIGH CHOLESTEROL: Chronic | ICD-10-CM

## 2025-08-28 DIAGNOSIS — E11.65 TYPE 2 DIABETES MELLITUS WITH HYPERGLYCEMIA, WITH LONG-TERM CURRENT USE OF INSULIN: Chronic | ICD-10-CM

## 2025-08-28 DIAGNOSIS — I15.2 HYPERTENSION DUE TO ENDOCRINE DISORDER: Chronic | ICD-10-CM

## 2025-08-28 DIAGNOSIS — E03.9 HYPOTHYROIDISM, UNSPECIFIED TYPE: Chronic | ICD-10-CM

## 2025-08-28 DIAGNOSIS — I63.9 CEREBROVASCULAR ACCIDENT (CVA), UNSPECIFIED MECHANISM: Primary | ICD-10-CM

## 2025-08-28 DIAGNOSIS — Z79.4 TYPE 2 DIABETES MELLITUS WITH HYPERGLYCEMIA, WITH LONG-TERM CURRENT USE OF INSULIN: Chronic | ICD-10-CM

## 2025-08-28 DIAGNOSIS — R29.898 WEAKNESS OF BOTH LEGS: Chronic | ICD-10-CM
